# Patient Record
Sex: FEMALE | URBAN - METROPOLITAN AREA
[De-identification: names, ages, dates, MRNs, and addresses within clinical notes are randomized per-mention and may not be internally consistent; named-entity substitution may affect disease eponyms.]

---

## 2019-01-02 ENCOUNTER — OFFICE (OUTPATIENT)
Dept: URBAN - METROPOLITAN AREA CLINIC 66 | Facility: CLINIC | Age: 64
End: 2019-01-02

## 2019-01-02 VITALS
HEIGHT: 68 IN | WEIGHT: 172 LBS | DIASTOLIC BLOOD PRESSURE: 74 MMHG | HEART RATE: 72 BPM | SYSTOLIC BLOOD PRESSURE: 110 MMHG

## 2019-01-02 DIAGNOSIS — Z86.010 PERSONAL HISTORY OF COLONIC POLYPS: ICD-10-CM

## 2019-01-02 DIAGNOSIS — I81 PORTAL VEIN THROMBOSIS: ICD-10-CM

## 2019-01-02 DIAGNOSIS — I85.00 ESOPHAGEAL VARICES WITHOUT BLEEDING: ICD-10-CM

## 2019-01-02 DIAGNOSIS — R93.3 ABNORMAL FINDINGS ON DIAGNOSTIC IMAGING OF OTHER PARTS OF DI: ICD-10-CM

## 2019-01-02 PROCEDURE — 99243 OFF/OP CNSLTJ NEW/EST LOW 30: CPT | Performed by: INTERNAL MEDICINE

## 2019-06-25 ENCOUNTER — OFFICE (OUTPATIENT)
Dept: URBAN - METROPOLITAN AREA CLINIC 66 | Facility: CLINIC | Age: 64
End: 2019-06-25

## 2019-06-25 VITALS
HEART RATE: 74 BPM | HEIGHT: 68 IN | SYSTOLIC BLOOD PRESSURE: 118 MMHG | DIASTOLIC BLOOD PRESSURE: 72 MMHG | WEIGHT: 172 LBS

## 2019-06-25 DIAGNOSIS — I81 PORTAL VEIN THROMBOSIS: ICD-10-CM

## 2019-06-25 DIAGNOSIS — I85.00 ESOPHAGEAL VARICES WITHOUT BLEEDING: ICD-10-CM

## 2019-06-25 DIAGNOSIS — R93.3 ABNORMAL FINDINGS ON DIAGNOSTIC IMAGING OF OTHER PARTS OF DI: ICD-10-CM

## 2019-06-25 PROCEDURE — 99213 OFFICE O/P EST LOW 20 MIN: CPT | Performed by: INTERNAL MEDICINE

## 2019-11-25 ENCOUNTER — AMBULATORY SURGICAL CENTER (OUTPATIENT)
Dept: URBAN - METROPOLITAN AREA SURGERY 20 | Facility: SURGERY | Age: 64
End: 2019-11-25

## 2019-11-25 ENCOUNTER — OFFICE (OUTPATIENT)
Dept: URBAN - METROPOLITAN AREA PATHOLOGY 4 | Facility: PATHOLOGY | Age: 64
End: 2019-11-25

## 2019-11-25 VITALS — HEIGHT: 68 IN

## 2019-11-25 DIAGNOSIS — K29.70 GASTRITIS, UNSPECIFIED, WITHOUT BLEEDING: ICD-10-CM

## 2019-11-25 DIAGNOSIS — Z86.010 PERSONAL HISTORY OF COLONIC POLYPS: ICD-10-CM

## 2019-11-25 DIAGNOSIS — K31.89 OTHER DISEASES OF STOMACH AND DUODENUM: ICD-10-CM

## 2019-11-25 DIAGNOSIS — I85.00 ESOPHAGEAL VARICES WITHOUT BLEEDING: ICD-10-CM

## 2019-11-25 LAB
GI HISTOLOGY: A. SELECT: (no result)
GI HISTOLOGY: PDF REPORT: (no result)

## 2019-11-25 PROCEDURE — 43239 EGD BIOPSY SINGLE/MULTIPLE: CPT | Performed by: INTERNAL MEDICINE

## 2019-11-25 PROCEDURE — 88305 TISSUE EXAM BY PATHOLOGIST: CPT | Performed by: INTERNAL MEDICINE

## 2019-11-25 PROCEDURE — 45378 DIAGNOSTIC COLONOSCOPY: CPT | Mod: 33 | Performed by: INTERNAL MEDICINE

## 2020-02-11 ENCOUNTER — OFFICE (OUTPATIENT)
Dept: URBAN - METROPOLITAN AREA CLINIC 66 | Facility: CLINIC | Age: 65
End: 2020-02-11

## 2020-02-11 VITALS
HEIGHT: 68 IN | WEIGHT: 170 LBS | DIASTOLIC BLOOD PRESSURE: 68 MMHG | HEART RATE: 60 BPM | SYSTOLIC BLOOD PRESSURE: 110 MMHG

## 2020-02-11 DIAGNOSIS — I81 PORTAL VEIN THROMBOSIS: ICD-10-CM

## 2020-02-11 DIAGNOSIS — Z86.010 PERSONAL HISTORY OF COLONIC POLYPS: ICD-10-CM

## 2020-02-11 DIAGNOSIS — I85.00 ESOPHAGEAL VARICES WITHOUT BLEEDING: ICD-10-CM

## 2020-02-11 DIAGNOSIS — Z90.49 ACQUIRED ABSENCE OF OTHER SPECIFIED PARTS OF DIGESTIVE TRACT: ICD-10-CM

## 2020-02-11 PROCEDURE — 99214 OFFICE O/P EST MOD 30 MIN: CPT | Performed by: INTERNAL MEDICINE

## 2020-09-30 ENCOUNTER — TRANSCRIBE ORDERS (OUTPATIENT)
Dept: PREADMISSION TESTING | Facility: HOSPITAL | Age: 65
End: 2020-09-30

## 2020-09-30 DIAGNOSIS — Z01.818 OTHER SPECIFIED PRE-OPERATIVE EXAMINATION: Primary | ICD-10-CM

## 2020-10-05 ENCOUNTER — APPOINTMENT (OUTPATIENT)
Dept: PREADMISSION TESTING | Facility: HOSPITAL | Age: 65
End: 2020-10-05

## 2020-10-05 VITALS
OXYGEN SATURATION: 98 % | BODY MASS INDEX: 26.07 KG/M2 | HEART RATE: 62 BPM | HEIGHT: 68 IN | DIASTOLIC BLOOD PRESSURE: 69 MMHG | RESPIRATION RATE: 16 BRPM | SYSTOLIC BLOOD PRESSURE: 118 MMHG | WEIGHT: 172 LBS | TEMPERATURE: 97.2 F

## 2020-10-05 LAB
ANION GAP SERPL CALCULATED.3IONS-SCNC: 9.6 MMOL/L (ref 5–15)
BUN SERPL-MCNC: 18 MG/DL (ref 8–23)
BUN/CREAT SERPL: 20.5 (ref 7–25)
CALCIUM SPEC-SCNC: 9.2 MG/DL (ref 8.6–10.5)
CHLORIDE SERPL-SCNC: 108 MMOL/L (ref 98–107)
CO2 SERPL-SCNC: 23.4 MMOL/L (ref 22–29)
CREAT SERPL-MCNC: 0.88 MG/DL (ref 0.57–1)
DEPRECATED RDW RBC AUTO: 46.7 FL (ref 37–54)
ERYTHROCYTE [DISTWIDTH] IN BLOOD BY AUTOMATED COUNT: 14.8 % (ref 12.3–15.4)
GFR SERPL CREATININE-BSD FRML MDRD: 65 ML/MIN/1.73
GLUCOSE SERPL-MCNC: 99 MG/DL (ref 65–99)
HCT VFR BLD AUTO: 40.5 % (ref 34–46.6)
HGB BLD-MCNC: 13.5 G/DL (ref 12–15.9)
MCH RBC QN AUTO: 29.3 PG (ref 26.6–33)
MCHC RBC AUTO-ENTMCNC: 33.3 G/DL (ref 31.5–35.7)
MCV RBC AUTO: 88 FL (ref 79–97)
PLATELET # BLD AUTO: 378 10*3/MM3 (ref 140–450)
PMV BLD AUTO: 10.5 FL (ref 6–12)
POTASSIUM SERPL-SCNC: 4.7 MMOL/L (ref 3.5–5.2)
RBC # BLD AUTO: 4.6 10*6/MM3 (ref 3.77–5.28)
SODIUM SERPL-SCNC: 141 MMOL/L (ref 136–145)
WBC # BLD AUTO: 7.47 10*3/MM3 (ref 3.4–10.8)

## 2020-10-05 PROCEDURE — 85027 COMPLETE CBC AUTOMATED: CPT | Performed by: UROLOGY

## 2020-10-05 PROCEDURE — 93005 ELECTROCARDIOGRAM TRACING: CPT

## 2020-10-05 PROCEDURE — 93010 ELECTROCARDIOGRAM REPORT: CPT | Performed by: INTERNAL MEDICINE

## 2020-10-05 PROCEDURE — 80048 BASIC METABOLIC PNL TOTAL CA: CPT | Performed by: UROLOGY

## 2020-10-05 PROCEDURE — 36415 COLL VENOUS BLD VENIPUNCTURE: CPT

## 2020-10-05 RX ORDER — WARFARIN SODIUM 4 MG/1
4 TABLET ORAL
COMMUNITY
End: 2023-01-23

## 2020-10-05 RX ORDER — CETIRIZINE HYDROCHLORIDE 10 MG/1
10 TABLET ORAL NIGHTLY PRN
COMMUNITY
End: 2023-01-23

## 2020-10-05 RX ORDER — ACETAMINOPHEN 500 MG
500 TABLET ORAL EVERY 6 HOURS PRN
COMMUNITY
End: 2023-01-23

## 2020-10-05 RX ORDER — NADOLOL 20 MG/1
30 TABLET ORAL EVERY MORNING
COMMUNITY

## 2020-10-05 NOTE — DISCHARGE INSTRUCTIONS
Take the following medications the morning of surgery with a small sip of water:    NADOLOL    ARRIVE AT 0900.      If you are on prescription narcotic pain medication to control your pain you may also take that medication the morning of surgery.    General Instructions:  • Do not eat or drink anything after midnight the night before surgery.  • Infants may have breast milk up to four hours before surgery.  • Infants drinking formula may drink formula up to six hours before surgery.   • Patients who avoid smoking, chewing tobacco and alcohol for 4 weeks prior to surgery have a reduced risk of post-operative complications.  Quit smoking as many days before surgery as you can.  • Do not smoke, use chewing tobacco or drink alcohol the day of surgery.   • If applicable bring your C-PAP/ BI-PAP machine.  • Bring any papers given to you in the doctor’s office.  • Wear clean comfortable clothes.  • Do not wear contact lenses, false eyelashes or make-up.  Bring a case for your glasses.   • Bring crutches or walker if applicable.  • Remove all piercings.  Leave jewelry and any other valuables at home.  • Hair extensions with metal clips must be removed prior to surgery.  • The Pre-Admission Testing nurse will instruct you to bring medications if unable to obtain an accurate list in Pre-Admission Testing.        If you were given a blood bank ID arm band remember to bring it with you the day of surgery.    Preventing a Surgical Site Infection:  • For 2 to 3 days before surgery, avoid shaving with a razor because the razor can irritate skin and make it easier to develop an infection.    • Any areas of open skin can increase the risk of a post-operative wound infection by allowing bacteria to enter and travel throughout the body.  Notify your surgeon if you have any skin wounds / rashes even if it is not near the expected surgical site.  The area will need assessed to determine if surgery should be delayed until it is  healed.  • The night prior to surgery shower using a fresh bar of anti-bacterial soap (such as Dial) and clean washcloth.  Sleep in a clean bed with clean clothing.  Do not allow pets to sleep with you.  • Shower on the morning of surgery using a fresh bar of anti-bacterial soap (such as Dial) and clean washcloth.  Dry with a clean towel and dress in clean clothing.  • Ask your surgeon if you will be receiving antibiotics prior to surgery.  • Make sure you, your family, and all healthcare providers clean their hands with soap and water or an alcohol based hand  before caring for you or your wound.    Day of surgery:  Your arrival time is approximately two hours before your scheduled surgery time.  Upon arrival, a Pre-op nurse and Anesthesiologist will review your health history, obtain vital signs, and answer questions you may have.  The only belongings needed at this time will be your home medications and if applicable your C-PAP/BI-PAP machine.  If you are staying overnight your family can leave the rest of your belongings in the car and bring them to your room later.  A Pre-op nurse will start an IV and you may receive medication in preparation for surgery, including something to help you relax.  Your family will be able to see you in the Pre-op area.  Two visitors at a time will be allowed in the Pre-op room.  While you are in surgery your family should notify the waiting room  if they leave the waiting room area and provide a contact phone number.    Please be aware that surgery does come with discomfort.  We want to make every effort to control your discomfort so please discuss any uncontrolled symptoms with your nurse.   Your doctor will most likely have prescribed pain medications.      If you are going home after surgery you will receive individualized written care instructions before being discharged.  A responsible adult must drive you to and from the hospital on the day of your  surgery and stay with you for 24 hours.    If you are staying overnight following surgery, you will be transported to your hospital room following the recovery period.  [facility] has all private rooms.    If you have any questions please call Pre-Admission Testing at [phone].  Deductibles and co-payments are collected on the day of service. Please be prepared to pay the required co-pay, deductible or deposit on the day of service as defined by your plan.    Patient Education for Self-Quarantine Process    Following your COVID testing, we strongly recommend that you do not leave your home after you have been tested for COVID except to get medical care. This includes not going to work, school or to public areas.  If this is not possible for you to do please limit your activities to only required outings.  Be sure to wear a mask when you are with other people, practice social distancing and wash your hands frequently.      The following items provide additional details to keep you safe.  • Wash your hands with soap and water frequently for at least 20 seconds.   • Avoid touching your eyes, nose and mouth with unwashed hands.  • Do not share anything - utensils, towels, food from the same bowl.   • Have your own utensils, drinking glass, dishes, towels and bedding.   • Do not have visitors.   • Do use FaceTime to stay in touch with family and friends.  • You should stay in a specific room away from others if possible.   • Stay at least 6 feet away from others in the home if you cannot have a dedicated room to yourself.   • Do not snuggle with your pet. While the CDC says there is no evidence that pets can spread COVID-19 or be infected from humans, it is probably best to avoid “petting, snuggling, being kissed or licked and sharing food (during self-quarantine)”, according to the CDC.   • Sanitize household surfaces daily. Include all high touch areas (door handles, light switches, phones,  countertops, etc.)  • Do not share a bathroom with others, if possible.   • Wear a mask around others in your home if you are unable to stay in a separate room or 6 feet apart. If  you are unable to wear a mask, have your family member wear a mask if they must be within 6 feet of you.   Call your surgeon immediately if you experience any of the following symptoms:  • Sore Throat  • Shortness of Breath or difficulty breathing  • Cough  • Chills  • Body soreness or muscle pain  • Headache  • Fever  • New loss of taste or smell  • Do not arrive for your surgery ill.  Your procedure will need to be rescheduled to another time.  You will need to call your physician before the day of surgery to avoid any unnecessary exposure to hospital staff as well as other patients.

## 2020-10-12 ENCOUNTER — LAB (OUTPATIENT)
Dept: LAB | Facility: HOSPITAL | Age: 65
End: 2020-10-12

## 2020-10-12 DIAGNOSIS — Z01.818 OTHER SPECIFIED PRE-OPERATIVE EXAMINATION: ICD-10-CM

## 2020-10-12 PROCEDURE — C9803 HOPD COVID-19 SPEC COLLECT: HCPCS

## 2020-10-12 PROCEDURE — U0004 COV-19 TEST NON-CDC HGH THRU: HCPCS

## 2020-10-13 LAB — SARS-COV-2 RNA RESP QL NAA+PROBE: NOT DETECTED

## 2020-10-14 ENCOUNTER — ANESTHESIA (OUTPATIENT)
Dept: PERIOP | Facility: HOSPITAL | Age: 65
End: 2020-10-14

## 2020-10-14 ENCOUNTER — HOSPITAL ENCOUNTER (OUTPATIENT)
Facility: HOSPITAL | Age: 65
Setting detail: HOSPITAL OUTPATIENT SURGERY
Discharge: HOME | End: 2020-10-14
Attending: UROLOGY | Admitting: UROLOGY

## 2020-10-14 ENCOUNTER — ANESTHESIA EVENT (OUTPATIENT)
Dept: PERIOP | Facility: HOSPITAL | Age: 65
End: 2020-10-14

## 2020-10-14 ENCOUNTER — APPOINTMENT (OUTPATIENT)
Dept: GENERAL RADIOLOGY | Facility: HOSPITAL | Age: 65
End: 2020-10-14

## 2020-10-14 VITALS
HEIGHT: 68 IN | TEMPERATURE: 97.6 F | BODY MASS INDEX: 25.66 KG/M2 | DIASTOLIC BLOOD PRESSURE: 67 MMHG | RESPIRATION RATE: 16 BRPM | OXYGEN SATURATION: 98 % | HEART RATE: 59 BPM | WEIGHT: 169.3 LBS | SYSTOLIC BLOOD PRESSURE: 112 MMHG

## 2020-10-14 DIAGNOSIS — Q62.2: Primary | ICD-10-CM

## 2020-10-14 LAB
INR PPP: 1.44 (ref 0.9–1.1)
PROTHROMBIN TIME: 17.3 SECONDS (ref 11.7–14.2)

## 2020-10-14 PROCEDURE — 74420 UROGRAPHY RTRGR +-KUB: CPT

## 2020-10-14 PROCEDURE — C1758 CATHETER, URETERAL: HCPCS | Performed by: UROLOGY

## 2020-10-14 PROCEDURE — 85610 PROTHROMBIN TIME: CPT | Performed by: UROLOGY

## 2020-10-14 PROCEDURE — 25010000003 CEFAZOLIN IN DEXTROSE 2-4 GM/100ML-% SOLUTION: Performed by: UROLOGY

## 2020-10-14 PROCEDURE — 0 IOTHALAMATE 60 % SOLUTION: Performed by: UROLOGY

## 2020-10-14 PROCEDURE — 25010000002 PROPOFOL 10 MG/ML EMULSION: Performed by: NURSE ANESTHETIST, CERTIFIED REGISTERED

## 2020-10-14 RX ORDER — PROMETHAZINE HYDROCHLORIDE 25 MG/1
25 TABLET ORAL ONCE AS NEEDED
Status: DISCONTINUED | OUTPATIENT
Start: 2020-10-14 | End: 2020-10-14 | Stop reason: HOSPADM

## 2020-10-14 RX ORDER — FOLIC ACID 0.8 MG
1 TABLET ORAL DAILY
COMMUNITY
End: 2021-01-13

## 2020-10-14 RX ORDER — HYDRALAZINE HYDROCHLORIDE 20 MG/ML
5 INJECTION INTRAMUSCULAR; INTRAVENOUS
Status: DISCONTINUED | OUTPATIENT
Start: 2020-10-14 | End: 2020-10-14 | Stop reason: HOSPADM

## 2020-10-14 RX ORDER — ATROPA BELLADONNA AND OPIUM 16.2; 3 MG/1; MG/1
30 SUPPOSITORY RECTAL ONCE
Status: DISCONTINUED | OUTPATIENT
Start: 2020-10-14 | End: 2020-10-14 | Stop reason: HOSPADM

## 2020-10-14 RX ORDER — NALOXONE HCL 0.4 MG/ML
0.2 VIAL (ML) INJECTION AS NEEDED
Status: DISCONTINUED | OUTPATIENT
Start: 2020-10-14 | End: 2020-10-14 | Stop reason: HOSPADM

## 2020-10-14 RX ORDER — OXYCODONE AND ACETAMINOPHEN 7.5; 325 MG/1; MG/1
1 TABLET ORAL ONCE AS NEEDED
Status: DISCONTINUED | OUTPATIENT
Start: 2020-10-14 | End: 2020-10-14 | Stop reason: HOSPADM

## 2020-10-14 RX ORDER — DIPHENHYDRAMINE HYDROCHLORIDE 50 MG/ML
12.5 INJECTION INTRAMUSCULAR; INTRAVENOUS
Status: DISCONTINUED | OUTPATIENT
Start: 2020-10-14 | End: 2020-10-14 | Stop reason: HOSPADM

## 2020-10-14 RX ORDER — SODIUM CHLORIDE 0.9 % (FLUSH) 0.9 %
3-10 SYRINGE (ML) INJECTION AS NEEDED
Status: DISCONTINUED | OUTPATIENT
Start: 2020-10-14 | End: 2020-10-14 | Stop reason: HOSPADM

## 2020-10-14 RX ORDER — HYDROMORPHONE HYDROCHLORIDE 1 MG/ML
0.5 INJECTION, SOLUTION INTRAMUSCULAR; INTRAVENOUS; SUBCUTANEOUS
Status: DISCONTINUED | OUTPATIENT
Start: 2020-10-14 | End: 2020-10-14 | Stop reason: HOSPADM

## 2020-10-14 RX ORDER — FLUMAZENIL 0.1 MG/ML
0.2 INJECTION INTRAVENOUS AS NEEDED
Status: DISCONTINUED | OUTPATIENT
Start: 2020-10-14 | End: 2020-10-14 | Stop reason: HOSPADM

## 2020-10-14 RX ORDER — GLYCOPYRROLATE 0.2 MG/ML
INJECTION INTRAMUSCULAR; INTRAVENOUS AS NEEDED
Status: DISCONTINUED | OUTPATIENT
Start: 2020-10-14 | End: 2020-10-14 | Stop reason: SURG

## 2020-10-14 RX ORDER — MAGNESIUM HYDROXIDE 1200 MG/15ML
LIQUID ORAL AS NEEDED
Status: DISCONTINUED | OUTPATIENT
Start: 2020-10-14 | End: 2020-10-14 | Stop reason: HOSPADM

## 2020-10-14 RX ORDER — ONDANSETRON 2 MG/ML
4 INJECTION INTRAMUSCULAR; INTRAVENOUS ONCE AS NEEDED
Status: DISCONTINUED | OUTPATIENT
Start: 2020-10-14 | End: 2020-10-14 | Stop reason: HOSPADM

## 2020-10-14 RX ORDER — SODIUM CHLORIDE 0.9 % (FLUSH) 0.9 %
3 SYRINGE (ML) INJECTION EVERY 12 HOURS SCHEDULED
Status: DISCONTINUED | OUTPATIENT
Start: 2020-10-14 | End: 2020-10-14 | Stop reason: HOSPADM

## 2020-10-14 RX ORDER — HYDROCODONE BITARTRATE AND ACETAMINOPHEN 7.5; 325 MG/1; MG/1
1 TABLET ORAL ONCE AS NEEDED
Status: COMPLETED | OUTPATIENT
Start: 2020-10-14 | End: 2020-10-14

## 2020-10-14 RX ORDER — SODIUM CHLORIDE, SODIUM LACTATE, POTASSIUM CHLORIDE, CALCIUM CHLORIDE 600; 310; 30; 20 MG/100ML; MG/100ML; MG/100ML; MG/100ML
9 INJECTION, SOLUTION INTRAVENOUS CONTINUOUS
Status: DISCONTINUED | OUTPATIENT
Start: 2020-10-14 | End: 2020-10-14 | Stop reason: HOSPADM

## 2020-10-14 RX ORDER — CEPHALEXIN 500 MG/1
500 CAPSULE ORAL 3 TIMES DAILY
Qty: 21 CAPSULE | Refills: 0 | Status: SHIPPED | OUTPATIENT
Start: 2020-10-14 | End: 2020-10-21

## 2020-10-14 RX ORDER — MIDAZOLAM HYDROCHLORIDE 1 MG/ML
1 INJECTION INTRAMUSCULAR; INTRAVENOUS
Status: DISCONTINUED | OUTPATIENT
Start: 2020-10-14 | End: 2020-10-14 | Stop reason: HOSPADM

## 2020-10-14 RX ORDER — EPHEDRINE SULFATE 50 MG/ML
INJECTION, SOLUTION INTRAVENOUS AS NEEDED
Status: DISCONTINUED | OUTPATIENT
Start: 2020-10-14 | End: 2020-10-14 | Stop reason: SURG

## 2020-10-14 RX ORDER — LABETALOL HYDROCHLORIDE 5 MG/ML
5 INJECTION, SOLUTION INTRAVENOUS
Status: DISCONTINUED | OUTPATIENT
Start: 2020-10-14 | End: 2020-10-14 | Stop reason: HOSPADM

## 2020-10-14 RX ORDER — CEFAZOLIN SODIUM 2 G/100ML
2 INJECTION, SOLUTION INTRAVENOUS ONCE
Status: COMPLETED | OUTPATIENT
Start: 2020-10-14 | End: 2020-10-14

## 2020-10-14 RX ORDER — FENTANYL CITRATE 50 UG/ML
50 INJECTION, SOLUTION INTRAMUSCULAR; INTRAVENOUS
Status: DISCONTINUED | OUTPATIENT
Start: 2020-10-14 | End: 2020-10-14 | Stop reason: HOSPADM

## 2020-10-14 RX ORDER — PROMETHAZINE HYDROCHLORIDE 25 MG/1
25 SUPPOSITORY RECTAL ONCE AS NEEDED
Status: DISCONTINUED | OUTPATIENT
Start: 2020-10-14 | End: 2020-10-14 | Stop reason: HOSPADM

## 2020-10-14 RX ORDER — PROPOFOL 10 MG/ML
VIAL (ML) INTRAVENOUS AS NEEDED
Status: DISCONTINUED | OUTPATIENT
Start: 2020-10-14 | End: 2020-10-14 | Stop reason: SURG

## 2020-10-14 RX ORDER — DIPHENHYDRAMINE HCL 25 MG
25 CAPSULE ORAL
Status: DISCONTINUED | OUTPATIENT
Start: 2020-10-14 | End: 2020-10-14 | Stop reason: HOSPADM

## 2020-10-14 RX ORDER — LIDOCAINE HYDROCHLORIDE 20 MG/ML
INJECTION, SOLUTION INFILTRATION; PERINEURAL AS NEEDED
Status: DISCONTINUED | OUTPATIENT
Start: 2020-10-14 | End: 2020-10-14 | Stop reason: SURG

## 2020-10-14 RX ORDER — LIDOCAINE HYDROCHLORIDE 10 MG/ML
0.5 INJECTION, SOLUTION EPIDURAL; INFILTRATION; INTRACAUDAL; PERINEURAL ONCE AS NEEDED
Status: DISCONTINUED | OUTPATIENT
Start: 2020-10-14 | End: 2020-10-14 | Stop reason: HOSPADM

## 2020-10-14 RX ORDER — EPHEDRINE SULFATE 50 MG/ML
5 INJECTION, SOLUTION INTRAVENOUS ONCE AS NEEDED
Status: DISCONTINUED | OUTPATIENT
Start: 2020-10-14 | End: 2020-10-14 | Stop reason: HOSPADM

## 2020-10-14 RX ADMIN — SODIUM CHLORIDE, POTASSIUM CHLORIDE, SODIUM LACTATE AND CALCIUM CHLORIDE 9 ML/HR: 600; 310; 30; 20 INJECTION, SOLUTION INTRAVENOUS at 09:41

## 2020-10-14 RX ADMIN — EPHEDRINE SULFATE 10 MG: 50 INJECTION INTRAVENOUS at 11:41

## 2020-10-14 RX ADMIN — GLYCOPYRROLATE 0.2 MG: 0.2 INJECTION INTRAMUSCULAR; INTRAVENOUS at 11:36

## 2020-10-14 RX ADMIN — PROPOFOL 150 MG: 10 INJECTION, EMULSION INTRAVENOUS at 11:22

## 2020-10-14 RX ADMIN — HYDROCODONE BITARTRATE AND ACETAMINOPHEN 1 TABLET: 7.5; 325 TABLET ORAL at 12:14

## 2020-10-14 RX ADMIN — CEFAZOLIN SODIUM 2 G: 2 INJECTION, SOLUTION INTRAVENOUS at 11:25

## 2020-10-14 RX ADMIN — LIDOCAINE HYDROCHLORIDE 60 MG: 20 INJECTION, SOLUTION INFILTRATION; PERINEURAL at 11:22

## 2020-10-14 NOTE — OP NOTE
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  Left retrograde pyelogram shows a tortuous left ureter dilated to the UVJ with blunting of the calyces but excellent drainage and no evidence of any obstruction consistent with an nonobstructive megaureter.    Complications: None apparent    Indications: This is a 65-year-old female with left hydronephrosis who now presents for cystoscopy and retrogrades.  She had a retrograde pyelogram ureteroscopy and the left stent placed in 2015 for similar issues but she still shows persistent hydronephrosis.  Her renal function remains excellent.    Procedure: Patient was taken to the op suite given general anesthesia.  Placed in lithotomy.  Prepped and draped in a sterile fashion.  Timeout was performed.  Cystoscope was inserted.  The bladder was unremarkable.  Minimal trabeculation was noted.  The bladder looked healthy.  A right retrograde pyelogram was formed and open-ended ureteral catheter.  This showed a normal ureter normal pelvis normal calyces sharp and well delineated with no filling defect.  The left retrograde pyelogram showed narrowing of the orifice but it was not stenotic.  She had a very dilated tortuous ureter all the way up to the renal pelvis which was dilated  with blunting of the calyces.  There is no clear filling defects.  There is no evidence of any stone disease.  I watched this over several minutes and observe that it was draining very well no evidence to suggest obstruction.  Findings were consistent with a nonobstructing megaureter.  No stent was placed as she is asymptomatic.  He was awoken and taken to recovery in stable condition.      Jaiden Desir MD     Date: 10/14/2020  Time: 11:58 EDT

## 2020-10-14 NOTE — ANESTHESIA POSTPROCEDURE EVALUATION
Patient: Anne Marie Mueller    Procedure Summary     Date: 10/14/20 Room / Location: [facility] OR  / [facility] MAIN OR    Anesthesia Start: 1117 Anesthesia Stop: 1153    Procedure: CYSTOSCOPY RETROGRADE PYELOGRAM (Bilateral Urethra) Diagnosis:       Non-obstructive megaureter      (Left hydronephrosis)    Surgeon: Jaiden Desir MD Provider: Cory York MD    Anesthesia Type: general ASA Status: 2          Anesthesia Type: general    Vitals  Vitals Value Taken Time   /66 10/14/20 1230   Temp 36.4 °C (97.6 °F) 10/14/20 1149   Pulse 62 10/14/20 1232   Resp 16 10/14/20 1215   SpO2 97 % 10/14/20 1232   Vitals shown include unvalidated device data.        Post Anesthesia Care and Evaluation    Patient location during evaluation: PACU  Patient participation: complete - patient participated  Level of consciousness: awake and alert  Pain management: adequate  Airway patency: patent  Anesthetic complications: No anesthetic complications    Cardiovascular status: acceptable  Respiratory status: acceptable  Hydration status: acceptable    Comments: --------------------            10/14/20               1236     --------------------   BP:       111/77     Pulse:      63       Resp:       16       Temp:                SpO2:      98%      --------------------

## 2020-10-14 NOTE — PERIOPERATIVE NURSING NOTE
Spoke to Dr Desir per pt request of when to restart coumadin, ok for pt to start tomorrow, info relayed to pt, verbalized understanding

## 2020-10-14 NOTE — ANESTHESIA PROCEDURE NOTES
Airway  Urgency: elective    Date/Time: 10/14/2020 11:24 AM  Airway not difficult    General Information and Staff    Patient location during procedure: OR  Anesthesiologist: Cory York MD  CRNA: Audra Allen CRNA    Indications and Patient Condition  Indications for airway management: airway protection    Preoxygenated: yes  Mask difficulty assessment: 0 - not attempted    Final Airway Details  Final airway type: supraglottic airway      Successful airway: LMA  Size 4    Number of attempts at approach: 1  Assessment: lips, teeth, and gum same as pre-op and atraumatic intubation    Additional Comments  Atraumatic LMA placement, LMA to MOP, good seal and air exchange.

## 2020-10-14 NOTE — ANESTHESIA PREPROCEDURE EVALUATION
Anesthesia Evaluation     Patient summary reviewed and Nursing notes reviewed   no history of anesthetic complications:  NPO Solid Status: > 8 hours  NPO Liquid Status: > 2 hours           Airway   Mallampati: II  TM distance: >3 FB  Neck ROM: full  no difficulty expected  Dental - normal exam     Pulmonary - negative pulmonary ROS and normal exam   (-) COPD, asthma, not a smoker, lung cancer  Cardiovascular - normal exam  Exercise tolerance: good (4-7 METS)    ECG reviewed  Rhythm: regular  Rate: normal    (+) hypertension well controlled, DVT current,   (-) valvular problems/murmurs, past MI, CAD, dysrhythmias, angina, cardiac stents, CABG    ROS comment: 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  Remains on coumadin daily, last dose Fri night/Sat morning.    Neuro/Psych- negative ROS  (-) seizures, TIA, CVA  GI/Hepatic/Renal/Endo    (+)   liver disease,   (-) GERD, PUD, hepatitis, no renal disease, diabetes, GI bleed, no thyroid disorder    Musculoskeletal     Abdominal  - normal exam   Substance History - negative use     OB/GYN negative ob/gyn ROS         Other   arthritis,                      Anesthesia Plan    ASA 2     general     intravenous induction     Anesthetic plan, all risks, benefits, and alternatives have been provided, discussed and informed consent has been obtained with: patient.    Plan discussed with CRNA and attending.

## 2020-10-14 NOTE — H&P
[facility] Surgical History and Physical    Patient Care Team:  Anne Marie Mueller MD as PCP - General (Family Medicine)  Justo Erwin MD as Consulting Physician (Gastroenterology)  Jaiden Desir MD as Consulting Physician (Urology)    Chief complaint hydronephrosis    Subjective     Patient is a 65 y.o. female presents with persistent left hydronephrosis.  I suspect she has a nonobstructing megaureter.  Her hydronephrosis continues to be persistent despite her bladder management.  She is had episodic UTIs.  She will undergo cystoscopy and retrogrades just to assure that her left unit is draining adequately and she has no obstructive lesion..     Review of Systems   Pertinent items are noted in HPI    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   • Portal hypertension (CMS/HCC)    • Splenomegaly      Past Surgical History:   Procedure Laterality Date   • APPENDECTOMY     • COLONOSCOPY     • ENDOSCOPY     • EXPLORATORY LAPAROTOMY     • LAPAROSCOPIC CHOLECYSTECTOMY     • TONSILLECTOMY     • URETERAL STENT INSERTION       Family History   Problem Relation Age of Onset   • Malig Hyperthermia Neg Hx      Social History     Tobacco Use   • Smoking status: Never Smoker   • Smokeless tobacco: Never Used   Substance Use Topics   • Alcohol use: Not on file     Comment: RARELY   • Drug use: Never       Meds:  Medications Prior to Admission   Medication Sig Dispense Refill Last Dose   • Magnesium 500 MG capsule Take 1 capsule by mouth Daily.   10/7/2020   • nadolol (CORGARD) 20 MG tablet Take 30 mg by mouth Daily.   10/14/2020 at 0700   • acetaminophen (TYLENOL) 500 MG tablet Take 500 mg by mouth Every 6 (Six) Hours As Needed for Mild Pain .   10/11/2020   • calcium citrate-vitamin d (CITRACAL) 200-250 MG-UNIT  "tablet tablet Take 1 tablet by mouth Daily.   10/13/2020 at 0900   • cetirizine (zyrTEC) 10 MG tablet Take 10 mg by mouth At Night As Needed for Allergies.   10/10/2020   • fluticasone (VERAMYST) 27.5 MCG/SPRAY nasal spray 2 sprays into the nostril(s) as directed by provider Daily As Needed.   More than a month at Unknown time   • Magnesium Hydroxide (MAGNESIA PO) Take 500 mg by mouth Daily.      • magnesium hydroxide (MILK OF MAGNESIA) 2400 MG/10ML suspension suspension Take 10 mL by mouth Daily As Needed.      • warfarin (COUMADIN) 3 MG tablet Take 9 mg by mouth Daily. HOLD PER MD LANGLEY   10/9/2020       Allergies:  Aspartame, Dexamethasone, Dye fdc red [red dye], Morphine, and Prednisone    Debilities:  None    Objective     Vital Signs  Temp:  [97.7 °F (36.5 °C)] 97.7 °F (36.5 °C)  Heart Rate:  [63] 63  Resp:  [18] 18  BP: (116)/(75) 116/75  No intake or output data in the 24 hours ending 10/14/20 1108  Flowsheet Rows      First Filed Value   Admission Height  172.7 cm (68\") Documented at 10/14/2020 0919   Admission Weight  76.8 kg (169 lb 4.8 oz) Documented at 10/14/2020 0919           Physical Exam:     General Appearance:    Alert, cooperative, NAD   HEENT:    No trauma, pupils reactive, hearing intact   Back:     No CVA tenderness   Lungs:     Respirations unlabored, no wheezing    Heart:    RRR, intact peripheral pulses   Abdomen:     Soft, NDNT, no masses, no guarding   :   Deferred   Extremities:   No edema, no deformity   Lymphatic:   No neck or groin LAD   Skin:   No bleeding, bruising or rashes   Neuro/Psych:   Orientation intact, mood/affect pleasant, no focal findings     Results Review:    I reviewed the patient's new clinical results.  Results for orders placed or performed during the hospital encounter of 10/14/20   Protime-INR    Specimen: Blood   Result Value Ref Range    Protime 17.3 (H) 11.7 - 14.2 Seconds    INR 1.44 (H) 0.90 - 1.10        Assessment:  Left hydronephrosis    Plan:    No " left retrograde pyelogram possible stent placement    I discussed the patients findings and my recommendations with patient.   Risks, complications, outcomes and alternatives discussed with the patient at the bedside and office.    Jaiden Desir MD  10/14/20  11:08 EDT

## 2020-11-18 NOTE — PROGRESS NOTES
"SURGERY  Anne Marie Mueller   1955  11/23/2020    Chief Complaint:  left inguinal hernia    HPI    Ms. Mueller is a nice 65 y.o. female who presents with a left inguinal hernia.  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  She had exploratory surgery for that, when they thought in fact that she had ovarian cancer, but discovered it was not.  She is also been hospitalized with a small bowel obstruction without surgery, spontaneously resolving.  She is cared for by Dr. Mode Erwin as far as her GI issues.    He was placed on Coumadin at the time of her diagnosis and has stayed on it since then, but says she is very frequently off of that for interventions without any problems and without the need for Lovenox bridge.  She has requested that that not be used.    She also has a very complicated ureteral history with dilatation and is cared for by Dr. Jerman Desir.    Now she presents with pain in the left groin, significant enough that she \"saw stars\" when her cat jumped on her.  She has pain however of a constant nature even when she is leaning against the counter.  She has occasional nausea associated with this.  There is even a sense of pressure even when she is just laying in bed.  She does feel that there is tissue that can go in and out.    Despite all of this, based on her complex history, she is very concerned about proceeding with surgery.  To that end, I have called her primary care doctor, who curiously is named Anne Marie Mueller (no relation) to discuss.    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   • Portal hypertension " (CMS/HCC)    • Splenomegaly      Past Surgical History:   Procedure Laterality Date   • APPENDECTOMY N/A 1985   • COLONOSCOPY N/A 2019   • CYSTOSCOPY Bilateral 10/14/2020    Procedure: CYSTOSCOPY RETROGRADE PYELOGRAM;  Surgeon: Jaiden Desir MD;  Location: [facility] OR;  Service: Urology;  Laterality: Bilateral;   • ENDOSCOPY N/A 2019   • EXPLORATORY LAPAROTOMY N/A 2001   • LAPAROSCOPIC CHOLECYSTECTOMY N/A 2001   • TONSILLECTOMY     • URETERAL STENT INSERTION       Family History   Problem Relation Age of Onset   • Breast cancer Sister    • Cancer Sister         Palate   • Malig Hyperthermia Neg Hx      Social History     Socioeconomic History   • Marital status:      Spouse name: Not on file   • Number of children: 3   • Years of education: Not on file   • Highest education level: Not on file   Tobacco Use   • Smoking status: Never Smoker   • Smokeless tobacco: Never Used   Substance and Sexual Activity   • Drug use: Never   • Sexual activity: Defer         Current Outpatient Medications:   •  acetaminophen (TYLENOL) 500 MG tablet, Take 500 mg by mouth Every 6 (Six) Hours As Needed for Mild Pain ., Disp: , Rfl:   •  Apple Tavo Vn-Grn Tea-Bit Or-Cr (Apple Cider Vinegar Plus) tablet, Take 1,200 mg by mouth 2 (two) times a day., Disp: , Rfl:   •  calcium citrate-vitamin d (CITRACAL) 200-250 MG-UNIT tablet tablet, Take 1 tablet by mouth Daily., Disp: , Rfl:   •  cetirizine (zyrTEC) 10 MG tablet, Take 10 mg by mouth At Night As Needed for Allergies., Disp: , Rfl:   •  Magnesium 500 MG capsule, Take 1 capsule by mouth Daily., Disp: , Rfl:   •  nadolol (CORGARD) 20 MG tablet, Take 30 mg by mouth Daily., Disp: , Rfl:   •  warfarin (COUMADIN) 4 MG tablet, Take 4 mg by mouth Daily., Disp: , Rfl:   •  warfarin (COUMADIN) 5 MG tablet, Take 5 mg by mouth Daily., Disp: , Rfl:   •  fluticasone (VERAMYST) 27.5 MCG/SPRAY nasal spray, 2 sprays into the nostril(s) as directed by provider Daily As Needed., Disp: , Rfl:  "    Allergies   Allergen Reactions   • Diclofenac Other (See Comments)     Bad heartburn causing pressure in the chest   • Aspartame Itching   • Dye Fdc Red [Red Dye] Swelling   • Morphine Headache   • Dexamethasone GI Intolerance   • Prednisone Rash and GI Intolerance     Review of Systems  Positive for abdominal distention and pain, diarrhea and nausea, difficulty urinating with urine decreased, joint pain back pain gait problem and joint swelling neck stiffness environmental and food allergies.  All other systems reviewed and negative.    Vitals:    11/23/20 1315   Weight: 77.7 kg (171 lb 3.2 oz)   Height: 172.7 cm (68\")       PHYSICAL EXAM:    Ht 172.7 cm (68\")   Wt 77.7 kg (171 lb 3.2 oz)   BMI 26.03 kg/m²   Body mass index is 26.03 kg/m².    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 Erwin is " reviewed and does not feel its significantly changed), severe left hydronephrosis without focal narrowing, spleen significantly enlarged at 16.2 cm, perihepatic and perisplenic fluid seen small amount, small left inguinal hernia containing ascitic fluid at the mesentery is diffusely attenuated nonspecific.    Pamphlet reviewed: Inguinal hernia    IMPRESSION:  · Left inguinal hernia, symptomatic, and I think of the appropriate size for fairly significant risk for incarceration.  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  I think this puts her in a situation where a laparoscopic approach would not be suitable  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    PLAN:  · I have recommended an open left inguinal hernia repair, possibly with mesh.  I like to stay out of the peritoneum as I really want to avoid an ascitic leak.  I also think trying to do so laparoscopically puts her at undue risk for rebleed, either from a recanalized falciform ligament/umbilical vein or from an extraperitoneal bleed if done extraperitoneally.  · Could be done as outpatient  · Hold Coumadin for 4 days preop  · Call put in to Dr. Deng for to discuss, the patient indicating that she has a sister who is a dermatologist that we will want to discuss the case with Dr. Mueller.    Yoanna Jacobo MD  11/23/2020    ADDEND  Patient has requested a CT prior to surgery.  She had one last summer at an outside facility so i don't think it is critical, and that her hernia may not show up on the CT since she will be recumbent, but she does have a truly complex history, so i think it is reasonable.  Order placed for [facility].  This will be dramatically more helpful than getting it at an outside facility..  Yoanna Jacobo MD  01/13/21     ADDEND  CT shows a ventral hernia just to the left of the midline at the pubis.  Will proceed as planned.  Yoanna Jacobo MD  01/19/21

## 2020-11-23 ENCOUNTER — PREP FOR SURGERY (OUTPATIENT)
Dept: OTHER | Facility: HOSPITAL | Age: 65
End: 2020-11-23

## 2020-11-23 ENCOUNTER — OFFICE VISIT (OUTPATIENT)
Dept: SURGERY | Facility: CLINIC | Age: 65
End: 2020-11-23

## 2020-11-23 VITALS — HEIGHT: 68 IN | BODY MASS INDEX: 25.94 KG/M2 | WEIGHT: 171.2 LBS

## 2020-11-23 DIAGNOSIS — K76.6 PORTAL HYPERTENSION WITH ESOPHAGEAL VARICES (HCC): ICD-10-CM

## 2020-11-23 DIAGNOSIS — K40.90 LEFT INGUINAL HERNIA: Primary | ICD-10-CM

## 2020-11-23 DIAGNOSIS — K40.90 NON-RECURRENT UNILATERAL INGUINAL HERNIA WITHOUT OBSTRUCTION OR GANGRENE: Primary | ICD-10-CM

## 2020-11-23 DIAGNOSIS — K55.069 MESENTERIC VEIN THROMBOSIS (HCC): ICD-10-CM

## 2020-11-23 DIAGNOSIS — I85.00 PORTAL HYPERTENSION WITH ESOPHAGEAL VARICES (HCC): ICD-10-CM

## 2020-11-23 PROCEDURE — 99244 OFF/OP CNSLTJ NEW/EST MOD 40: CPT | Performed by: SURGERY

## 2020-11-23 RX ORDER — WARFARIN SODIUM 5 MG/1
5 TABLET ORAL NIGHTLY
COMMUNITY
End: 2023-01-23

## 2020-11-23 RX ORDER — SODIUM CHLORIDE 0.9 % (FLUSH) 0.9 %
3 SYRINGE (ML) INJECTION EVERY 12 HOURS SCHEDULED
Status: CANCELLED | OUTPATIENT
Start: 2021-01-20

## 2020-11-23 RX ORDER — SPIRONOLACT/HYDROCHLOROTHIAZID 25 MG-25MG
1200 TABLET ORAL 2 TIMES DAILY
COMMUNITY
End: 2022-10-17 | Stop reason: ALTCHOICE

## 2020-11-23 RX ORDER — OXYCODONE HCL 10 MG/1
10 TABLET, FILM COATED, EXTENDED RELEASE ORAL ONCE
Status: CANCELLED | OUTPATIENT
Start: 2021-01-20 | End: 2020-11-23

## 2020-11-23 RX ORDER — SODIUM CHLORIDE 0.9 % (FLUSH) 0.9 %
10 SYRINGE (ML) INJECTION AS NEEDED
Status: CANCELLED | OUTPATIENT
Start: 2021-01-20

## 2020-11-23 RX ORDER — ONDANSETRON 2 MG/ML
4 INJECTION INTRAMUSCULAR; INTRAVENOUS EVERY 6 HOURS PRN
Status: CANCELLED | OUTPATIENT
Start: 2020-11-23

## 2020-11-23 RX ORDER — CEFAZOLIN SODIUM 2 G/100ML
2 INJECTION, SOLUTION INTRAVENOUS ONCE
Status: CANCELLED | OUTPATIENT
Start: 2021-01-20 | End: 2020-11-23

## 2020-12-03 ENCOUNTER — OFFICE VISIT (OUTPATIENT)
Dept: ORTHOPEDIC SURGERY | Facility: CLINIC | Age: 65
End: 2020-12-03

## 2020-12-03 VITALS — BODY MASS INDEX: 25.91 KG/M2 | TEMPERATURE: 98.2 F | HEIGHT: 68 IN | WEIGHT: 171 LBS

## 2020-12-03 DIAGNOSIS — R52 PAIN: Primary | ICD-10-CM

## 2020-12-03 DIAGNOSIS — M17.11 PRIMARY OSTEOARTHRITIS OF RIGHT KNEE: ICD-10-CM

## 2020-12-03 PROCEDURE — 20610 DRAIN/INJ JOINT/BURSA W/O US: CPT | Performed by: ORTHOPAEDIC SURGERY

## 2020-12-03 PROCEDURE — 73562 X-RAY EXAM OF KNEE 3: CPT | Performed by: ORTHOPAEDIC SURGERY

## 2020-12-03 PROCEDURE — 99203 OFFICE O/P NEW LOW 30 MIN: CPT | Performed by: ORTHOPAEDIC SURGERY

## 2020-12-03 RX ORDER — METHYLPREDNISOLONE ACETATE 80 MG/ML
80 INJECTION, SUSPENSION INTRA-ARTICULAR; INTRALESIONAL; INTRAMUSCULAR; SOFT TISSUE
Status: COMPLETED | OUTPATIENT
Start: 2020-12-03 | End: 2020-12-03

## 2020-12-03 RX ADMIN — METHYLPREDNISOLONE ACETATE 80 MG: 80 INJECTION, SUSPENSION INTRA-ARTICULAR; INTRALESIONAL; INTRAMUSCULAR; SOFT TISSUE at 11:06

## 2020-12-03 NOTE — PROGRESS NOTES
willowPatient: Anne Marie Mueller  YOB: 1955 65 y.o. female  Medical Record Number: [id number]    Chief Complaints:   Chief Complaint   Patient presents with   • Right Knee - Initial Evaluation, Pain       History of Present Illness:Anne Marie Mueller is a 65 y.o. female who presents with moderate right medial knee pain ongoing for a year.  She states it was severe when it started a year ago.  It has been intermittent between moderate and mild since then.  It limits her activity she states she cannot walk a mile.  She has trouble with stairs.  She has an aching about the posterior medial aspect with any weightbearing activity.  Has not had any treatment for it.    Allergies:   Allergies   Allergen Reactions   • Diclofenac Other (See Comments)     Bad heartburn causing pressure in the chest   • Aspartame Itching   • Dye Fdc Red [Red Dye] Swelling   • Morphine Headache   • Dexamethasone GI Intolerance   • Prednisone Rash and GI Intolerance       Medications:   Current Outpatient Medications   Medication Sig Dispense Refill   • acetaminophen (TYLENOL) 500 MG tablet Take 500 mg by mouth Every 6 (Six) Hours As Needed for Mild Pain .     • Apple Tavo Vn-Grn Tea-Bit Or-Cr (Apple Cider Vinegar Plus) tablet Take 1,200 mg by mouth 2 (two) times a day.     • calcium citrate-vitamin d (CITRACAL) 200-250 MG-UNIT tablet tablet Take 1 tablet by mouth Daily.     • cetirizine (zyrTEC) 10 MG tablet Take 10 mg by mouth At Night As Needed for Allergies.     • fluticasone (VERAMYST) 27.5 MCG/SPRAY nasal spray 2 sprays into the nostril(s) as directed by provider Daily As Needed.     • Magnesium 500 MG capsule Take 1 capsule by mouth Daily.     • nadolol (CORGARD) 20 MG tablet Take 30 mg by mouth Daily.     • warfarin (COUMADIN) 4 MG tablet Take 4 mg by mouth Daily.     • warfarin (COUMADIN) 5 MG tablet Take 5 mg by mouth Daily.       No current facility-administered medications for this visit.          The following  "portions of the patient's history were reviewed and updated as appropriate: allergies, current medications, past family history, past medical history, past social history, past surgical history and problem list.    Review of Systems:   A 14 point review of systems was performed. All systems negative except pertinent positives/negative listed in HPI above    Physical Exam:   Vitals:    12/03/20 1040   Temp: 98.2 °F (36.8 °C)   Weight: 77.6 kg (171 lb)   Height: 172.7 cm (68\")   PainSc:   3       General: A and O x 3, ASA, NAD    SCLERA:    Normal    DENTITION:   Normal  Knee:  right    ALIGNMENT:     Varus  ,   Patella  tracks  midline    GAIT:    Antalgic    SKIN:    No abnormality    RANGE OF MOTION:   0  -  120   DEG    STRENGTH:   4  / 5    LIGAMENTS:    No varus / valgus instability.   Negative  Lachman.    MENISCUS:     Negative   Jessie       DISTAL PULSES:    Paplable    DISTAL SENSATION :   Intact    LYMPHATICS:     No   lymphadenopathy    OTHER:          - Positive   effusion      - Crepitance with ROM      Large Joint Arthrocentesis: R knee  Date/Time: 12/3/2020 11:06 AM  Consent given by: patient  Site marked: site marked  Timeout: Immediately prior to procedure a time out was called to verify the correct patient, procedure, equipment, support staff and site/side marked as required   Supporting Documentation  Indications: pain and joint swelling   Procedure Details  Location: knee - R knee  Preparation: Patient was prepped and draped in the usual sterile fashion  Needle size: 22 G  Approach: anterolateral  Medications administered: 80 mg methylPREDNISolone acetate 80 MG/ML; 2 mL lidocaine (cardiac)  Patient tolerance: patient tolerated the procedure well with no immediate complications              Radiology:  Xrays 3views right knee (ap,lateral, sunrise) were ordered and reviewed for evaluation of knee pain demonstrating moderate joint space narrowing medial joint. There are no previous films for " comparision.    Assessment/Plan: Right knee OA- moderately severe. Injected as below. Will send to PT. RTO PRN  .procc      Jero Casarez MD  12/3/2020

## 2020-12-14 PROBLEM — K40.90 LEFT INGUINAL HERNIA: Status: ACTIVE | Noted: 2020-12-14

## 2021-01-07 ENCOUNTER — TRANSCRIBE ORDERS (OUTPATIENT)
Dept: PREADMISSION TESTING | Facility: HOSPITAL | Age: 66
End: 2021-01-07

## 2021-01-07 DIAGNOSIS — Z01.818 OTHER SPECIFIED PRE-OPERATIVE EXAMINATION: Primary | ICD-10-CM

## 2021-01-13 ENCOUNTER — TELEPHONE (OUTPATIENT)
Dept: SURGERY | Facility: CLINIC | Age: 66
End: 2021-01-13

## 2021-01-13 ENCOUNTER — APPOINTMENT (OUTPATIENT)
Dept: PREADMISSION TESTING | Facility: HOSPITAL | Age: 66
End: 2021-01-13

## 2021-01-13 VITALS
OXYGEN SATURATION: 97 % | HEIGHT: 68 IN | RESPIRATION RATE: 20 BRPM | BODY MASS INDEX: 25.6 KG/M2 | WEIGHT: 168.9 LBS | HEART RATE: 64 BPM | DIASTOLIC BLOOD PRESSURE: 64 MMHG | TEMPERATURE: 98.4 F | SYSTOLIC BLOOD PRESSURE: 100 MMHG

## 2021-01-13 DIAGNOSIS — K40.90 LEFT INGUINAL HERNIA: ICD-10-CM

## 2021-01-13 LAB
ABO GROUP BLD: NORMAL
ANION GAP SERPL CALCULATED.3IONS-SCNC: 9.4 MMOL/L (ref 5–15)
BLD GP AB SCN SERPL QL: NEGATIVE
BUN SERPL-MCNC: 19 MG/DL (ref 8–23)
BUN/CREAT SERPL: 25.7 (ref 7–25)
CALCIUM SPEC-SCNC: 9.2 MG/DL (ref 8.6–10.5)
CHLORIDE SERPL-SCNC: 107 MMOL/L (ref 98–107)
CO2 SERPL-SCNC: 25.6 MMOL/L (ref 22–29)
CREAT SERPL-MCNC: 0.74 MG/DL (ref 0.57–1)
DEPRECATED RDW RBC AUTO: 51.7 FL (ref 37–54)
ERYTHROCYTE [DISTWIDTH] IN BLOOD BY AUTOMATED COUNT: 15.7 % (ref 12.3–15.4)
GFR SERPL CREATININE-BSD FRML MDRD: 79 ML/MIN/1.73
GLUCOSE SERPL-MCNC: 76 MG/DL (ref 65–99)
HCT VFR BLD AUTO: 37.1 % (ref 34–46.6)
HGB BLD-MCNC: 12 G/DL (ref 12–15.9)
MCH RBC QN AUTO: 29.3 PG (ref 26.6–33)
MCHC RBC AUTO-ENTMCNC: 32.3 G/DL (ref 31.5–35.7)
MCV RBC AUTO: 90.7 FL (ref 79–97)
PLATELET # BLD AUTO: 396 10*3/MM3 (ref 140–450)
PMV BLD AUTO: 10.3 FL (ref 6–12)
POTASSIUM SERPL-SCNC: 5 MMOL/L (ref 3.5–5.2)
RBC # BLD AUTO: 4.09 10*6/MM3 (ref 3.77–5.28)
RH BLD: POSITIVE
SODIUM SERPL-SCNC: 142 MMOL/L (ref 136–145)
T&S EXPIRATION DATE: NORMAL
WBC # BLD AUTO: 7.34 10*3/MM3 (ref 3.4–10.8)

## 2021-01-13 PROCEDURE — 86901 BLOOD TYPING SEROLOGIC RH(D): CPT

## 2021-01-13 PROCEDURE — 80048 BASIC METABOLIC PNL TOTAL CA: CPT

## 2021-01-13 PROCEDURE — 85027 COMPLETE CBC AUTOMATED: CPT

## 2021-01-13 PROCEDURE — 86900 BLOOD TYPING SEROLOGIC ABO: CPT

## 2021-01-13 PROCEDURE — 86850 RBC ANTIBODY SCREEN: CPT

## 2021-01-13 PROCEDURE — 36415 COLL VENOUS BLD VENIPUNCTURE: CPT

## 2021-01-13 RX ORDER — ZINC GLUCONATE 50 MG
50 TABLET ORAL DAILY
COMMUNITY
Start: 2021-01-01 | End: 2022-10-17 | Stop reason: ALTCHOICE

## 2021-01-13 NOTE — DISCHARGE INSTRUCTIONS
Take the following medications the morning of surgery:  NADOLOL    ARRIVE TO MAIN OR DESK 1- 6:00AM      If you are on prescription narcotic pain medication to control your pain you may also take that medication the morning of surgery.    General Instructions:  • Do not eat solid food after midnight the night before surgery.  • You may drink clear liquids day of surgery but must stop at least one hour before your hospital arrival time.(5:00AM)  • It is beneficial for you to have a clear drink that contains carbohydrates the day of surgery.  We suggest a 12 to 20 ounce bottle of Gatorade or Powerade for non-diabetic patients or a 12 to 20 ounce bottle of G2 or Powerade Zero for diabetic patients. (Pediatric patients, are not advised to drink a 12 to 20 ounce carbohydrate drink)    Clear liquids are liquids you can see through.  Nothing red in color.     Plain water                               Sports drinks  Sodas                                   Gelatin (Jell-O)  Fruit juices without pulp such as white grape juice and apple juice  Popsicles that contain no fruit or yogurt  Tea or coffee (no cream or milk added)  Gatorade / Powerade  G2 / Powerade Zero    • Infants may have breast milk up to four hours before surgery.  • Infants drinking formula may drink formula up to six hours before surgery.   • Patients who avoid smoking, chewing tobacco and alcohol for 4 weeks prior to surgery have a reduced risk of post-operative complications.  Quit smoking as many days before surgery as you can.  • Do not smoke, use chewing tobacco or drink alcohol the day of surgery.   • If applicable bring your C-PAP/ BI-PAP machine.  • Bring any papers given to you in the doctor’s office.  • Wear clean comfortable clothes.  • Do not wear contact lenses, false eyelashes or make-up.  Bring a case for your glasses.   • Bring crutches or walker if applicable.  • Remove all piercings.  Leave jewelry and any other valuables at  home.  • Hair extensions with metal clips must be removed prior to surgery.  • The Pre-Admission Testing nurse will instruct you to bring medications if unable to obtain an accurate list in Pre-Admission Testing.        If you were given a blood bank ID arm band remember to bring it with you the day of surgery.    Preventing a Surgical Site Infection:  • For 2 to 3 days before surgery, avoid shaving with a razor because the razor can irritate skin and make it easier to develop an infection.    • Any areas of open skin can increase the risk of a post-operative wound infection by allowing bacteria to enter and travel throughout the body.  Notify your surgeon if you have any skin wounds / rashes even if it is not near the expected surgical site.  The area will need assessed to determine if surgery should be delayed until it is healed.  • The night prior to surgery shower using a fresh bar of anti-bacterial soap (such as Dial) and clean washcloth.  Sleep in a clean bed with clean clothing.  Do not allow pets to sleep with you.  • Shower on the morning of surgery using a fresh bar of anti-bacterial soap (such as Dial) and clean washcloth.  Dry with a clean towel and dress in clean clothing.  • Ask your surgeon if you will be receiving antibiotics prior to surgery.  • Make sure you, your family, and all healthcare providers clean their hands with soap and water or an alcohol based hand  before caring for you or your wound.    Day of surgery:  Your arrival time is approximately two hours before your scheduled surgery time.  Upon arrival, a Pre-op nurse and Anesthesiologist will review your health history, obtain vital signs, and answer questions you may have.  The only belongings needed at this time will be a list of your home medications and if applicable your C-PAP/BI-PAP machine.  A Pre-op nurse will start an IV and you may receive medication in preparation for surgery, including something to help you relax.      Please be aware that surgery does come with discomfort.  We want to make every effort to control your discomfort so please discuss any uncontrolled symptoms with your nurse.   Your doctor will most likely have prescribed pain medications.      If you are going home after surgery you will receive individualized written care instructions before being discharged.  A responsible adult must drive you to and from the hospital on the day of your surgery and stay with you for 24 hours.  Discharge prescriptions can be filled by the hospital pharmacy during regular pharmacy hours.  If you are having surgery late in the day/evening your prescription may be e-prescribed to your pharmacy.  Please verify your pharmacy hours or chose a 24 hour pharmacy to avoid not having access to your prescription because your pharmacy has closed for the day.    If you are staying overnight following surgery, you will be transported to your hospital room following the recovery period.  [facility] has all private rooms.    If you have any questions please call Pre-Admission Testing at [phone].  Deductibles and co-payments are collected on the day of service. Please be prepared to pay the required co-pay, deductible or deposit on the day of service as defined by your plan.    Patient Education for Self-Quarantine Process    Following your COVID testing, we strongly recommend that you do not leave your home after you have been tested for COVID except to get medical care. This includes not going to work, school or to public areas.  If this is not possible for you to do please limit your activities to only required outings.  Be sure to wear a mask when you are with other people, practice social distancing and wash your hands frequently.      The following items provide additional details to keep you safe.  • Wash your hands with soap and water frequently for at least 20 seconds.   • Avoid touching your eyes, nose and mouth  with unwashed hands.  • Do not share anything - utensils, towels, food from the same bowl.   • Have your own utensils, drinking glass, dishes, towels and bedding.   • Do not have visitors.   • Do use FaceTime to stay in touch with family and friends.  • You should stay in a specific room away from others if possible.   • Stay at least 6 feet away from others in the home if you cannot have a dedicated room to yourself.   • Do not snuggle with your pet. While the CDC says there is no evidence that pets can spread COVID-19 or be infected from humans, it is probably best to avoid “petting, snuggling, being kissed or licked and sharing food (during self-quarantine)”, according to the CDC.   • Sanitize household surfaces daily. Include all high touch areas (door handles, light switches, phones, countertops, etc.)  • Do not share a bathroom with others, if possible.   • Wear a mask around others in your home if you are unable to stay in a separate room or 6 feet apart. If  you are unable to wear a mask, have your family member wear a mask if they must be within 6 feet of you.   Call your surgeon immediately if you experience any of the following symptoms:  • Sore Throat  • Shortness of Breath or difficulty breathing  • Cough  • Chills  • Body soreness or muscle pain  • Headache  • Fever  • New loss of taste or smell  • Do not arrive for your surgery ill.  Your procedure will need to be rescheduled to another time.  You will need to call your physician before the day of surgery to avoid any unnecessary exposure to hospital staff as well as other patients.    CHLORHEXIDINE CLOTH INSTRUCTIONS  The morning of surgery follow these instructions using the Chlorhexidine cloths you've been given.  These steps reduce bacteria on the body.  Do not use the cloths near your eyes, ears mouth, genitalia or on open wounds.  Throw the cloths away after use but do not try to flush them down a toilet.      • Open and remove one cloth at a  time from the package.    • Leave the cloth unfolded and begin the bathing.  • Massage the skin with the cloths using gentle pressure to remove bacteria.  Do not scrub harshly.   • Follow the steps below with one 2% CHG cloth per area (6 total cloths).  • One cloth for neck, shoulders and chest.  • One cloth for both arms, hands, fingers and underarms (do underarms last).  • One cloth for the abdomen followed by groin.  • One cloth for right leg and foot including between the toes.  • One cloth for left leg and foot including between the toes.  • The last cloth is to be used for the back of the neck, back and buttocks.    Allow the CHG to air dry 3 minutes on the skin which will give it time to work and decrease the chance of irritation.  The skin may feel sticky until it is dry.  Do not rinse with water or any other liquid or you will lose the beneficial effects of the CHG.  If mild skin irritation occurs, do rinse the skin to remove the CHG.  Report this to the nurse at time of admission.  Do not apply lotions, creams, ointments, deodorants or perfumes after using the clothes. Dress in clean clothes before coming to the hospital.

## 2021-01-18 ENCOUNTER — LAB (OUTPATIENT)
Dept: LAB | Facility: HOSPITAL | Age: 66
End: 2021-01-18

## 2021-01-18 ENCOUNTER — HOSPITAL ENCOUNTER (OUTPATIENT)
Dept: CT IMAGING | Facility: HOSPITAL | Age: 66
Discharge: HOME | End: 2021-01-18

## 2021-01-18 DIAGNOSIS — K40.90 NON-RECURRENT UNILATERAL INGUINAL HERNIA WITHOUT OBSTRUCTION OR GANGRENE: ICD-10-CM

## 2021-01-18 DIAGNOSIS — Z01.818 OTHER SPECIFIED PRE-OPERATIVE EXAMINATION: ICD-10-CM

## 2021-01-18 PROCEDURE — 74177 CT ABD & PELVIS W/CONTRAST: CPT

## 2021-01-18 PROCEDURE — U0004 COV-19 TEST NON-CDC HGH THRU: HCPCS

## 2021-01-18 PROCEDURE — 0 DIATRIZOATE MEGLUMINE & SODIUM PER 1 ML: Performed by: SURGERY

## 2021-01-18 PROCEDURE — C9803 HOPD COVID-19 SPEC COLLECT: HCPCS

## 2021-01-18 PROCEDURE — 25010000002 IOPAMIDOL 61 % SOLUTION: Performed by: SURGERY

## 2021-01-18 RX ADMIN — IOPAMIDOL 85 ML: 612 INJECTION, SOLUTION INTRAVENOUS at 15:23

## 2021-01-18 RX ADMIN — DIATRIZOATE MEGLUMINE AND DIATRIZOATE SODIUM 30 ML: 660; 100 LIQUID ORAL; RECTAL at 15:23

## 2021-01-19 ENCOUNTER — TELEPHONE (OUTPATIENT)
Dept: SURGERY | Facility: CLINIC | Age: 66
End: 2021-01-19

## 2021-01-19 ENCOUNTER — APPOINTMENT (OUTPATIENT)
Dept: OTHER | Facility: HOSPITAL | Age: 66
End: 2021-01-19

## 2021-01-19 DIAGNOSIS — Z09 FOLLOW UP: ICD-10-CM

## 2021-01-19 LAB — SARS-COV-2 RNA RESP QL NAA+PROBE: NOT DETECTED

## 2021-01-19 NOTE — TELEPHONE ENCOUNTER
Patient called this morning requesting the results for her CT done yesterday 1/18/21 at [facility]. States that radiology was waiting to attain her 7/23/2020 CT images from [place] and she is concerned the results will not be available by her surgery tomorrow. Advised that the results are not finalized yet, but the radiologist is aware she has surgery tomorrow (it is mentioned in imaging notes) and will likely have this completed today. Patient insisted that I send a message to serve as a reminder to watch for her results. Understandably she is concerned that the results will not be available prior to surgery.

## 2021-01-20 ENCOUNTER — HOSPITAL ENCOUNTER (OUTPATIENT)
Facility: HOSPITAL | Age: 66
Setting detail: HOSPITAL OUTPATIENT SURGERY
Discharge: HOME | End: 2021-01-20
Attending: SURGERY | Admitting: SURGERY

## 2021-01-20 ENCOUNTER — ANESTHESIA (OUTPATIENT)
Dept: PERIOP | Facility: HOSPITAL | Age: 66
End: 2021-01-20

## 2021-01-20 ENCOUNTER — ANESTHESIA EVENT (OUTPATIENT)
Dept: PERIOP | Facility: HOSPITAL | Age: 66
End: 2021-01-20

## 2021-01-20 VITALS
BODY MASS INDEX: 25.85 KG/M2 | HEIGHT: 68 IN | WEIGHT: 170.6 LBS | HEART RATE: 47 BPM | SYSTOLIC BLOOD PRESSURE: 105 MMHG | DIASTOLIC BLOOD PRESSURE: 63 MMHG | RESPIRATION RATE: 16 BRPM | OXYGEN SATURATION: 97 % | TEMPERATURE: 97.4 F

## 2021-01-20 DIAGNOSIS — K43.9 VENTRAL HERNIA WITHOUT OBSTRUCTION OR GANGRENE: Primary | ICD-10-CM

## 2021-01-20 DIAGNOSIS — K40.90 LEFT INGUINAL HERNIA: ICD-10-CM

## 2021-01-20 LAB
INR PPP: 1.48 (ref 0.9–1.1)
PROTHROMBIN TIME: 17.7 SECONDS (ref 11.7–14.2)

## 2021-01-20 PROCEDURE — 25010000002 ONDANSETRON PER 1 MG: Performed by: SURGERY

## 2021-01-20 PROCEDURE — 49568 PR IMPLANT MESH HERNIA REPAIR/DEBRIDEMENT CLOSURE: CPT | Performed by: PHYSICIAN ASSISTANT

## 2021-01-20 PROCEDURE — C9290 INJ, BUPIVACAINE LIPOSOME: HCPCS | Performed by: SURGERY

## 2021-01-20 PROCEDURE — 63710000001 PROMETHAZINE PER 25 MG: Performed by: NURSE ANESTHETIST, CERTIFIED REGISTERED

## 2021-01-20 PROCEDURE — 25010000002 ONDANSETRON PER 1 MG: Performed by: NURSE ANESTHETIST, CERTIFIED REGISTERED

## 2021-01-20 PROCEDURE — 85610 PROTHROMBIN TIME: CPT | Performed by: SURGERY

## 2021-01-20 PROCEDURE — 49560 PR REPAIR INCISIONAL HERNIA,REDUCIBLE: CPT | Performed by: SURGERY

## 2021-01-20 PROCEDURE — 49560 PR REPAIR INCISIONAL HERNIA,REDUCIBLE: CPT | Performed by: PHYSICIAN ASSISTANT

## 2021-01-20 PROCEDURE — 25010000002 PROPOFOL 10 MG/ML EMULSION: Performed by: NURSE ANESTHETIST, CERTIFIED REGISTERED

## 2021-01-20 PROCEDURE — 25010000003 CEFAZOLIN IN DEXTROSE 2-4 GM/100ML-% SOLUTION: Performed by: SURGERY

## 2021-01-20 PROCEDURE — 25010000002 FENTANYL CITRATE (PF) 100 MCG/2ML SOLUTION: Performed by: NURSE ANESTHETIST, CERTIFIED REGISTERED

## 2021-01-20 PROCEDURE — 25010000002 HYDROMORPHONE PER 4 MG: Performed by: NURSE ANESTHETIST, CERTIFIED REGISTERED

## 2021-01-20 PROCEDURE — C1781 MESH (IMPLANTABLE): HCPCS | Performed by: SURGERY

## 2021-01-20 PROCEDURE — 49568 PR IMPLANT MESH HERNIA REPAIR/DEBRIDEMENT CLOSURE: CPT | Performed by: SURGERY

## 2021-01-20 PROCEDURE — 25010000003 BUPIVACAINE LIPOSOME 1.3 % SUSPENSION 20 ML VIAL: Performed by: SURGERY

## 2021-01-20 DEVICE — BARD MESH
Type: IMPLANTABLE DEVICE | Site: ABDOMEN | Status: FUNCTIONAL
Brand: BARD MESH

## 2021-01-20 RX ORDER — PROMETHAZINE HYDROCHLORIDE 25 MG/1
25 SUPPOSITORY RECTAL ONCE AS NEEDED
Status: COMPLETED | OUTPATIENT
Start: 2021-01-20 | End: 2021-01-20

## 2021-01-20 RX ORDER — DIPHENHYDRAMINE HCL 25 MG
25 CAPSULE ORAL
Status: DISCONTINUED | OUTPATIENT
Start: 2021-01-20 | End: 2021-01-20 | Stop reason: HOSPADM

## 2021-01-20 RX ORDER — ONDANSETRON 4 MG/1
4 TABLET, FILM COATED ORAL EVERY 8 HOURS PRN
Qty: 20 TABLET | Refills: 0 | Status: SHIPPED | OUTPATIENT
Start: 2021-01-20 | End: 2021-02-01

## 2021-01-20 RX ORDER — SODIUM CHLORIDE, SODIUM LACTATE, POTASSIUM CHLORIDE, CALCIUM CHLORIDE 600; 310; 30; 20 MG/100ML; MG/100ML; MG/100ML; MG/100ML
9 INJECTION, SOLUTION INTRAVENOUS CONTINUOUS
Status: DISCONTINUED | OUTPATIENT
Start: 2021-01-20 | End: 2021-01-20 | Stop reason: HOSPADM

## 2021-01-20 RX ORDER — OXYCODONE AND ACETAMINOPHEN 7.5; 325 MG/1; MG/1
1 TABLET ORAL ONCE AS NEEDED
Status: DISCONTINUED | OUTPATIENT
Start: 2021-01-20 | End: 2021-01-20 | Stop reason: HOSPADM

## 2021-01-20 RX ORDER — PROMETHAZINE HYDROCHLORIDE 25 MG/1
25 TABLET ORAL ONCE AS NEEDED
Status: COMPLETED | OUTPATIENT
Start: 2021-01-20 | End: 2021-01-20

## 2021-01-20 RX ORDER — ONDANSETRON 2 MG/ML
4 INJECTION INTRAMUSCULAR; INTRAVENOUS EVERY 6 HOURS PRN
Status: DISCONTINUED | OUTPATIENT
Start: 2021-01-20 | End: 2021-01-20 | Stop reason: HOSPADM

## 2021-01-20 RX ORDER — HYDRALAZINE HYDROCHLORIDE 20 MG/ML
5 INJECTION INTRAMUSCULAR; INTRAVENOUS
Status: DISCONTINUED | OUTPATIENT
Start: 2021-01-20 | End: 2021-01-20 | Stop reason: HOSPADM

## 2021-01-20 RX ORDER — CEFAZOLIN SODIUM 2 G/100ML
2 INJECTION, SOLUTION INTRAVENOUS ONCE
Status: COMPLETED | OUTPATIENT
Start: 2021-01-20 | End: 2021-01-20

## 2021-01-20 RX ORDER — LIDOCAINE HYDROCHLORIDE 20 MG/ML
INJECTION, SOLUTION INFILTRATION; PERINEURAL AS NEEDED
Status: DISCONTINUED | OUTPATIENT
Start: 2021-01-20 | End: 2021-01-20 | Stop reason: SURG

## 2021-01-20 RX ORDER — FLUMAZENIL 0.1 MG/ML
0.2 INJECTION INTRAVENOUS AS NEEDED
Status: DISCONTINUED | OUTPATIENT
Start: 2021-01-20 | End: 2021-01-20 | Stop reason: HOSPADM

## 2021-01-20 RX ORDER — BUPIVACAINE HYDROCHLORIDE AND EPINEPHRINE 5; 5 MG/ML; UG/ML
INJECTION, SOLUTION PERINEURAL AS NEEDED
Status: DISCONTINUED | OUTPATIENT
Start: 2021-01-20 | End: 2021-01-20 | Stop reason: HOSPADM

## 2021-01-20 RX ORDER — FENTANYL CITRATE 50 UG/ML
50 INJECTION, SOLUTION INTRAMUSCULAR; INTRAVENOUS
Status: DISCONTINUED | OUTPATIENT
Start: 2021-01-20 | End: 2021-01-20 | Stop reason: HOSPADM

## 2021-01-20 RX ORDER — HYDROCODONE BITARTRATE AND ACETAMINOPHEN 5; 325 MG/1; MG/1
1 TABLET ORAL EVERY 4 HOURS PRN
Qty: 10 TABLET | Refills: 0 | Status: SHIPPED | OUTPATIENT
Start: 2021-01-20 | End: 2021-02-01

## 2021-01-20 RX ORDER — ONDANSETRON 2 MG/ML
4 INJECTION INTRAMUSCULAR; INTRAVENOUS ONCE AS NEEDED
Status: COMPLETED | OUTPATIENT
Start: 2021-01-20 | End: 2021-01-20

## 2021-01-20 RX ORDER — SODIUM CHLORIDE 0.9 % (FLUSH) 0.9 %
3-10 SYRINGE (ML) INJECTION AS NEEDED
Status: DISCONTINUED | OUTPATIENT
Start: 2021-01-20 | End: 2021-01-20 | Stop reason: HOSPADM

## 2021-01-20 RX ORDER — SODIUM CHLORIDE 0.9 % (FLUSH) 0.9 %
10 SYRINGE (ML) INJECTION AS NEEDED
Status: DISCONTINUED | OUTPATIENT
Start: 2021-01-20 | End: 2021-01-20 | Stop reason: HOSPADM

## 2021-01-20 RX ORDER — FENTANYL CITRATE 50 UG/ML
INJECTION, SOLUTION INTRAMUSCULAR; INTRAVENOUS AS NEEDED
Status: DISCONTINUED | OUTPATIENT
Start: 2021-01-20 | End: 2021-01-20 | Stop reason: SURG

## 2021-01-20 RX ORDER — HYDROMORPHONE HYDROCHLORIDE 1 MG/ML
0.5 INJECTION, SOLUTION INTRAMUSCULAR; INTRAVENOUS; SUBCUTANEOUS
Status: DISCONTINUED | OUTPATIENT
Start: 2021-01-20 | End: 2021-01-20 | Stop reason: HOSPADM

## 2021-01-20 RX ORDER — EPHEDRINE SULFATE 50 MG/ML
5 INJECTION, SOLUTION INTRAVENOUS ONCE AS NEEDED
Status: DISCONTINUED | OUTPATIENT
Start: 2021-01-20 | End: 2021-01-20 | Stop reason: HOSPADM

## 2021-01-20 RX ORDER — PROPOFOL 10 MG/ML
VIAL (ML) INTRAVENOUS AS NEEDED
Status: DISCONTINUED | OUTPATIENT
Start: 2021-01-20 | End: 2021-01-20 | Stop reason: SURG

## 2021-01-20 RX ORDER — OXYCODONE HCL 10 MG/1
10 TABLET, FILM COATED, EXTENDED RELEASE ORAL ONCE
Status: COMPLETED | OUTPATIENT
Start: 2021-01-20 | End: 2021-01-20

## 2021-01-20 RX ORDER — SODIUM CHLORIDE 0.9 % (FLUSH) 0.9 %
3 SYRINGE (ML) INJECTION EVERY 12 HOURS SCHEDULED
Status: DISCONTINUED | OUTPATIENT
Start: 2021-01-20 | End: 2021-01-20 | Stop reason: HOSPADM

## 2021-01-20 RX ORDER — ROCURONIUM BROMIDE 10 MG/ML
INJECTION, SOLUTION INTRAVENOUS AS NEEDED
Status: DISCONTINUED | OUTPATIENT
Start: 2021-01-20 | End: 2021-01-20 | Stop reason: SURG

## 2021-01-20 RX ORDER — LIDOCAINE HYDROCHLORIDE 10 MG/ML
0.5 INJECTION, SOLUTION EPIDURAL; INFILTRATION; INTRACAUDAL; PERINEURAL ONCE AS NEEDED
Status: DISCONTINUED | OUTPATIENT
Start: 2021-01-20 | End: 2021-01-20 | Stop reason: HOSPADM

## 2021-01-20 RX ORDER — LABETALOL HYDROCHLORIDE 5 MG/ML
5 INJECTION, SOLUTION INTRAVENOUS
Status: DISCONTINUED | OUTPATIENT
Start: 2021-01-20 | End: 2021-01-20 | Stop reason: HOSPADM

## 2021-01-20 RX ORDER — ONDANSETRON 2 MG/ML
INJECTION INTRAMUSCULAR; INTRAVENOUS AS NEEDED
Status: DISCONTINUED | OUTPATIENT
Start: 2021-01-20 | End: 2021-01-20 | Stop reason: SURG

## 2021-01-20 RX ORDER — MAGNESIUM HYDROXIDE 1200 MG/15ML
LIQUID ORAL AS NEEDED
Status: DISCONTINUED | OUTPATIENT
Start: 2021-01-20 | End: 2021-01-20 | Stop reason: HOSPADM

## 2021-01-20 RX ORDER — DIPHENHYDRAMINE HYDROCHLORIDE 50 MG/ML
12.5 INJECTION INTRAMUSCULAR; INTRAVENOUS
Status: DISCONTINUED | OUTPATIENT
Start: 2021-01-20 | End: 2021-01-20 | Stop reason: HOSPADM

## 2021-01-20 RX ORDER — FAMOTIDINE 10 MG/ML
20 INJECTION, SOLUTION INTRAVENOUS ONCE
Status: COMPLETED | OUTPATIENT
Start: 2021-01-20 | End: 2021-01-20

## 2021-01-20 RX ORDER — HYDROCODONE BITARTRATE AND ACETAMINOPHEN 7.5; 325 MG/1; MG/1
1 TABLET ORAL ONCE AS NEEDED
Status: DISCONTINUED | OUTPATIENT
Start: 2021-01-20 | End: 2021-01-20 | Stop reason: HOSPADM

## 2021-01-20 RX ORDER — NALOXONE HCL 0.4 MG/ML
0.2 VIAL (ML) INJECTION AS NEEDED
Status: DISCONTINUED | OUTPATIENT
Start: 2021-01-20 | End: 2021-01-20 | Stop reason: HOSPADM

## 2021-01-20 RX ADMIN — FAMOTIDINE 20 MG: 10 INJECTION INTRAVENOUS at 07:36

## 2021-01-20 RX ADMIN — SODIUM CHLORIDE, POTASSIUM CHLORIDE, SODIUM LACTATE AND CALCIUM CHLORIDE 9 ML/HR: 600; 310; 30; 20 INJECTION, SOLUTION INTRAVENOUS at 06:51

## 2021-01-20 RX ADMIN — PROMETHAZINE HYDROCHLORIDE 25 MG: 25 TABLET ORAL at 10:29

## 2021-01-20 RX ADMIN — LIDOCAINE HYDROCHLORIDE 100 MG: 20 INJECTION, SOLUTION INFILTRATION; PERINEURAL at 08:16

## 2021-01-20 RX ADMIN — OXYCODONE HYDROCHLORIDE 10 MG: 10 TABLET, FILM COATED, EXTENDED RELEASE ORAL at 07:06

## 2021-01-20 RX ADMIN — ROCURONIUM BROMIDE 40 MG: 10 INJECTION INTRAVENOUS at 08:16

## 2021-01-20 RX ADMIN — ONDANSETRON HYDROCHLORIDE 4 MG: 2 SOLUTION INTRAMUSCULAR; INTRAVENOUS at 09:19

## 2021-01-20 RX ADMIN — ROCURONIUM BROMIDE 10 MG: 10 INJECTION INTRAVENOUS at 08:51

## 2021-01-20 RX ADMIN — PROPOFOL 200 MG: 10 INJECTION, EMULSION INTRAVENOUS at 08:16

## 2021-01-20 RX ADMIN — HYDROMORPHONE HYDROCHLORIDE 0.5 MG: 1 INJECTION, SOLUTION INTRAMUSCULAR; INTRAVENOUS; SUBCUTANEOUS at 10:07

## 2021-01-20 RX ADMIN — CEFAZOLIN SODIUM 2 G: 2 INJECTION, SOLUTION INTRAVENOUS at 08:03

## 2021-01-20 RX ADMIN — ONDANSETRON 4 MG: 2 INJECTION INTRAMUSCULAR; INTRAVENOUS at 09:50

## 2021-01-20 RX ADMIN — ONDANSETRON 4 MG: 2 INJECTION INTRAMUSCULAR; INTRAVENOUS at 11:53

## 2021-01-20 RX ADMIN — FENTANYL CITRATE 100 MCG: 50 INJECTION INTRAMUSCULAR; INTRAVENOUS at 08:05

## 2021-01-20 NOTE — PERIOPERATIVE NURSING NOTE
Lab called and needs another INR.  Dr. Jacobo at bedside.  She instructed to send patient to OR once lab is redrawn and sent to lab, do not have to wait for results.  DANNIELLE Reza RN

## 2021-01-20 NOTE — OP NOTE
SURGERY  Operative Note :  ODALIS    Anne Marie Mueller  1955    Procedure Date: 01/20/21    Pre-op Diagnosis:  · Left inguinal vs incisional hernia, symptomatic, at risk for incarceration.  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    Post-op Diagnosis:  · Left suprapubic ventral hernia    Procedure:   · Open left suprapubic ventral hernia repair with mesh, extraperitoneal    Surgeon: Odalis    Assistant: Chiki    Indications:  · Nice 65-year-old with a very complex history with a spontaneous splenic/portal vein thrombosis, unknown etiology with portal hypertension and dilated veins even including the gonadal, whose had midline incisions and now presents with a hernia in the left suprapubic region, believed to be inguinal on exam, then by CT found to be ventral with contained fat    Associated Issues:  Significant comorbidities with portal hypertension, and 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  I think this puts her in a situation where a laparoscopic approach would not be suitable    Findings:   · Defect found just to the left of the midline, with incarcerated fat, without attached round ligament, consistent with a ventral hernia rather than inguinal.  · Muscle layers able to be reapproximated with 1 inch x 3 inch polypropylene mesh placed outside the transversus and thus completely outside the abdominal cavity.    Recommendations:   · Routine recuperation, with discharge today, with Exparel and Marcaine used for postoperative pain control    Specimens:   · None    EBL: Less than 25 cc    Technique:     General anesthetic was induced, left groin clipped prepped with ChloraPrep and draped sterilely.  As I began to palpate for marking, I felt the fat reduced and it was lower than I had anticipated.  I did made a edin directly over that and then extended it up laterally in the event that it was inguinal along the  course of a routine inguinal incision.    I then used Marcaine, made an incision with a 15 blade, carried it down through the subcutaneous tissue and noted no hernia at Galina's.  Galina's was open and as I went more deeply I could tell that there was tissue that was coming in and out of a defect that I could palpate with my finger.  Once I divided all the areolar tissue around this, it was clear that it was just off the midline, not necessarily involved with the incision, but was not consistent with a an inguinal necessarily.  Because it was considerable amount of fat, and it was coming out at about the area of the external ring, I went ahead and opened the external oblique longitudinally to see if the fat extended up to the internal ring and if there was an attached round ligament consistent with an inguinal, or a sac.  I dissected all of this free and it was easily reduced and not consistent with an inguinal.  I did however use a spoon to keep that reduced while I dissected out all the layers.  The transversus was actually intact with just a small area of the defect which was seen on the CT scan, the internal and the external oblique.    I thus reapproximated the transversus with a running 3-0 Vicryl.  The ilioinguinal nerve was kept intact, the ileal inguinal having already been divided.  I injected with the Marcaine and Exparel in the area of the nerves.    I then took the mesh and secured it at the pubic tubercle and ran it obliquely along the opening that I had made of the muscle fibers to ensure that it was not an inguinal hernia, covering that closure completely.  I then closed the internal oblique over that with a running 3-0 Vicryl, then the external oblique with a running 3-0 Vicryl.  Thus she has 1 layer of muscle below the mesh, and 2 above.  At no time did we have to divide any intraperitoneal or extraperitoneal vessels.  I did have to tie off 1 small vein in the subcutaneous tissue which I did  because of her issues with anticoagulation.    I then closed Galina's with interrupted 0 Vicryl, dermis with interrupted 3-0 Vicryl, skin with running 4-0 Vicryl.  Exofin.    Yoanna Jacobo MD  01/20/21  09:37 EST

## 2021-01-20 NOTE — ANESTHESIA PREPROCEDURE EVALUATION
Anesthesia Evaluation     Patient summary reviewed and Nursing notes reviewed   no history of anesthetic complications:  NPO Solid Status: > 6 hours             Airway   Mallampati: II  TM distance: >3 FB  Neck ROM: full  No difficulty expected  Dental - normal exam     Pulmonary - normal exam   (-) not a smoker  Cardiovascular - normal exam    ECG reviewed  PT is on anticoagulation therapy    (+) hypertension,   (-) angina      Neuro/Psych  GI/Hepatic/Renal/Endo    (+)  hiatal hernia,      ROS Comment: Pt w/ hx/o mesenteric venous thrombosis w/ mild portal HTN and esophageal varices on coumadin last dose Wednesday    Musculoskeletal     Abdominal    Substance History      OB/GYN          Other                        Anesthesia Plan    ASA 3     general       Anesthetic plan, all risks, benefits, and alternatives have been provided, discussed and informed consent has been obtained with: patient.

## 2021-01-20 NOTE — ANESTHESIA POSTPROCEDURE EVALUATION
Patient: Anne Marie Mueller    Procedure Summary     Date: 01/20/21 Room / Location: [facility] OR [address] [place] [address] MAIN OR    Anesthesia Start: 0802 Anesthesia Stop: 0938    Procedure: open ventral hernia repair with mesh (Left Abdomen) Diagnosis:       Left inguinal hernia      (Left inguinal hernia [K40.90])    Surgeon: Yoanna Jacobo MD Provider: Dwight Arellano MD    Anesthesia Type: general ASA Status: 3          Anesthesia Type: general    Vitals  Vitals Value Taken Time   BP 90/68 01/20/21 1045   Temp 36.3 °C (97.4 °F) 01/20/21 0932   Pulse 58 01/20/21 1030   Resp 16 01/20/21 1030   SpO2 96 % 01/20/21 1046   Vitals shown include unvalidated device data.        Post Anesthesia Care and Evaluation    Patient location during evaluation: bedside  Patient participation: complete - patient participated  Level of consciousness: awake  Pain management: adequate  Airway patency: patent  Anesthetic complications: No anesthetic complications    Cardiovascular status: acceptable  Respiratory status: acceptable  Hydration status: acceptable

## 2021-01-20 NOTE — H&P
"SURGERY  Anne Marie Mueller   1955  01/20/21       Chief Complaint:  left inguinal hernia     HPI    Here for hernia repair in left groin area.      Ms. Mueller is a nice 65 y.o. female who presents with a left inguinal hernia.  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  She had exploratory surgery for that, when they thought in fact that she had ovarian cancer, but discovered it was not.  She is also been hospitalized with a small bowel obstruction without surgery, spontaneously resolving.  She is cared for by Dr. Mode Erwin as far as her GI issues.     He was placed on Coumadin at the time of her diagnosis and has stayed on it since then, but says she is very frequently off of that for interventions without any problems and without the need for Lovenox bridge.  She has requested that that not be used.     She also has a very complicated ureteral history with dilatation and is cared for by Dr. Jerman Desir.     Now she presents with pain in the left groin, significant enough that she \"saw stars\" when her cat jumped on her.  She has pain however of a constant nature even when she is leaning against the counter.  She has occasional nausea associated with this.  There is even a sense of pressure even when she is just laying in bed.  She does feel that there is tissue that can go in and out.     Despite all of this, based on her complex history, she is very concerned about proceeding with surgery.  To that end, I have called her primary care doctor, who curiously is named Anne Marie Mueller (no relation) to discuss.     Medical History        Past Medical History:   Diagnosis Date   • Acute torn meniscus       Right   • Arthritis       DEGENERATIVE HANDS AND FEET   • Esophageal varices (CMS/HCC)     • Hypertension     • " Mesenteric venous thrombosis (CMS/HCC)       PORTAL AND SPLENIC   • Portal hypertension (CMS/HCC)     • Splenomegaly          Surgical History         Past Surgical History:   Procedure Laterality Date   • APPENDECTOMY N/A 1985   • COLONOSCOPY N/A 2019   • CYSTOSCOPY Bilateral 10/14/2020     Procedure: CYSTOSCOPY RETROGRADE PYELOGRAM;  Surgeon: Jaiden Desir MD;  Location: [facility] MAIN OR;  Service: Urology;  Laterality: Bilateral;   • ENDOSCOPY N/A 2019   • EXPLORATORY LAPAROTOMY N/A 2001   • LAPAROSCOPIC CHOLECYSTECTOMY N/A 2001   • TONSILLECTOMY       • URETERAL STENT INSERTION                  Family History   Problem Relation Age of Onset   • Breast cancer Sister     • Cancer Sister           Palate   • Malig Hyperthermia Neg Hx        Social History   Social History            Socioeconomic History   • Marital status:        Spouse name: Not on file   • Number of children: 3   • Years of education: Not on file   • Highest education level: Not on file   Tobacco Use   • Smoking status: Never Smoker   • Smokeless tobacco: Never Used   Substance and Sexual Activity   • Drug use: Never   • Sexual activity: Defer              Current Outpatient Medications:   •  acetaminophen (TYLENOL) 500 MG tablet, Take 500 mg by mouth Every 6 (Six) Hours As Needed for Mild Pain ., Disp: , Rfl:   •  Apple Tavo Vn-Grn Tea-Bit Or-Cr (Apple Cider Vinegar Plus) tablet, Take 1,200 mg by mouth 2 (two) times a day., Disp: , Rfl:   •  calcium citrate-vitamin d (CITRACAL) 200-250 MG-UNIT tablet tablet, Take 1 tablet by mouth Daily., Disp: , Rfl:   •  cetirizine (zyrTEC) 10 MG tablet, Take 10 mg by mouth At Night As Needed for Allergies., Disp: , Rfl:   •  Magnesium 500 MG capsule, Take 1 capsule by mouth Daily., Disp: , Rfl:   •  nadolol (CORGARD) 20 MG tablet, Take 30 mg by mouth Daily., Disp: , Rfl:   •  warfarin (COUMADIN) 4 MG tablet, Take 4 mg by mouth Daily., Disp: , Rfl:   •  warfarin (COUMADIN) 5 MG tablet, Take 5 mg  "by mouth Daily., Disp: , Rfl:   •  fluticasone (VERAMYST) 27.5 MCG/SPRAY nasal spray, 2 sprays into the nostril(s) as directed by provider Daily As Needed., Disp: , Rfl:            Allergies   Allergen Reactions   • Diclofenac Other (See Comments)       Bad heartburn causing pressure in the chest   • Aspartame Itching   • Dye Fdc Red [Red Dye] Swelling   • Morphine Headache   • Dexamethasone GI Intolerance   • Prednisone Rash and GI Intolerance      Review of Systems  Positive for abdominal distention and pain, diarrhea and nausea, difficulty urinating with urine decreased, joint pain back pain gait problem and joint swelling neck stiffness environmental and food allergies.  All other systems reviewed and negative.     Vitals       Vitals:     11/23/20 1315   Weight: 77.7 kg (171 lb 3.2 oz)   Height: 172.7 cm (68\")           PHYSICAL EXAM:     Ht 172.7 cm (68\")   Wt 77.7 kg (171 lb 3.2 oz)   BMI 26.03 kg/m²   Body mass index is 26.03 kg/m².     Constitutional: well developed, well nourished, appears  healthy, stated age  Eyes: sclera nonicteric, conjunctiva not injected   ENMT: Hearing intact, trachea midline, dentitia not seen with mask in place  CVS: RRR, no murmur, peripheral edema not present  Respiratory: CTA, normal respiratory effort   Gastrointestinal: no hepatosplenomegaly could be palpated, though she is very distended both visually and by exam with a somewhat tight abdomen, abdomen tender, abdominal hernia not detected with midline incisional scar, some mild suggestion of a caput medusa developing  Genitourinary: inguinal hernia palpable in the left groin, reducible, about 1-1/2 to 2 cm diameter, just the right size to allow bowel to get incarcerated  Musculoskeletal: gait normal, muscle mass normal  Skin: warm and dry, no rashes visible  Neurological: awake and alert, seems to have reasonable capacity for understanding for medical decision making  Psychiatric: appears to have reasonable judgement, " somewhat anxious, but understandably  Lymphatics: no cervical adenopathy     Radiographic/Lab Findings: CT scan with findings of marked thickening of the gastric cardia circumferentially (Dr. Erwin is reviewed and does not feel its significantly changed), severe left hydronephrosis without focal narrowing, spleen significantly enlarged at 16.2 cm, perihepatic and perisplenic fluid seen small amount, small left inguinal hernia containing ascitic fluid at the mesentery is diffusely attenuated nonspecific.     Pamphlet reviewed: Inguinal hernia     IMPRESSION:  · Left inguinal hernia, symptomatic, and I think of the appropriate size for fairly significant risk for incarceration.  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  I like to stay out of the peritoneum as I really want to avoid an ascitic leak.  I also think trying to do so laparoscopically puts her at undue risk for rebleed, either from a recanalized falciform ligament/umbilical vein or from an extraperitoneal bleed if done extraperitoneally.  · Could be done as outpatient  · Hold Coumadin for 4 days preop  · Call put in to Dr. Deng for to discuss, the patient indicating that she has a sister who is a dermatologist that we will want to discuss the case with Dr. Mueller.     Yoanna Jacobo MD  11/23/2020     ADDEND  Patient has requested a CT prior to surgery.  She had one last summer at an outside facility so i don't think it is critical, and that her hernia may not show up on the CT since she will be recumbent, but she does have a truly complex history, so i think it is reasonable.  Order placed for [facility].  This will be dramatically more helpful than getting it at an outside  facility..  Yoanna Jacobo MD  01/13/21      ADDEND  CT shows a ventral hernia just to the left of the midline at the pubis.  Will proceed as planned.  Yoanna Jacobo MD

## 2021-01-20 NOTE — ANESTHESIA PROCEDURE NOTES
Airway  Urgency: elective    Date/Time: 1/20/2021 8:19 AM  Airway not difficult    General Information and Staff    Patient location during procedure: OR  Anesthesiologist: Dwight Arellano MD  CRNA: Kristin Handley CRNA    Indications and Patient Condition  Indications for airway management: airway protection    Preoxygenated: yes  MILS not maintained throughout  Mask difficulty assessment: 1 - vent by mask    Final Airway Details  Final airway type: endotracheal airway      Successful airway: ETT  Cuffed: yes   Successful intubation technique: direct laryngoscopy  Endotracheal tube insertion site: oral  Blade: Linwood  Blade size: 3  ETT size (mm): 7.0  Cormack-Lehane Classification: grade I - full view of glottis  Placement verified by: chest auscultation and capnometry   Cuff volume (mL): 7  Measured from: lips  ETT/EBT  to lips (cm): 21  Number of attempts at approach: 1  Assessment: lips, teeth, and gum same as pre-op and atraumatic intubation    Additional Comments  Pt preoxygenated prior to induction, easy mask airway, atraumatic intubation,+ ETCO2, + bs bilat,  ETT secured and connected to ventilator.

## 2021-01-22 ENCOUNTER — TELEPHONE (OUTPATIENT)
Dept: SURGERY | Facility: CLINIC | Age: 66
End: 2021-01-22

## 2021-01-22 ENCOUNTER — HOSPITAL ENCOUNTER (OUTPATIENT)
Dept: CARDIOLOGY | Facility: HOSPITAL | Age: 66
Discharge: HOME | End: 2021-01-22
Admitting: SURGERY

## 2021-01-22 DIAGNOSIS — M79.605 LEFT LEG PAIN: ICD-10-CM

## 2021-01-22 DIAGNOSIS — M79.605 LEFT LEG PAIN: Primary | ICD-10-CM

## 2021-01-22 LAB
BH CV LOWER VASCULAR LEFT COMMON FEMORAL AUGMENT: NORMAL
BH CV LOWER VASCULAR LEFT COMMON FEMORAL COMPETENT: NORMAL
BH CV LOWER VASCULAR LEFT COMMON FEMORAL COMPRESS: NORMAL
BH CV LOWER VASCULAR LEFT COMMON FEMORAL PHASIC: NORMAL
BH CV LOWER VASCULAR LEFT COMMON FEMORAL SPONT: NORMAL
BH CV LOWER VASCULAR LEFT DISTAL FEMORAL COMPRESS: NORMAL
BH CV LOWER VASCULAR LEFT GASTRONEMIUS COMPRESS: NORMAL
BH CV LOWER VASCULAR LEFT GREATER SAPH AK COMPRESS: NORMAL
BH CV LOWER VASCULAR LEFT GREATER SAPH BK COMPRESS: NORMAL
BH CV LOWER VASCULAR LEFT LESSER SAPH COMPRESS: NORMAL
BH CV LOWER VASCULAR LEFT MID FEMORAL AUGMENT: NORMAL
BH CV LOWER VASCULAR LEFT MID FEMORAL COMPETENT: NORMAL
BH CV LOWER VASCULAR LEFT MID FEMORAL COMPRESS: NORMAL
BH CV LOWER VASCULAR LEFT MID FEMORAL PHASIC: NORMAL
BH CV LOWER VASCULAR LEFT MID FEMORAL SPONT: NORMAL
BH CV LOWER VASCULAR LEFT PERONEAL COMPRESS: NORMAL
BH CV LOWER VASCULAR LEFT POPLITEAL AUGMENT: NORMAL
BH CV LOWER VASCULAR LEFT POPLITEAL COMPETENT: NORMAL
BH CV LOWER VASCULAR LEFT POPLITEAL COMPRESS: NORMAL
BH CV LOWER VASCULAR LEFT POPLITEAL PHASIC: NORMAL
BH CV LOWER VASCULAR LEFT POPLITEAL SPONT: NORMAL
BH CV LOWER VASCULAR LEFT POSTERIOR TIBIAL COMPRESS: NORMAL
BH CV LOWER VASCULAR LEFT PROFUNDA FEMORAL COMPRESS: NORMAL
BH CV LOWER VASCULAR LEFT PROXIMAL FEMORAL COMPRESS: NORMAL
BH CV LOWER VASCULAR LEFT SAPHENOFEMORAL JUNCTION COMPRESS: NORMAL
BH CV LOWER VASCULAR RIGHT COMMON FEMORAL AUGMENT: NORMAL
BH CV LOWER VASCULAR RIGHT COMMON FEMORAL COMPETENT: NORMAL
BH CV LOWER VASCULAR RIGHT COMMON FEMORAL COMPRESS: NORMAL
BH CV LOWER VASCULAR RIGHT COMMON FEMORAL PHASIC: NORMAL
BH CV LOWER VASCULAR RIGHT COMMON FEMORAL SPONT: NORMAL

## 2021-01-22 PROCEDURE — 93971 EXTREMITY STUDY: CPT

## 2021-01-22 NOTE — TELEPHONE ENCOUNTER
Patient informed of Dr. Jacobo's recommendation for the Venous Duplex to rule out DVT. Pt. Informed this nurse at the time of call that she is beginning to have pain behind her left knee and thigh. Will forward to Dr. Jacobo.

## 2021-01-22 NOTE — TELEPHONE ENCOUNTER
Pt. Is concerned b/c she is having some pain to her calf, and side of her Left leg. She denies redness or warmth to leg. She was concerned due to the history she has had with blood clots. She has already started her Warfarin. Please advise.

## 2021-02-01 ENCOUNTER — OFFICE VISIT (OUTPATIENT)
Dept: SURGERY | Facility: CLINIC | Age: 66
End: 2021-02-01

## 2021-02-01 DIAGNOSIS — K43.9 VENTRAL HERNIA WITHOUT OBSTRUCTION OR GANGRENE: Primary | ICD-10-CM

## 2021-02-01 PROCEDURE — 99024 POSTOP FOLLOW-UP VISIT: CPT | Performed by: SURGERY

## 2021-02-24 ENCOUNTER — OFFICE (OUTPATIENT)
Dept: URBAN - METROPOLITAN AREA CLINIC 66 | Facility: CLINIC | Age: 66
End: 2021-02-24

## 2021-02-24 VITALS
HEART RATE: 69 BPM | SYSTOLIC BLOOD PRESSURE: 105 MMHG | HEIGHT: 68 IN | WEIGHT: 171 LBS | DIASTOLIC BLOOD PRESSURE: 65 MMHG | TEMPERATURE: 97.9 F

## 2021-02-24 DIAGNOSIS — I85.00 ESOPHAGEAL VARICES WITHOUT BLEEDING: ICD-10-CM

## 2021-02-24 DIAGNOSIS — R93.3 ABNORMAL FINDINGS ON DIAGNOSTIC IMAGING OF OTHER PARTS OF DI: ICD-10-CM

## 2021-02-24 DIAGNOSIS — I81 PORTAL VEIN THROMBOSIS: ICD-10-CM

## 2021-02-24 PROCEDURE — 99212 OFFICE O/P EST SF 10 MIN: CPT | Performed by: INTERNAL MEDICINE

## 2022-02-23 ENCOUNTER — OFFICE (OUTPATIENT)
Dept: URBAN - METROPOLITAN AREA CLINIC 66 | Facility: CLINIC | Age: 67
End: 2022-02-23

## 2022-02-23 VITALS
DIASTOLIC BLOOD PRESSURE: 59 MMHG | SYSTOLIC BLOOD PRESSURE: 97 MMHG | HEART RATE: 75 BPM | HEIGHT: 68 IN | WEIGHT: 168 LBS

## 2022-02-23 DIAGNOSIS — R10.10 UPPER ABDOMINAL PAIN, UNSPECIFIED: ICD-10-CM

## 2022-02-23 PROBLEM — K29.70 GASTRITIS, UNSPECIFIED, WITHOUT BLEEDING: Status: ACTIVE | Noted: 2019-11-25

## 2022-02-23 PROCEDURE — 99214 OFFICE O/P EST MOD 30 MIN: CPT | Performed by: INTERNAL MEDICINE

## 2022-08-23 ENCOUNTER — OFFICE VISIT (OUTPATIENT)
Dept: ORTHOPEDIC SURGERY | Facility: CLINIC | Age: 67
End: 2022-08-23

## 2022-08-23 VITALS — TEMPERATURE: 97.9 F | HEIGHT: 68 IN | WEIGHT: 167.5 LBS | BODY MASS INDEX: 25.39 KG/M2

## 2022-08-23 DIAGNOSIS — M79.671 RIGHT FOOT PAIN: ICD-10-CM

## 2022-08-23 DIAGNOSIS — R52 PAIN: Primary | ICD-10-CM

## 2022-08-23 PROCEDURE — 99214 OFFICE O/P EST MOD 30 MIN: CPT | Performed by: ORTHOPAEDIC SURGERY

## 2022-08-23 PROCEDURE — 73562 X-RAY EXAM OF KNEE 3: CPT | Performed by: ORTHOPAEDIC SURGERY

## 2022-08-23 RX ORDER — DIPHENOXYLATE HYDROCHLORIDE AND ATROPINE SULFATE 2.5; .025 MG/1; MG/1
1 TABLET ORAL DAILY
COMMUNITY

## 2022-08-23 RX ORDER — ASCORBIC ACID 500 MG
1000 TABLET ORAL DAILY
COMMUNITY

## 2022-08-23 NOTE — PROGRESS NOTES
Patient: Anne Marie Mueller  YOB: 1955 67 y.o. female  Medical Record Number: [id number]    Chief Complaints:   Chief Complaint   Patient presents with   • Right Knee - Follow-up       History of Present Illness:Anne Marie Mueller is a 67 y.o. female who presents for follow-up of right knee pain.  She has medial stabbing aching as well as posterior pain with any flexion.  She has had previous injections of the knee which relatively short-lived.  She states that she cannot go for anything more than a very short walk she cannot perform basic activities of daily living due to the knee pain.  She has also done physical therapy without relief of symptoms.  She has a history of splenic vein thrombosis    Allergies:   Allergies   Allergen Reactions   • Contrast Dye Swelling     FACIAL   • Diclofenac Other (See Comments)     Bad heartburn causing pressure in the chest   • Aspartame Itching   • Morphine Headache   • Lamisil [Terbinafine] Swelling     JOINT PAIN, REDNESS   • Dexamethasone GI Intolerance   • Prednisone Rash and GI Intolerance       Medications:   Current Outpatient Medications   Medication Sig Dispense Refill   • acetaminophen (TYLENOL) 500 MG tablet Take 500 mg by mouth Every 6 (Six) Hours As Needed for Mild Pain .     • Calcium Carb-Cholecalciferol (CALCIUM/VITAMIN D PO) Take 1 tablet by mouth Daily. HOLD FOR SURGERY  1200MG/800IU     • cetirizine (zyrTEC) 10 MG tablet Take 10 mg by mouth At Night As Needed for Allergies.     • fluticasone (VERAMYST) 27.5 MCG/SPRAY nasal spray 2 sprays into the nostril(s) as directed by provider Daily As Needed.     • Magnesium 400 MG capsule Take 1 tablet by mouth Daily.     • nadolol (CORGARD) 20 MG tablet Take 30 mg by mouth Daily.     • warfarin (COUMADIN) 4 MG tablet Take 4 mg by mouth Daily.     • warfarin (COUMADIN) 5 MG tablet Take 5 mg by mouth Daily.     • Zinc 50 MG tablet Take 50 mg by mouth Daily.     • Apple Tavo Vn-Grn Tea-Bit Or-Cr (Apple  "Cider Vinegar Plus) tablet Take 1,200 mg by mouth 2 (two) times a day. HOLD FOR SURGERY     • ascorbic acid (VITAMIN C) 500 MG tablet Take 500 mg by mouth Daily.     • multivitamin (THERAGRAN) tablet tablet Take  by mouth.       No current facility-administered medications for this visit.         The following portions of the patient's history were reviewed and updated as appropriate: allergies, current medications, past family history, past medical history, past social history, past surgical history and problem list.    Review of Systems:   A 14 point review of systems was performed. All systems negative except pertinent positives/negative listed in HPI above    Physical Exam:   Vitals:    08/23/22 1030   Temp: 97.9 °F (36.6 °C)   TempSrc: Temporal   Weight: 76 kg (167 lb 8 oz)   Height: 172.7 cm (68\")   PainSc:   3   PainLoc: Knee       General: A and O x 3, ASA, NAD    SCLERA:    Normal    DENTITION:   Normal  Knee:  right    ALIGNMENT:     Varus  ,   Patella  tracks  midline    GAIT:    Antalgic    SKIN:    No abnormality    RANGE OF MOTION:   3  -  123   DEG    STRENGTH:   4  / 5    LIGAMENTS:    No varus / valgus instability.   Negative  Lachman.    MENISCUS:     Negative   Jessie       DISTAL PULSES:    Paplable    DISTAL SENSATION :   Intact    LYMPHATICS:     No   lymphadenopathy    OTHER:          - Positive   effusion      - Crepitance with ROM       Radiology:  Xrays 3views right knee (ap,lateral, sunrise) were ordered and reviewed for evaluation of knee pain demonstratingadvanced varus osteoarthritis with bone on bone articulation, subchondral cysts, and periarticular osteophytes  not available    Assessment/Plan:  Advanced right knee OA. Injections, PT activity modifications, cant take NSAID due to coumadin (splenic vein thrombosis).  She is concerned that she has persistent foot pain over what appears to be hallux valgus deformity.  She wants to get that looked into before considering the surgery.  " We will set her up to see Dr. Fairchild for consultation and if she would like to proceed with right knee replacement she can call back and we can get that arranged.  She does have a history of splenic vein thrombosis but I think no evidence

## 2022-09-01 ENCOUNTER — TRANSCRIBE ORDERS (OUTPATIENT)
Dept: ADMINISTRATIVE | Facility: HOSPITAL | Age: 67
End: 2022-09-01

## 2022-09-01 DIAGNOSIS — N13.30 HYDRONEPHROSIS, UNSPECIFIED HYDRONEPHROSIS TYPE: Primary | ICD-10-CM

## 2022-09-13 ENCOUNTER — HOSPITAL ENCOUNTER (OUTPATIENT)
Dept: NUCLEAR MEDICINE | Facility: HOSPITAL | Age: 67
Discharge: HOME | End: 2022-09-13

## 2022-09-13 DIAGNOSIS — N13.30 HYDRONEPHROSIS, UNSPECIFIED HYDRONEPHROSIS TYPE: ICD-10-CM

## 2022-09-13 PROCEDURE — 0 TECHNETIUM MERTIATIDE: Performed by: UROLOGY

## 2022-09-13 PROCEDURE — A9562 TC99M MERTIATIDE: HCPCS | Performed by: UROLOGY

## 2022-09-13 PROCEDURE — 25010000002 FUROSEMIDE PER 20 MG: Performed by: UROLOGY

## 2022-09-13 PROCEDURE — 78708 K FLOW/FUNCT IMAGE W/DRUG: CPT

## 2022-09-13 RX ORDER — FUROSEMIDE 10 MG/ML
38 INJECTION INTRAMUSCULAR; INTRAVENOUS
Status: DISCONTINUED | OUTPATIENT
Start: 2022-09-13 | End: 2022-09-13 | Stop reason: SDUPTHER

## 2022-09-13 RX ORDER — FUROSEMIDE 10 MG/ML
40 INJECTION INTRAMUSCULAR; INTRAVENOUS
Status: COMPLETED | OUTPATIENT
Start: 2022-09-13 | End: 2022-09-13

## 2022-09-13 RX ADMIN — FUROSEMIDE 40 MG: 10 INJECTION, SOLUTION INTRAMUSCULAR; INTRAVENOUS at 09:21

## 2022-09-13 RX ADMIN — TECHNESCAN TC 99M MERTIATIDE 1 DOSE: 1 INJECTION, POWDER, LYOPHILIZED, FOR SOLUTION INTRAVENOUS at 08:24

## 2022-09-29 ENCOUNTER — OFFICE VISIT (OUTPATIENT)
Dept: ORTHOPEDIC SURGERY | Facility: CLINIC | Age: 67
End: 2022-09-29

## 2022-09-29 VITALS — HEIGHT: 68 IN | WEIGHT: 163 LBS | BODY MASS INDEX: 24.71 KG/M2 | TEMPERATURE: 96.8 F

## 2022-09-29 DIAGNOSIS — R52 PAIN: ICD-10-CM

## 2022-09-29 DIAGNOSIS — M21.40 PES PLANUS, UNSPECIFIED LATERALITY: ICD-10-CM

## 2022-09-29 DIAGNOSIS — M19.072 ARTHRITIS OF FIRST METATARSOPHALANGEAL (MTP) JOINT OF LEFT FOOT: Primary | ICD-10-CM

## 2022-09-29 DIAGNOSIS — M20.12 HALLUX VALGUS OF LEFT FOOT: ICD-10-CM

## 2022-09-29 PROCEDURE — 73630 X-RAY EXAM OF FOOT: CPT | Performed by: ORTHOPAEDIC SURGERY

## 2022-09-29 PROCEDURE — 99214 OFFICE O/P EST MOD 30 MIN: CPT | Performed by: ORTHOPAEDIC SURGERY

## 2022-09-29 RX ORDER — ACETAMINOPHEN 160 MG
2000 TABLET,DISINTEGRATING ORAL DAILY
COMMUNITY

## 2022-09-29 RX ORDER — CEFAZOLIN SODIUM 2 G/100ML
2 INJECTION, SOLUTION INTRAVENOUS ONCE
Status: CANCELLED | OUTPATIENT
Start: 2022-10-25 | End: 2022-09-29

## 2022-09-29 NOTE — PROGRESS NOTES
New Patient Complaint      Patient: JEN SEVILLA  YOB: 1955 67 y.o. female  Medical Record Number: [id number]    Chief Complaints: Big toe hurts    History of Present Illness: Patient reports a history of chronic pain and swelling in her left great toe is worse with pushoff activity.    She also had a history of chronic pes planus with some intermittent discomfort in the inferolateral hindfoot but more so at the first MTP joint.      She is on Coumadin for history of a clotting disorder and has had splenic vein thrombosis.  She tells me she has come off of Coumadin previously for surgery and was not bridged.       HPI    Allergies:   Allergies   Allergen Reactions   • Contrast Dye Swelling     FACIAL   • Diclofenac Other (See Comments)     Bad heartburn causing pressure in the chest   • Aspartame Itching   • Morphine Headache   • Lamisil [Terbinafine] Swelling     JOINT PAIN, REDNESS   • Dexamethasone GI Intolerance   • Prednisone Rash and GI Intolerance       Medications:   Current Outpatient Medications on File Prior to Visit   Medication Sig   • acetaminophen (TYLENOL) 500 MG tablet Take 500 mg by mouth Every 6 (Six) Hours As Needed for Mild Pain .   • ascorbic acid (VITAMIN C) 500 MG tablet Take 500 mg by mouth Daily.   • Calcium Carb-Cholecalciferol (CALCIUM/VITAMIN D PO) Take 1 tablet by mouth Daily. HOLD FOR SURGERY  1200MG/800IU   • cetirizine (zyrTEC) 10 MG tablet Take 10 mg by mouth At Night As Needed for Allergies.   • Cholecalciferol (Vitamin D3) 50 MCG (2000 UT) capsule    • Magnesium 400 MG capsule Take 1 tablet by mouth Daily.   • multivitamin (THERAGRAN) tablet tablet Take  by mouth.   • nadolol (CORGARD) 20 MG tablet Take 30 mg by mouth Daily.   • warfarin (COUMADIN) 4 MG tablet Take 4 mg by mouth Daily.   • warfarin (COUMADIN) 5 MG tablet Take 5 mg by mouth Daily.   • Apple Tavo Vn-Grn Tea-Bit Or-Cr (Apple Cider Vinegar Plus) tablet Take 1,200 mg by mouth 2 (two) times a  day. HOLD FOR SURGERY   • fluticasone (VERAMYST) 27.5 MCG/SPRAY nasal spray 2 sprays into the nostril(s) as directed by provider Daily As Needed.   • Zinc 50 MG tablet Take 50 mg by mouth Daily.     No current facility-administered medications on file prior to visit.       Past Medical History:   Diagnosis Date   • Acute torn meniscus     Right   • Ankle sprain 1967    many times as a kid   • Arthritis     DEGENERATIVE HANDS AND FEET   • Arthritis of neck 2012    femoral neck in osteopenic range   • CTS (carpal tunnel syndrome) 2011    or degenerative arthritis   • Dry eyes    • Esophageal varices (HCC)    • Fracture, foot 2021    not a fracture but you provide no space for my feet problem of arthritus/bunions/constant pain/flat footed   • Hiatal hernia    • Hypertension    • Knee swelling 2020   • Legal blindness     LEFT EYE   • Low back strain 1980    not as bad now   • Mesenteric venous thrombosis (HCC)     PORTAL AND SPLENIC   • Pain     ABD WITH CERTAIN FOOD   • Pelvic pressure in female     WITH CERTAIN MOVEMENTS AND WHEN SITTING   • Portal hypertension (HCC)    • Splenomegaly    • Tear of meniscus of knee 2020    R knee osteoarthritis/torn Mesiscus 2 places   • Ureter, stricture     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 AND BEING FOLLOWED BY MICHELLE DESIR MD     Past Surgical History:   Procedure Laterality Date   • APPENDECTOMY N/A 1985   • COLONOSCOPY N/A 2019   • CYSTOSCOPY Bilateral 10/14/2020    Procedure: CYSTOSCOPY RETROGRADE PYELOGRAM;  Surgeon: Jaiden Desir MD;  Location: [facility];  Service: Urology;  Laterality: Bilateral;   • CYSTOSCOPY W/ URETERAL STENT REMOVAL     • ENDOSCOPY N/A 2019   • EXPLORATORY LAPAROTOMY N/A 2001   • EXPLORATORY LAPAROTOMY      ORIGINALLY LOOKING FOR OVARIAN CANCER, WAS NOT FOUND.  FOUND MESENTERIC/PORTAL/SPLENIC VENOUS THROMBOSIS   • FOOT SURGERY  2022    That's what I need to know from you as a podiatrist said I needed it   • HERNIA REPAIR N/A    • INGUINAL HERNIA REPAIR  "Left 01/20/2021    Procedure: open ventral hernia repair with mesh;  Surgeon: Yoanna Jacobo MD;  Location: [facility] MAIN OR;  Service: General;  Laterality: Left;   • LAPAROSCOPIC CHOLECYSTECTOMY N/A 2001   • TONSILLECTOMY     • URETERAL STENT INSERTION       Social History     Occupational History   • Not on file   Tobacco Use   • Smoking status: Never Smoker   • Smokeless tobacco: Never Used   Vaping Use   • Vaping Use: Never used   Substance and Sexual Activity   • Alcohol use: Never     Comment: RARELY   • Drug use: Never   • Sexual activity: Never      Social History     Social History Narrative   • Not on file     Family History   Problem Relation Age of Onset   • Breast cancer Sister    • Cancer Sister         lymphnodes   • Osteoporosis Mother    • Cancer Sister         pallete   • Malig Hyperthermia Neg Hx        Review of Systems: 14 point review of systems performed, positive pertinent findings identified in HPI. All remaining systems negative     Review of Systems      Physical Exam:   Vitals:    09/29/22 1322   Temp: 96.8 °F (36 °C)   Weight: 73.9 kg (163 lb)   Height: 172.7 cm (68\")   PainSc:   8     Physical Exam   Constitutional: pleasant, well developed   Eyes: sclera non icteric  Hearing : adequate for exam  Cardiovascular: palpable pulses in left foot, left calf/ thigh NT without sign of DVT  Respiratoy: breathing unlabored   Neurological: grossly sensate to LT throughout left LE  Psychiatric: oriented with normal mood and affect.   Lymphatic: No palpable popliteal lymphadenopathy left LE  Skin: intact throughout left leg/foot  Musculoskeletal: On exam she does have some planovalgus alignment of the left hindfoot with able to actively invert.  She has palpable arthritic change around the first MTP joint when pain with 25 degrees dorsiflexion 10 degrees plantarflexion.  Physical Exam  Ortho Exam    Radiology: 3 views of the left foot ordered evaluate pain reviewed and no prior x-rays available for " comparison there is significant arthritic change of the first metatarsal phalangeal joint with neutral alignment there is valgus through the proximal phalanx creating a valgus alignment to the toe.    Assessment/Plan: 1.  Left first MTP arthritis  2.  Left chronic pes planus    We discussed treatment going forward and reviewed with her that nonoperative measures could include heel cord stretching carbon fiber inserts and possible topical anti-inflammatories and radiographically guided steroid injections.  She request to proceed with operative treatment.  Reviewed her the most definitive treatment would be first metatarsophalangeal joint fusion with plate and screws and possible bone graft from her heel.    Reviewed her this would not relieve her ankle pain and be a much more involved work-up and potential surgical correction and decision made to proceed with treatment of the first toe only at this time.    She voiced clear understanding the operative procedure with associated risk benefits potential outcomes and complications which can include but are not limited to heart attack stroke death pneumonia infection bleeding damage to blood vessels nerves or tendons blood clots pulmonary embolism persistent or worsening pain stiffness malunion nonunion need for subsequent surgery and failure to return to presurgery and precondition levels of activity.  Small risk of fracture or infection at the bone graft harvest site if deemed necessary.    We discussed with her possible use of more custom orthotics down the road rather than the accommodative shoes that she had been using which do seem to be somewhat helpful.    She understands she will be nonweightbearing for at least 4 to 6 weeks and may not be full weightbearing for 8 to 10 weeks.    Patient was instructed to check with her PCP who manages her Coumadin to make sure she is okay to come off of this without bridging and will let us know if anything different needs to be  done..

## 2022-09-30 ENCOUNTER — TELEPHONE (OUTPATIENT)
Dept: ORTHOPEDIC SURGERY | Facility: CLINIC | Age: 67
End: 2022-09-30

## 2022-09-30 NOTE — TELEPHONE ENCOUNTER
I left a message with the PCPs office regarding her Coumadin.  Have advised him the patient is scheduled for surgery on October 25 with Dr. Fairchild and will need to be off of her Coumadin.  Of asked him to check with the PCP to see how many days before surgery she can stop her Coumadin and if the patient does need to be bridged with Lovenox.  Will check with the physician and then get back with me at the office

## 2022-09-30 NOTE — TELEPHONE ENCOUNTER
----- Message from Ulises Fairchild MD sent at 9/30/2022  1:59 PM EDT -----   can you please check with patient's PCP to make sure she is okay to come off of Coumadin before surgery and if she needs to be bridged or not.

## 2022-10-07 ENCOUNTER — TELEPHONE (OUTPATIENT)
Dept: ORTHOPEDIC SURGERY | Facility: CLINIC | Age: 67
End: 2022-10-07

## 2022-10-07 NOTE — TELEPHONE ENCOUNTER
Have spoken with the patient.  Have advised her that I have left a couple of messages with her PCPs office to see if the patient is okay to come off Coumadin and if she needs a Lovenox bridge.  Patient states that her primary care physician is fairly new to her.  In the past when she has had procedures done she is come off the Coumadin 3 to 5 days and did before surgery and did not require Lovenox bridge.  Have advised her that Dr. Fairchild would prefer her being off the Coumadin at least 5 days prior to surgery and will have her plan to take her last dose on October 19.  And will plan to resume the Coumadin postop day 1.  I will let her know once I have heard from Dr. Mcknight office if there are any other changes to the instructions regarding her Coumadin

## 2022-10-07 NOTE — TELEPHONE ENCOUNTER
Caller: JEN     Relationship to patient: SELF     Best call back number: [phone]    Patient is needing: PT CALLED IN CHECKING TO SEE IF DR ALEGRIA GOT IN TOUCH WITH DR HARPER OFFICE REGARDING SX 10.25.22 , PLEASE REVIEW AND ADVISE

## 2022-10-10 ENCOUNTER — TELEPHONE (OUTPATIENT)
Dept: ORTHOPEDIC SURGERY | Facility: CLINIC | Age: 67
End: 2022-10-10

## 2022-10-10 NOTE — TELEPHONE ENCOUNTER
Received a call from PCP.  He stated that patient should stoop Coumadin 5 days prior to surgery.  Patient does not require Lovenox bridge.  She is to restart Coumadin POD #1.  Patient has been notified

## 2022-10-17 ENCOUNTER — PRE-ADMISSION TESTING (OUTPATIENT)
Dept: PREADMISSION TESTING | Facility: HOSPITAL | Age: 67
End: 2022-10-17

## 2022-10-17 VITALS
OXYGEN SATURATION: 99 % | HEART RATE: 77 BPM | DIASTOLIC BLOOD PRESSURE: 67 MMHG | RESPIRATION RATE: 16 BRPM | TEMPERATURE: 96.8 F | SYSTOLIC BLOOD PRESSURE: 115 MMHG | BODY MASS INDEX: 25.16 KG/M2 | HEIGHT: 68 IN | WEIGHT: 166 LBS

## 2022-10-17 LAB
ANION GAP SERPL CALCULATED.3IONS-SCNC: 8.9 MMOL/L (ref 5–15)
BUN SERPL-MCNC: 21 MG/DL (ref 8–23)
BUN/CREAT SERPL: 22.3 (ref 7–25)
CALCIUM SPEC-SCNC: 9.6 MG/DL (ref 8.6–10.5)
CHLORIDE SERPL-SCNC: 106 MMOL/L (ref 98–107)
CO2 SERPL-SCNC: 26.1 MMOL/L (ref 22–29)
CREAT SERPL-MCNC: 0.94 MG/DL (ref 0.57–1)
DEPRECATED RDW RBC AUTO: 50.2 FL (ref 37–54)
EGFRCR SERPLBLD CKD-EPI 2021: 67.5 ML/MIN/1.73
ERYTHROCYTE [DISTWIDTH] IN BLOOD BY AUTOMATED COUNT: 15.2 % (ref 12.3–15.4)
GLUCOSE SERPL-MCNC: 94 MG/DL (ref 65–99)
HCT VFR BLD AUTO: 37.2 % (ref 34–46.6)
HGB BLD-MCNC: 12.3 G/DL (ref 12–15.9)
MCH RBC QN AUTO: 29.8 PG (ref 26.6–33)
MCHC RBC AUTO-ENTMCNC: 33.1 G/DL (ref 31.5–35.7)
MCV RBC AUTO: 90.1 FL (ref 79–97)
PLATELET # BLD AUTO: 348 10*3/MM3 (ref 140–450)
PMV BLD AUTO: 10.5 FL (ref 6–12)
POTASSIUM SERPL-SCNC: 4.7 MMOL/L (ref 3.5–5.2)
QT INTERVAL: 426 MS
RBC # BLD AUTO: 4.13 10*6/MM3 (ref 3.77–5.28)
SODIUM SERPL-SCNC: 141 MMOL/L (ref 136–145)
WBC NRBC COR # BLD: 6.87 10*3/MM3 (ref 3.4–10.8)

## 2022-10-17 PROCEDURE — 36415 COLL VENOUS BLD VENIPUNCTURE: CPT

## 2022-10-17 PROCEDURE — 80048 BASIC METABOLIC PNL TOTAL CA: CPT

## 2022-10-17 PROCEDURE — 85027 COMPLETE CBC AUTOMATED: CPT

## 2022-10-17 PROCEDURE — 93010 ELECTROCARDIOGRAM REPORT: CPT | Performed by: INTERNAL MEDICINE

## 2022-10-17 PROCEDURE — 93005 ELECTROCARDIOGRAM TRACING: CPT

## 2022-10-17 NOTE — DISCHARGE INSTRUCTIONS
Take the following medications the morning of surgery:  NADOLOL    ARRIVE 8:30 AM  10/25    If you are on prescription narcotic pain medication to control your pain you may also take that medication the morning of surgery.    General Instructions:  Do not eat solid food after midnight the night before surgery.  You may drink clear liquids day of surgery but must stop at least one hour before your hospital arrival time.  It is beneficial for you to have a clear drink that contains carbohydrates the day of surgery.  We suggest a 12 to 20 ounce bottle of Gatorade or Powerade for non-diabetic patients or a 12 to 20 ounce bottle of G2 or Powerade Zero for diabetic patients. (Pediatric patients, are not advised to drink a 12 to 20 ounce carbohydrate drink)    Clear liquids are liquids you can see through.  Nothing red in color.     Plain water                               Sports drinks  Sodas                                   Gelatin (Jell-O)  Fruit juices without pulp such as white grape juice and apple juice  Popsicles that contain no fruit or yogurt  Tea or coffee (no cream or milk added)  Gatorade / Powerade  G2 / Powerade Zero    Infants may have breast milk up to four hours before surgery.  Infants drinking formula may drink formula up to six hours before surgery.   Patients who avoid smoking, chewing tobacco and alcohol for 4 weeks prior to surgery have a reduced risk of post-operative complications.  Quit smoking as many days before surgery as you can.  Do not smoke, use chewing tobacco or drink alcohol the day of surgery.   If applicable bring your C-PAP/ BI-PAP machine.  Bring any papers given to you in the doctor’s office.  Wear clean comfortable clothes.  Do not wear contact lenses, false eyelashes or make-up.  Bring a case for your glasses.   Bring crutches or walker if applicable.  Remove all piercings.  Leave jewelry and any other valuables at home.  Hair extensions with metal clips must be removed prior  to surgery.  The Pre-Admission Testing nurse will instruct you to bring medications if unable to obtain an accurate list in Pre-Admission Testing.          Preventing a Surgical Site Infection:  For 2 to 3 days before surgery, avoid shaving with a razor because the razor can irritate skin and make it easier to develop an infection.    Any areas of open skin can increase the risk of a post-operative wound infection by allowing bacteria to enter and travel throughout the body.  Notify your surgeon if you have any skin wounds / rashes even if it is not near the expected surgical site.  The area will need assessed to determine if surgery should be delayed until it is healed.  The night prior to surgery shower using a fresh bar of anti-bacterial soap (such as Dial) and clean washcloth.  Sleep in a clean bed with clean clothing.  Do not allow pets to sleep with you.  Shower on the morning of surgery using a fresh bar of anti-bacterial soap (such as Dial) and clean washcloth.  Dry with a clean towel and dress in clean clothing.  Ask your surgeon if you will be receiving antibiotics prior to surgery.  Make sure you, your family, and all healthcare providers clean their hands with soap and water or an alcohol based hand  before caring for you or your wound.    Day of surgery:  Your arrival time is approximately two hours before your scheduled surgery time.  Upon arrival, a Pre-op nurse and Anesthesiologist will review your health history, obtain vital signs, and answer questions you may have.  The only belongings needed at this time will be a list of your home medications and if applicable your C-PAP/BI-PAP machine.  A Pre-op nurse will start an IV and you may receive medication in preparation for surgery, including something to help you relax.     Please be aware that surgery does come with discomfort.  We want to make every effort to control your discomfort so please discuss any uncontrolled symptoms with your  nurse.   Your doctor will most likely have prescribed pain medications.      If you are going home after surgery you will receive individualized written care instructions before being discharged.  A responsible adult must drive you to and from the hospital on the day of your surgery and stay with you for 24 hours.  Discharge prescriptions can be filled by the hospital pharmacy during regular pharmacy hours.  If you are having surgery late in the day/evening your prescription may be e-prescribed to your pharmacy.  Please verify your pharmacy hours or chose a 24 hour pharmacy to avoid not having access to your prescription because your pharmacy has closed for the day.    If you are staying overnight following surgery, you will be transported to your hospital room following the recovery period.  [facility] has all private rooms.    If you have any questions please call Pre-Admission Testing at [phone].  Deductibles and co-payments are collected on the day of service. Please be prepared to pay the required co-pay, deductible or deposit on the day of service as defined by your plan.    Call your surgeon immediately if you experience any of the following symptoms:  Sore Throat  Shortness of Breath or difficulty breathing  Cough  Chills  Body soreness or muscle pain  Headache  Fever  New loss of taste or smell  Do not arrive for your surgery ill.  Your procedure will need to be rescheduled to another time.  You will need to call your physician before the day of surgery to avoid any unnecessary exposure to hospital staff as well as other patients.

## 2022-10-21 ENCOUNTER — TELEPHONE (OUTPATIENT)
Dept: ORTHOPEDIC SURGERY | Facility: CLINIC | Age: 67
End: 2022-10-21

## 2022-10-21 NOTE — TELEPHONE ENCOUNTER
Caller: PATIENT     Relationship to patient: SELF     Best call back number: [phone]    Patient is needing: PATIENT WAS CALLING TO TALK TO THE CLINICAL STAFF REGARDING A FEW QUESTIONS SHE HAS REGARDING HER UPCOMING SURGERY ON 10.25.22. PATIENT STATED SHE WASN'T SURE WHAT SHE NEEDED TO WEAR TO HER SURGERY. PATIENT STATED SHE HAD A FEW MORE QUESTIONS BUT WOULD ASK WHEN SHE RECEIVES A CALL BACK. I ATTEMPTED TO WARM TRANSFER CALL TO THE CLINICAL LINE BUT RECEIVED NO ANSWER. THANK YOU!

## 2022-10-22 NOTE — H&P
Patient: JEN SEVILLA  YOB: 1955 67 y.o. female  Medical Record Number: [id number]     Chief Complaints: Big toe hurts     History of Present Illness: Patient reports a history of chronic pain and swelling in her left great toe is worse with pushoff activity.     She also had a history of chronic pes planus with some intermittent discomfort in the inferolateral hindfoot but more so at the first MTP joint.       She is on Coumadin for history of a clotting disorder and has had splenic vein thrombosis.  She tells me she has come off of Coumadin previously for surgery and was not bridged.        HPI     Allergies:         Allergies   Allergen Reactions   • Contrast Dye Swelling       FACIAL   • Diclofenac Other (See Comments)       Bad heartburn causing pressure in the chest   • Aspartame Itching   • Morphine Headache   • Lamisil [Terbinafine] Swelling       JOINT PAIN, REDNESS   • Dexamethasone GI Intolerance   • Prednisone Rash and GI Intolerance         Medications:        Current Outpatient Medications on File Prior to Visit   Medication Sig   • acetaminophen (TYLENOL) 500 MG tablet Take 500 mg by mouth Every 6 (Six) Hours As Needed for Mild Pain .   • ascorbic acid (VITAMIN C) 500 MG tablet Take 500 mg by mouth Daily.   • Calcium Carb-Cholecalciferol (CALCIUM/VITAMIN D PO) Take 1 tablet by mouth Daily. HOLD FOR SURGERY  1200MG/800IU   • cetirizine (zyrTEC) 10 MG tablet Take 10 mg by mouth At Night As Needed for Allergies.   • Cholecalciferol (Vitamin D3) 50 MCG (2000 UT) capsule     • Magnesium 400 MG capsule Take 1 tablet by mouth Daily.   • multivitamin (THERAGRAN) tablet tablet Take  by mouth.   • nadolol (CORGARD) 20 MG tablet Take 30 mg by mouth Daily.   • warfarin (COUMADIN) 4 MG tablet Take 4 mg by mouth Daily.   • warfarin (COUMADIN) 5 MG tablet Take 5 mg by mouth Daily.   • Apple Tavo Vn-Grn Tea-Bit Or-Cr (Apple Cider Vinegar Plus) tablet Take 1,200 mg by mouth 2 (two) times a day.  HOLD FOR SURGERY   • fluticasone (VERAMYST) 27.5 MCG/SPRAY nasal spray 2 sprays into the nostril(s) as directed by provider Daily As Needed.   • Zinc 50 MG tablet Take 50 mg by mouth Daily.      No current facility-administered medications on file prior to visit.         Medical History        Past Medical History:   Diagnosis Date   • Acute torn meniscus       Right   • Ankle sprain 1967     many times as a kid   • Arthritis       DEGENERATIVE HANDS AND FEET   • Arthritis of neck 2012     femoral neck in osteopenic range   • CTS (carpal tunnel syndrome) 2011     or degenerative arthritis   • Dry eyes     • Esophageal varices (HCC)     • Fracture, foot 2021     not a fracture but you provide no space for my feet problem of arthritus/bunions/constant pain/flat footed   • Hiatal hernia     • Hypertension     • Knee swelling 2020   • Legal blindness       LEFT EYE   • Low back strain 1980     not as bad now   • Mesenteric venous thrombosis (HCC)       PORTAL AND SPLENIC   • Pain       ABD WITH CERTAIN FOOD   • Pelvic pressure in female       WITH CERTAIN MOVEMENTS AND WHEN SITTING   • Portal hypertension (HCC)     • Splenomegaly     • Tear of meniscus of knee 2020     R knee osteoarthritis/torn Mesiscus 2 places   • Ureter, stricture       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 AND BEING FOLLOWED BY MICHELLE DESIR MD         Surgical History         Past Surgical History:   Procedure Laterality Date   • APPENDECTOMY N/A 1985   • COLONOSCOPY N/A 2019   • CYSTOSCOPY Bilateral 10/14/2020     Procedure: CYSTOSCOPY RETROGRADE PYELOGRAM;  Surgeon: Jaiden Desir MD;  Location: [facility];  Service: Urology;  Laterality: Bilateral;   • CYSTOSCOPY W/ URETERAL STENT REMOVAL       • ENDOSCOPY N/A 2019   • EXPLORATORY LAPAROTOMY N/A 2001   • EXPLORATORY LAPAROTOMY         ORIGINALLY LOOKING FOR OVARIAN CANCER, WAS NOT FOUND.  FOUND MESENTERIC/PORTAL/SPLENIC VENOUS THROMBOSIS   • FOOT SURGERY   2022     That's what I need to know from you  "as a podiatrist said I needed it   • HERNIA REPAIR N/A     • INGUINAL HERNIA REPAIR Left 01/20/2021     Procedure: open ventral hernia repair with mesh;  Surgeon: Yoanna Jacobo MD;  Location: [facility] OR;  Service: General;  Laterality: Left;   • LAPAROSCOPIC CHOLECYSTECTOMY N/A 2001   • TONSILLECTOMY       • URETERAL STENT INSERTION             Social History            Occupational History   • Not on file   Tobacco Use   • Smoking status: Never Smoker   • Smokeless tobacco: Never Used   Vaping Use   • Vaping Use: Never used   Substance and Sexual Activity   • Alcohol use: Never       Comment: RARELY   • Drug use: Never   • Sexual activity: Never      Social History          Social History Narrative   • Not on file            Family History   Problem Relation Age of Onset   • Breast cancer Sister     • Cancer Sister           lymphnodes   • Osteoporosis Mother     • Cancer Sister           pallete   • Malig Hyperthermia Neg Hx           Review of Systems: 14 point review of systems performed, positive pertinent findings identified in HPI. All remaining systems negative      Review of Systems        Physical Exam:   Vitals       Vitals:     09/29/22 1322   Temp: 96.8 °F (36 °C)   Weight: 73.9 kg (163 lb)   Height: 172.7 cm (68\")   PainSc:   8      Performed 9/29/2022  Physical Exam   Constitutional: pleasant, well developed   Eyes: sclera non icteric  Hearing : adequate for exam  Cardiovascular: palpable pulses in left foot, left calf/ thigh NT without sign of DVT  Respiratoy: breathing unlabored   Neurological: grossly sensate to LT throughout left LE  Psychiatric: oriented with normal mood and affect.   Lymphatic: No palpable popliteal lymphadenopathy left LE  Skin: intact throughout left leg/foot  Musculoskeletal: On exam she does have some planovalgus alignment of the left hindfoot with able to actively invert.  She has palpable arthritic change around the first MTP joint when pain with 25 degrees " dorsiflexion 10 degrees plantarflexion.  Physical Exam  Ortho Exam     Radiology: 3 views of the left foot ordered evaluate pain reviewed and no prior x-rays available for comparison there is significant arthritic change of the first metatarsal phalangeal joint with neutral alignment there is valgus through the proximal phalanx creating a valgus alignment to the toe.     Assessment/Plan: 1.  Left first MTP arthritis  2.  Left chronic pes planus     We discussed treatment going forward and reviewed with her that nonoperative measures could include heel cord stretching carbon fiber inserts and possible topical anti-inflammatories and radiographically guided steroid injections.  She request to proceed with operative treatment.  Reviewed her the most definitive treatment would be first metatarsophalangeal joint fusion with plate and screws and possible bone graft from her heel.     Reviewed her this would not relieve her ankle pain and be a much more involved work-up and potential surgical correction and decision made to proceed with treatment of the first toe only at this time.     She voiced clear understanding the operative procedure with associated risk benefits potential outcomes and complications which can include but are not limited to heart attack stroke death pneumonia infection bleeding damage to blood vessels nerves or tendons blood clots pulmonary embolism persistent or worsening pain stiffness malunion nonunion need for subsequent surgery and failure to return to presurgery and precondition levels of activity.  Small risk of fracture or infection at the bone graft harvest site if deemed necessary.     We discussed with her possible use of more custom orthotics down the road rather than the accommodative shoes that she had been using which do seem to be somewhat helpful.     She understands she will be nonweightbearing for at least 4 to 6 weeks and may not be full weightbearing for 8 to 10 weeks.     She is  on Coumadin and we have been in contact with her PCPs office and was notified that she did not need to bridge when coming off Coumadin and had been instructed on coming off of this 5 days prior to surgery and to resume on postoperative day #1.

## 2022-10-25 ENCOUNTER — APPOINTMENT (OUTPATIENT)
Dept: GENERAL RADIOLOGY | Facility: HOSPITAL | Age: 67
End: 2022-10-25

## 2022-10-25 ENCOUNTER — ANESTHESIA (OUTPATIENT)
Dept: PERIOP | Facility: HOSPITAL | Age: 67
End: 2022-10-25

## 2022-10-25 ENCOUNTER — ANESTHESIA EVENT (OUTPATIENT)
Dept: PERIOP | Facility: HOSPITAL | Age: 67
End: 2022-10-25

## 2022-10-25 ENCOUNTER — HOSPITAL ENCOUNTER (OUTPATIENT)
Facility: HOSPITAL | Age: 67
Setting detail: HOSPITAL OUTPATIENT SURGERY
Discharge: HOME | End: 2022-10-25
Attending: ORTHOPAEDIC SURGERY | Admitting: ORTHOPAEDIC SURGERY

## 2022-10-25 VITALS
SYSTOLIC BLOOD PRESSURE: 109 MMHG | DIASTOLIC BLOOD PRESSURE: 64 MMHG | TEMPERATURE: 97.5 F | RESPIRATION RATE: 16 BRPM | OXYGEN SATURATION: 96 % | HEART RATE: 55 BPM

## 2022-10-25 DIAGNOSIS — M19.072 ARTHRITIS OF FIRST METATARSOPHALANGEAL (MTP) JOINT OF LEFT FOOT: ICD-10-CM

## 2022-10-25 DIAGNOSIS — M20.12 HALLUX VALGUS OF LEFT FOOT: ICD-10-CM

## 2022-10-25 LAB
INR PPP: 1.34 (ref 0.9–1.1)
PROTHROMBIN TIME: 16.8 SECONDS (ref 11.7–14.2)

## 2022-10-25 PROCEDURE — 25010000002 CEFAZOLIN IN DEXTROSE 2-4 GM/100ML-% SOLUTION: Performed by: ORTHOPAEDIC SURGERY

## 2022-10-25 PROCEDURE — 85610 PROTHROMBIN TIME: CPT | Performed by: ORTHOPAEDIC SURGERY

## 2022-10-25 PROCEDURE — 25010000002 ROPIVACAINE PER 1 MG: Performed by: ANESTHESIOLOGY

## 2022-10-25 PROCEDURE — C1713 ANCHOR/SCREW BN/BN,TIS/BN: HCPCS | Performed by: ORTHOPAEDIC SURGERY

## 2022-10-25 PROCEDURE — 25010000002 MIDAZOLAM PER 1 MG: Performed by: ANESTHESIOLOGY

## 2022-10-25 PROCEDURE — 76000 FLUOROSCOPY <1 HR PHYS/QHP: CPT

## 2022-10-25 PROCEDURE — 25010000002 FENTANYL CITRATE (PF) 50 MCG/ML SOLUTION: Performed by: ANESTHESIOLOGY

## 2022-10-25 PROCEDURE — 25010000002 FENTANYL CITRATE (PF) 100 MCG/2ML SOLUTION: Performed by: NURSE ANESTHETIST, CERTIFIED REGISTERED

## 2022-10-25 PROCEDURE — 25010000002 NEOSTIGMINE 5 MG/10ML SOLUTION: Performed by: NURSE ANESTHETIST, CERTIFIED REGISTERED

## 2022-10-25 PROCEDURE — 28750 FUSION OF BIG TOE JOINT: CPT | Performed by: ORTHOPAEDIC SURGERY

## 2022-10-25 PROCEDURE — 25010000002 ONDANSETRON PER 1 MG: Performed by: NURSE ANESTHETIST, CERTIFIED REGISTERED

## 2022-10-25 PROCEDURE — C1769 GUIDE WIRE: HCPCS | Performed by: ORTHOPAEDIC SURGERY

## 2022-10-25 PROCEDURE — 76942 ECHO GUIDE FOR BIOPSY: CPT | Performed by: ORTHOPAEDIC SURGERY

## 2022-10-25 PROCEDURE — 73630 X-RAY EXAM OF FOOT: CPT

## 2022-10-25 PROCEDURE — 25010000002 PROPOFOL 10 MG/ML EMULSION: Performed by: NURSE ANESTHETIST, CERTIFIED REGISTERED

## 2022-10-25 DEVICE — IMPLANTABLE DEVICE: Type: IMPLANTABLE DEVICE | Site: FOOT | Status: FUNCTIONAL

## 2022-10-25 DEVICE — SCRW COMPR NOHD FUL/THRD TI MICRO 2.5X24MM: Type: IMPLANTABLE DEVICE | Site: FOOT | Status: FUNCTIONAL

## 2022-10-25 DEVICE — SCRW CORT FT/ANKL L/P TI 3X16MM: Type: IMPLANTABLE DEVICE | Site: FOOT | Status: FUNCTIONAL

## 2022-10-25 DEVICE — SCRW COMPR KREULOCK VA LK FUL/THRD TI 3X20MM: Type: IMPLANTABLE DEVICE | Site: FOOT | Status: FUNCTIONAL

## 2022-10-25 DEVICE — SCRW COMPR KREULOCK VA LK FUL/THRD TI 3X16MM: Type: IMPLANTABLE DEVICE | Site: FOOT | Status: FUNCTIONAL

## 2022-10-25 DEVICE — SCRW COMPR KREULOCK VA LK FUL/THRD TI 3X10MM: Type: IMPLANTABLE DEVICE | Site: FOOT | Status: FUNCTIONAL

## 2022-10-25 RX ORDER — NEOSTIGMINE METHYLSULFATE 0.5 MG/ML
INJECTION, SOLUTION INTRAVENOUS AS NEEDED
Status: DISCONTINUED | OUTPATIENT
Start: 2022-10-25 | End: 2022-10-25 | Stop reason: SURG

## 2022-10-25 RX ORDER — ROPIVACAINE HYDROCHLORIDE 5 MG/ML
INJECTION, SOLUTION EPIDURAL; INFILTRATION; PERINEURAL
Status: COMPLETED | OUTPATIENT
Start: 2022-10-25 | End: 2022-10-25

## 2022-10-25 RX ORDER — EPHEDRINE SULFATE 50 MG/ML
INJECTION INTRAVENOUS AS NEEDED
Status: DISCONTINUED | OUTPATIENT
Start: 2022-10-25 | End: 2022-10-25 | Stop reason: SURG

## 2022-10-25 RX ORDER — SODIUM CHLORIDE, SODIUM LACTATE, POTASSIUM CHLORIDE, CALCIUM CHLORIDE 600; 310; 30; 20 MG/100ML; MG/100ML; MG/100ML; MG/100ML
9 INJECTION, SOLUTION INTRAVENOUS CONTINUOUS PRN
Status: DISCONTINUED | OUTPATIENT
Start: 2022-10-25 | End: 2022-10-25 | Stop reason: HOSPADM

## 2022-10-25 RX ORDER — SODIUM CHLORIDE 0.9 % (FLUSH) 0.9 %
3 SYRINGE (ML) INJECTION EVERY 12 HOURS SCHEDULED
Status: DISCONTINUED | OUTPATIENT
Start: 2022-10-25 | End: 2022-10-25 | Stop reason: HOSPADM

## 2022-10-25 RX ORDER — ROCURONIUM BROMIDE 10 MG/ML
INJECTION, SOLUTION INTRAVENOUS AS NEEDED
Status: DISCONTINUED | OUTPATIENT
Start: 2022-10-25 | End: 2022-10-25 | Stop reason: SURG

## 2022-10-25 RX ORDER — GLYCOPYRROLATE 0.2 MG/ML
INJECTION INTRAMUSCULAR; INTRAVENOUS AS NEEDED
Status: DISCONTINUED | OUTPATIENT
Start: 2022-10-25 | End: 2022-10-25 | Stop reason: SURG

## 2022-10-25 RX ORDER — PROPOFOL 10 MG/ML
VIAL (ML) INTRAVENOUS AS NEEDED
Status: DISCONTINUED | OUTPATIENT
Start: 2022-10-25 | End: 2022-10-25 | Stop reason: SURG

## 2022-10-25 RX ORDER — OXYCODONE HYDROCHLORIDE AND ACETAMINOPHEN 5; 325 MG/1; MG/1
1-2 TABLET ORAL EVERY 4 HOURS PRN
Qty: 50 TABLET | Refills: 0 | Status: SHIPPED | OUTPATIENT
Start: 2022-10-25 | End: 2022-11-02

## 2022-10-25 RX ORDER — SODIUM CHLORIDE 0.9 % (FLUSH) 0.9 %
3-10 SYRINGE (ML) INJECTION AS NEEDED
Status: DISCONTINUED | OUTPATIENT
Start: 2022-10-25 | End: 2022-10-25 | Stop reason: HOSPADM

## 2022-10-25 RX ORDER — LIDOCAINE HYDROCHLORIDE 20 MG/ML
INJECTION, SOLUTION INFILTRATION; PERINEURAL AS NEEDED
Status: DISCONTINUED | OUTPATIENT
Start: 2022-10-25 | End: 2022-10-25 | Stop reason: SURG

## 2022-10-25 RX ORDER — CEFAZOLIN SODIUM 2 G/100ML
2 INJECTION, SOLUTION INTRAVENOUS ONCE
Status: COMPLETED | OUTPATIENT
Start: 2022-10-25 | End: 2022-10-25

## 2022-10-25 RX ORDER — MIDAZOLAM HYDROCHLORIDE 1 MG/ML
0.5 INJECTION INTRAMUSCULAR; INTRAVENOUS
Status: DISCONTINUED | OUTPATIENT
Start: 2022-10-25 | End: 2022-10-25 | Stop reason: HOSPADM

## 2022-10-25 RX ORDER — ONDANSETRON 2 MG/ML
INJECTION INTRAMUSCULAR; INTRAVENOUS AS NEEDED
Status: DISCONTINUED | OUTPATIENT
Start: 2022-10-25 | End: 2022-10-25 | Stop reason: SURG

## 2022-10-25 RX ORDER — FAMOTIDINE 10 MG/ML
20 INJECTION, SOLUTION INTRAVENOUS ONCE
Status: COMPLETED | OUTPATIENT
Start: 2022-10-25 | End: 2022-10-25

## 2022-10-25 RX ORDER — CEPHALEXIN 500 MG/1
500 CAPSULE ORAL EVERY 6 HOURS
Qty: 8 CAPSULE | Refills: 0 | Status: SHIPPED | OUTPATIENT
Start: 2022-10-25 | End: 2022-10-27

## 2022-10-25 RX ORDER — FENTANYL CITRATE 50 UG/ML
50 INJECTION, SOLUTION INTRAMUSCULAR; INTRAVENOUS
Status: DISCONTINUED | OUTPATIENT
Start: 2022-10-25 | End: 2022-10-25 | Stop reason: HOSPADM

## 2022-10-25 RX ORDER — FENTANYL CITRATE 50 UG/ML
INJECTION, SOLUTION INTRAMUSCULAR; INTRAVENOUS AS NEEDED
Status: DISCONTINUED | OUTPATIENT
Start: 2022-10-25 | End: 2022-10-25 | Stop reason: SURG

## 2022-10-25 RX ORDER — MIDAZOLAM HYDROCHLORIDE 1 MG/ML
1 INJECTION INTRAMUSCULAR; INTRAVENOUS
Status: DISCONTINUED | OUTPATIENT
Start: 2022-10-25 | End: 2022-10-25 | Stop reason: HOSPADM

## 2022-10-25 RX ADMIN — ROPIVACAINE HYDROCHLORIDE 30 ML: 5 INJECTION EPIDURAL; INFILTRATION; PERINEURAL at 10:38

## 2022-10-25 RX ADMIN — EPHEDRINE SULFATE 10 MG: 50 INJECTION INTRAVENOUS at 13:05

## 2022-10-25 RX ADMIN — GLYCOPYRROLATE 0.4 MCG: 1 INJECTION INTRAMUSCULAR; INTRAVENOUS at 15:08

## 2022-10-25 RX ADMIN — GLYCOPYRROLATE 0.2 MCG: 1 INJECTION INTRAMUSCULAR; INTRAVENOUS at 13:28

## 2022-10-25 RX ADMIN — MIDAZOLAM 1 MG: 1 INJECTION, SOLUTION INTRAMUSCULAR; INTRAVENOUS at 10:31

## 2022-10-25 RX ADMIN — PROPOFOL 200 MG: 10 INJECTION, EMULSION INTRAVENOUS at 12:54

## 2022-10-25 RX ADMIN — ROPIVACAINE HYDROCHLORIDE 30 ML: 5 INJECTION, SOLUTION EPIDURAL; INFILTRATION; PERINEURAL at 10:43

## 2022-10-25 RX ADMIN — EPHEDRINE SULFATE 10 MG: 50 INJECTION INTRAVENOUS at 13:26

## 2022-10-25 RX ADMIN — FENTANYL CITRATE 50 MCG: 50 INJECTION, SOLUTION INTRAMUSCULAR; INTRAVENOUS at 12:54

## 2022-10-25 RX ADMIN — SODIUM CHLORIDE, POTASSIUM CHLORIDE, SODIUM LACTATE AND CALCIUM CHLORIDE 9 ML/HR: 600; 310; 30; 20 INJECTION, SOLUTION INTRAVENOUS at 09:47

## 2022-10-25 RX ADMIN — FAMOTIDINE 20 MG: 10 INJECTION INTRAVENOUS at 10:06

## 2022-10-25 RX ADMIN — ROCURONIUM BROMIDE 30 MG: 50 INJECTION INTRAVENOUS at 12:54

## 2022-10-25 RX ADMIN — FENTANYL CITRATE 50 MCG: 50 INJECTION INTRAMUSCULAR; INTRAVENOUS at 10:31

## 2022-10-25 RX ADMIN — NEOSTIGMINE METHYLSULFATE 3 MG: 0.5 INJECTION INTRAVENOUS at 15:08

## 2022-10-25 RX ADMIN — LIDOCAINE HYDROCHLORIDE 100 MG: 20 INJECTION, SOLUTION INFILTRATION; PERINEURAL at 12:54

## 2022-10-25 RX ADMIN — CEFAZOLIN SODIUM 2 G: 2 INJECTION, SOLUTION INTRAVENOUS at 12:42

## 2022-10-25 RX ADMIN — ONDANSETRON 4 MG: 2 INJECTION INTRAMUSCULAR; INTRAVENOUS at 12:58

## 2022-10-25 RX ADMIN — SODIUM CHLORIDE, POTASSIUM CHLORIDE, SODIUM LACTATE AND CALCIUM CHLORIDE: 600; 310; 30; 20 INJECTION, SOLUTION INTRAVENOUS at 15:06

## 2022-10-25 NOTE — ANESTHESIA PREPROCEDURE EVALUATION
Anesthesia Evaluation     no history of anesthetic complications:  NPO Solid Status: > 8 hours  NPO Liquid Status: > 2 hours           Airway   Mallampati: II  Neck ROM: full  no difficulty expected  Dental - normal exam     Pulmonary - negative pulmonary ROS and normal exam   (-) COPD, asthma, sleep apnea, not a smoker    PE comment: nonlabored  Cardiovascular - normal exam  Exercise tolerance: good (4-7 METS)    Rhythm: regular  Rate: normal    (+) hypertension, DVT,   (-) valvular problems/murmurs, past MI, CAD, dysrhythmias, angina      Neuro/Psych- negative ROS  (-) seizures, TIA, CVA  GI/Hepatic/Renal/Endo    (+)  hiatal hernia,    (-) GERD, liver disease, no renal disease, diabetes, no thyroid disorder    ROS Comment: Esophageal varices;  Splenomegaly    Musculoskeletal     (+) arthralgias,   Abdominal    Substance History      OB/GYN          Other   arthritis, blood dyscrasia (on coumadin--held for surgery),       Other Comment: Mesenteric, splenic, & portal vein thrombosis--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        CODE STATUS:

## 2022-10-25 NOTE — ANESTHESIA PROCEDURE NOTES
Peripheral Block      Patient reassessed immediately prior to procedure    Patient location during procedure: holding area  Start time: 10/25/2022 10:39 AM  Stop time: 10/25/2022 10:43 AM  Reason for block: at surgeon's request and post-op pain management  Performed by  Anesthesiologist: Hipolito Mancuso MD  Preanesthetic Checklist  Completed: patient identified, IV checked, site marked, risks and benefits discussed, surgical consent, monitors and equipment checked, pre-op evaluation and timeout performed  Prep:  Pt Position: supine  Sterile barriers:cap, gloves, mask, washed/disinfected hands and alcohol skin prep  Prep: ChloraPrep  Patient monitoring: blood pressure monitoring, continuous pulse oximetry and EKG  Procedure    Sedation: yes    Guidance:ultrasound guided    ULTRASOUND INTERPRETATION.  Using ultrasound guidance a 21 G gauge needle was placed in close proximity to the femoral nerve, at which point, under ultrasound guidance anesthetic was injected in the area of the nerve and spread of the anesthesia was seen on ultrasound in close proximity thereto.  There were no abnormalities seen on ultrasound; a digital image was taken; and the patient tolerated the procedure with no complications. Images:still images obtained, printed/placed on chart    Laterality:left  Block Type:adductor canal block (Femoral Nerve at Adductor Canal)  Injection Technique:single-shot  Needle Type:short-bevel  Needle Gauge:21 G  Loss of resistance: normal.    Medications Used: ropivacaine (NAROPIN) 0.5 % injection - Injection   30 mL - 10/25/2022 10:43:00 AM      Medications  Comment:Ultrasound Interpretation:  Ultrasound guidance utilized for visualization of needle approach to femoral artery/nerve at adductor canal level and verification of local anesthetic disbursement to surrounding tissue. Photo printed and placed on chart for reference.    Post Assessment  Injection Assessment: negative aspiration for heme, no  paresthesia on injection and incremental injection  Patient Tolerance:comfortable throughout block  Complications:no

## 2022-10-25 NOTE — INTERVAL H&P NOTE
H&P updated. The patient was examined and still mainly has complaints of pain around the left first MTP joint she does report that she has had some pain out of the lesser metatarsals and reviewed her exam and reviewed with her that I did not feel that there was anything I can do from a surgical standpoint for those and that hopefully with correcting the first MTP joint and fusing it that she would have less avoidant gait of the first MTP joint and may reduce some pain to the lesser metatarsals but could not guarantee that.  She voiced clear understanding of this and was in agreement with proceeding with operative treatment as previously outlined.  She has come off of her Coumadin and her INR is an appropriate level and she will resume her Coumadin tomorrow as per instructions per PCP.  He has a scooter for postoperative mobilization.

## 2022-10-25 NOTE — ANESTHESIA POSTPROCEDURE EVALUATION
Patient: JEN SEVILLA    Procedure Summary     Date: 10/25/22 Room / Location:  [facility] [address] [place] [address] [facility]    Anesthesia Start: 1246 Anesthesia Stop: 1538    Procedure: Left first metatarsal phalangeal joint fusion (Left: Foot) Diagnosis:       Arthritis of first metatarsophalangeal (MTP) joint of left foot      Hallux valgus of left foot      (Arthritis of first metatarsophalangeal (MTP) joint of left foot [M19.072])      (Hallux valgus of left foot [M20.12])    Surgeons: Ulises Fairchild MD Provider: Diogo Ferrara MD    Anesthesia Type: general ASA Status: 3          Anesthesia Type: general    Vitals  Vitals Value Taken Time   /65 10/25/22 1540   Temp     Pulse 64 10/25/22 1542   Resp     SpO2 98 % 10/25/22 1542   Vitals shown include unvalidated device data.        Post Anesthesia Care and Evaluation    Patient location during evaluation: bedside  Patient participation: complete - patient participated  Level of consciousness: sleepy but conscious  Pain management: adequate    Airway patency: patent  Anesthetic complications: No anesthetic complications    Cardiovascular status: acceptable  Respiratory status: acceptable  Hydration status: acceptable    Comments: */63 (BP Location: Left arm, Patient Position: Lying)   Pulse 56   Temp 36.6 °C (97.9 °F) (Oral)   Resp 14   SpO2 98%

## 2022-10-25 NOTE — BRIEF OP NOTE
FOOT FUSION  Progress Note    JEN SEVILLA  10/25/2022    Pre-op Diagnosis:   Arthritis of first metatarsophalangeal (MTP) joint of left foot [M19.072]  Hallux valgus of left foot [M20.12]       Post-Op Diagnosis Codes:     * Arthritis of first metatarsophalangeal (MTP) joint of left foot [M19.072]     * Hallux valgus of left foot [M20.12]    Procedure/CPT® Codes:        Procedure(s):  Left first metatarsal phalangeal joint fusion        Surgeon(s):  Ulises Fairchild MD    Anesthesia: General with Block    Staff:   Circulator: Garrett Quiles RN; Tara Swenson RN  Radiology Technologist: Livia Nava  Scrub Person: Arely Figueredo  Vendor Representative: Kings Shahid         Estimated Blood Loss: 15 ml    Urine Voided: * No values recorded between 10/25/2022 12:47 PM and 10/25/2022  3:26 PM *    Specimens:                None          Drains: * No LDAs found *    Findings: see dict    TT 92 min        Complications: none          Ulises Fairchild MD     Date: 10/25/2022  Time: 15:35 EDT

## 2022-10-25 NOTE — ANESTHESIA PROCEDURE NOTES
Peripheral Block      Patient reassessed immediately prior to procedure    Patient location during procedure: pre-op  Start time: 10/25/2022 10:33 AM  Stop time: 10/25/2022 10:38 AM  Reason for block: at surgeon's request and post-op pain management  Performed by  Anesthesiologist: Hipolito Mancuso MD  Preanesthetic Checklist  Completed: patient identified, IV checked, site marked, risks and benefits discussed, surgical consent, monitors and equipment checked, pre-op evaluation and timeout performed  Prep:  Pt Position: right lateral decubitus  Sterile barriers:cap, gloves, mask, washed/disinfected hands and alcohol skin prep  Prep: ChloraPrep  Patient monitoring: blood pressure monitoring, continuous pulse oximetry and EKG  Procedure    Sedation: yes    Guidance:ultrasound guided    ULTRASOUND INTERPRETATION.  Using ultrasound guidance a 21 G gauge needle was placed in close proximity to the nerve, at which point, under ultrasound guidance anesthetic was injected in the area of the nerve and spread of the anesthesia was seen on ultrasound in close proximity thereto.  There were no abnormalities seen on ultrasound; a digital image was taken; and the patient tolerated the procedure with no complications. Images:still images obtained, printed/placed on chart    Laterality:left  Block Type:popliteal (Popliteal/Sciatic Nerve)  Injection Technique:single-shot  Needle Type:short-bevel  Needle Gauge:21 G  Loss of resistance: normal.    Medications Used: ropivacaine (NAROPIN) 0.5 % injection - Injection   30 mL - 10/25/2022 10:38:00 AM      Medications  Comment:Ultrasound Interpretation:  Ultrasound guidance utilized for visualization of needle approach to popliteal nerve and verification of local anesthetic disbursement to surrounding tissue. Photo printed and placed on chart for reference.    Post Assessment  Injection Assessment: negative aspiration for heme, no paresthesia on injection and incremental  injection  Patient Tolerance:comfortable throughout block  Complications:no

## 2022-10-26 NOTE — OP NOTE
Operative Note      Facility: [facility]  Patient Name: JEN SEVILLA  YOB: 1955  Date: 10/25/22  Medical Record Number: [id number]      Pre-op Diagnosis: 1.  left first metatarsal phalangeal joint arthritis   2.  left hallux valgus    Post-op Diagnosis:1.  left first metatarsal phalangeal joint arthritis   2.  left hallux valgus       Procedure(s):  Left first metatarsal phalangeal joint fusion using Arthrex fuse force plate    Surgeon(s):  Ulises Fairchild MD    Anesthesia: General with Block  Anesthesiologist: Diogo Ferrara MD; Hipolito Mancuso MD  CRNA: Zoe Martinez CRNA    Staff:   Circulator: Garrett Quiles RN; Tara Swenson RN  Radiology Technologist: Livia Nava  Scrub Person: Arely Figueredo  Vendor Representative: Kings Shahid  Assistants : none      Tourniquet time: 92 minutes        Estimated Blood Loss:  15 ml  Drains: None  Specimens:   Order Name Source Comment Collection Info Order Time   PROTIME-INR   Collected By: Ena Morris RN 10/25/2022  9:15 AM     Findings: See Dictation    Complications: None      Indications for Procedure: Patient has had a history of pain and deformity of her left first MTP joint and we discussed operative and nonoperative treatment options and although no guarantees could be given mutual decision made to proceed with operative treatment.  She voiced clear understanding the operative procedure and associated risk benefits potential outcomes and complications.          Description of Procedure: Preoperative informed consent and anesthesia evaluation were obtained.  IV antibiotics were administered and the surgical site was marked.  Patient was brought to the operating room placed in the supine position.  Anesthesia was induced and LMA was positioned.  Well-padded tourniquet was placed left proximal thigh.    Left foot and leg were prepped and draped in a sterile fashion and surgical timeout performed.   The foot was inspected and the MTP joint identified clinically and radiographically.  There was significant bony prominence dorsally medially and laterally.  Incision was created centered over the first metatarsal phalangeal joint and extended proximally and distally.  Full-thickness flaps were carefully elevated with care taken to mobilize and protect subcutaneous veins and nerves were possible.  EHL tendon was identified and kept within its sheath.  Capsulotomies were more medial to this and capsule was reflected medially and laterally.  There was extensive amount of synovitis that was debrided sharply.    She had large amount of eburnated bone and ossification over the dorsal medial and lateral aspect that was removed with a rongeur.  The joint was inspected and found to have severe eburnated cartilage within.  Due to the alignment I felt that a silver bunionectomy was needed and the medial eminence was resected with a sagittal saw and the head was gently contoured.  Also used a saw to remove residual osteophytes over the dorsal aspect of the metatarsal head and this was contoured laterally with a rongeur.  Osteophytes were removed from the dorsum of the first proximal phalanx and used a sagittal saw to resect a small portion of the medial base of her to better affect alignment.  The toe was flexed and osteophytes were removed plantarly as well.    Following removal of all the impinging osteophytes and a guidewire for the Arthrex set was passed down the central axis of the first metatarsal.  Hemispherical reamer was then used to prepare the metatarsal head down to softer cancellous bone there was some mild cystic change dorsomedially but this did not affect the construct.  Guidewire and reamer was also used at the base of the proximal phalanx to create a nice hemispherical surface.    Once these surfaces were prepared the joint was thoroughly irrigated and the joint was heavily fenestrated with a 2 mm drill  creating multiple holes of soft cancellous bone for the fusion site.  These were left in place and sprayed with PRP and I did not feel it needed to be augmented with bone graft from the heel.    The toe was then carefully positioned and was able to bring her out of the valgus and good opposition was obtained at the fusion surfaces.  The toe was carefully positioned in neutral rotation and in no more than 10 degrees of valgus such that there was appropriate spacing between the first and second toes.  Flexion was aligned with 20 to 25 degrees of dorsiflexion of the first proximal phalanx relative to the first metatarsal.  This was cross pinned and checked with a flat plate and the pulp of the toe was about 3 mm off the plate and had good contact with about 40 degrees of flexion and I felt this was appropriately aligned and the toe was crossed and.    I then used the Arthrex fuse force plate with 5 degrees of valgus and 5 degrees of dorsiflexion built and centered this over the joint and secured provisionally.  Once appropriately positioned was obtained this was then secured with locking screws proximally.  The jig for the compressive device was placed proximally and drilled.  The plate was then gently compressed after removing the provisional fixation proximally this was then secured with a bicortical screw.  Alignment was good in all planes and again checked flexion and alignment seemed appropriate.  Additional locking screws were placed proximally.  Alignment appears good on x-rays and clinically with good apposition of the fusion surfaces.  I then placed an Arthrex 2.5 mm headless variable pitch screw from dorsal medially to plantar laterally for additional fixation and was well contained.    Wound was then thoroughly irrigated and tourniquet was released.  Hemostasis was obtained.    The capsule was closed as much as possible over the plate which had good coverage and EHL tendon was intact.  Skin was closed with  3-0 Vicryl and 3-0 nylon.  Sterile dressing was applied she was placed into a sterile forefoot and ankle bunion dressing.  She was awakened transferred to stretcher and taken recovery in stable condition

## 2022-11-02 ENCOUNTER — OFFICE VISIT (OUTPATIENT)
Dept: ORTHOPEDIC SURGERY | Facility: CLINIC | Age: 67
End: 2022-11-02

## 2022-11-02 VITALS — TEMPERATURE: 96.9 F | WEIGHT: 166 LBS | BODY MASS INDEX: 25.16 KG/M2 | HEIGHT: 68 IN

## 2022-11-02 DIAGNOSIS — M20.12 HALLUX VALGUS OF LEFT FOOT: ICD-10-CM

## 2022-11-02 DIAGNOSIS — M19.072 ARTHRITIS OF FIRST METATARSOPHALANGEAL (MTP) JOINT OF LEFT FOOT: ICD-10-CM

## 2022-11-02 DIAGNOSIS — R52 PAIN: Primary | ICD-10-CM

## 2022-11-02 DIAGNOSIS — M21.40 PES PLANUS, UNSPECIFIED LATERALITY: ICD-10-CM

## 2022-11-02 PROCEDURE — 29540 STRAPPING ANKLE &/FOOT: CPT | Performed by: ORTHOPAEDIC SURGERY

## 2022-11-02 PROCEDURE — 73630 X-RAY EXAM OF FOOT: CPT | Performed by: ORTHOPAEDIC SURGERY

## 2022-11-02 PROCEDURE — 99024 POSTOP FOLLOW-UP VISIT: CPT | Performed by: ORTHOPAEDIC SURGERY
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
## 2022-11-17 ENCOUNTER — OFFICE VISIT (OUTPATIENT)
Dept: ORTHOPEDIC SURGERY | Facility: CLINIC | Age: 67
End: 2022-11-17

## 2022-11-17 VITALS — BODY MASS INDEX: 25.16 KG/M2 | HEIGHT: 68 IN | TEMPERATURE: 97.3 F | WEIGHT: 166 LBS

## 2022-11-17 DIAGNOSIS — M20.12 HALLUX VALGUS OF LEFT FOOT: Primary | ICD-10-CM

## 2022-11-17 DIAGNOSIS — M19.072 ARTHRITIS OF FIRST METATARSOPHALANGEAL (MTP) JOINT OF LEFT FOOT: ICD-10-CM

## 2022-11-17 PROCEDURE — 73630 X-RAY EXAM OF FOOT: CPT | Performed by: ORTHOPAEDIC SURGERY

## 2022-11-17 PROCEDURE — 99024 POSTOP FOLLOW-UP VISIT: CPT | Performed by: ORTHOPAEDIC SURGERY

## 2022-11-17 PROCEDURE — 29540 STRAPPING ANKLE &/FOOT: CPT | Performed by: ORTHOPAEDIC SURGERY
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
## 2022-12-01 ENCOUNTER — TELEPHONE (OUTPATIENT)
Dept: ORTHOPEDIC SURGERY | Facility: CLINIC | Age: 67
End: 2022-12-01

## 2022-12-01 NOTE — TELEPHONE ENCOUNTER
Provider: TOYA  Caller: JEN  Relationship to Patient: DINORA  Pharmacy:   Phone Number: [phone]  Reason for Call: PT WANTS TO SCHEDULE HER GERONIMO SHOT IN RIGHT KNEE. ATTEMPTED TO WT.

## 2022-12-05 ENCOUNTER — OFFICE VISIT (OUTPATIENT)
Dept: ORTHOPEDIC SURGERY | Facility: CLINIC | Age: 67
End: 2022-12-05

## 2022-12-05 VITALS — WEIGHT: 166 LBS | HEIGHT: 68 IN | BODY MASS INDEX: 25.16 KG/M2 | TEMPERATURE: 97.1 F

## 2022-12-05 DIAGNOSIS — M20.12 HALLUX VALGUS OF LEFT FOOT: ICD-10-CM

## 2022-12-05 DIAGNOSIS — M19.072 ARTHRITIS OF FIRST METATARSOPHALANGEAL (MTP) JOINT OF LEFT FOOT: ICD-10-CM

## 2022-12-05 DIAGNOSIS — R52 PAIN: Primary | ICD-10-CM

## 2022-12-05 PROCEDURE — 99024 POSTOP FOLLOW-UP VISIT: CPT | Performed by: ORTHOPAEDIC SURGERY

## 2022-12-05 PROCEDURE — 73630 X-RAY EXAM OF FOOT: CPT | Performed by: ORTHOPAEDIC SURGERY

## 2022-12-05 NOTE — PROGRESS NOTES
"Foot Follow Up      Patient: Blanka Mueller    YOB: 1955 67 y.o. female    Chief Complaints: Foot doing okay    History of Present Illness:patient follows up left first MTP fusion without calcaneal bone graft from 10/25/2022.       She was seen on 11/2/2022 and had been nonweightbearing and elevating and did fall off of her scooter hitting her foot that she thinks likely hit her arch.  She reported numbness and mild aching pain but was off pain medications only using Tylenol.  Pain i was rated at 1 out of 10.     She has a history of some chronic pes planus with intermittent discomfort in the inferolateral hindfoot.  She did come off her Coumadin for her surgery but has resumed it.     Her sutures removed and Steri-Strips were applied and she was placed into a sterile dressing with forefoot and ankle bunion spica instructed to continue with elevation and nonweightbearing.     She was seen on 11/17/2022 stating that she was not having any pain but did fall off her scooter twice.  She had also been using her left heel to open a trash can in the kitchen.  She had no complaints of pain in the left great toe.    She seemed to be doing well at that point and was placed back into a forefoot and ankle bunion spica dressing and instructed continue nonweightbearing for another 10 days and then to start some partial foot flat weightbearing with her forefoot off  shoe and walker as she was anxious to \"start bearing weight\".  Counseled her to take things easily as she was \"ready to get going\".    She is seen back today stating she has been using her walker out of the house for the last 10 days but really does not go out much and has been using her forefoot off  shoe and still using her walker some in the house.  Her wrapping came off and she tried to rewrap it herself.  She does not have her walker with her today.  She reports some intermittent swelling in her left great toe and compression hose " "help.  Only time she has pain is when she pushes on her sheets with her toe.  Current pain is rated 0 out of 10       HPI    ROS: Foot pain  Past Medical History:   Diagnosis Date   • Acute torn meniscus     Right   • Ankle sprain 1967    many times as a kid   • Arthritis     DEGENERATIVE HANDS AND FEET   • Arthritis of back    • Arthritis of neck 2012    femoral neck in osteopenic range   • CTS (carpal tunnel syndrome) 2011    or degenerative arthritis   • Difficulty urinating    • Dry eyes    • Eczema    • Esophageal varices (HCC)    • Fracture, foot 2021    not a fracture but you provide no space for my feet problem of arthritus/bunions/constant pain/flat footed   • Gastritis    • Great toe pain    • Hiatal hernia    • History of dizziness    • Hypertension    • Knee swelling 2020   • Legal blindness     LEFT EYE   • Low back strain 1980    not as bad now   • Mesenteric venous thrombosis (HCC)     PORTAL AND SPLENIC   • Pain     ABD WITH CERTAIN FOOD   • Pelvic pressure in female     WITH CERTAIN MOVEMENTS AND WHEN SITTING   • Portal hypertension (HCC)    • Splenomegaly    • Tear of meniscus of knee 2020    R knee osteoarthritis/torn Mesiscus 2 places   • Ureter, stricture     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 AND BEING FOLLOWED BY MICHELLE ALMEIDA MD     Physical Exam:   Vitals:    12/05/22 1102   Temp: 97.1 °F (36.2 °C)   Weight: 75.3 kg (166 lb)   Height: 172.7 cm (68\")   PainSc: 0-No pain     Well developed with normal mood.  On exam there is mild swelling to her left great toe but incision appears to be healing without sign of infection.  There was really no focal discomfort today along the first MTP joint with grossly intact sensation to light touch.      Radiology: 3 views of the left foot ordered evaluate postoperative alignment reviewed and compared to previous x-rays.  There appears to be good alignment across her first MTP joint fusion and hardware appears intact.      Assessment/Plan:    1.  Status post left first " MTP arthrodesis  2.  Chronic left pes planus    We discussed treatment going forward and recommend she continue with her walker and we will have her start progressing to using a regular postop shoe rather than her forefoot off  shoe.    She will continue with compression hose may let this get wet in the shower but not to immerse it.    We will see her back in 2 weeks with x-rays of her left foot.

## 2022-12-19 ENCOUNTER — HOSPITAL ENCOUNTER (OUTPATIENT)
Dept: CARDIOLOGY | Facility: HOSPITAL | Age: 67
Discharge: HOME | End: 2022-12-19
Admitting: ORTHOPAEDIC SURGERY

## 2022-12-19 ENCOUNTER — OFFICE VISIT (OUTPATIENT)
Dept: ORTHOPEDIC SURGERY | Facility: CLINIC | Age: 67
End: 2022-12-19

## 2022-12-19 ENCOUNTER — TELEPHONE (OUTPATIENT)
Dept: ORTHOPEDIC SURGERY | Facility: CLINIC | Age: 67
End: 2022-12-19

## 2022-12-19 VITALS — BODY MASS INDEX: 25.16 KG/M2 | TEMPERATURE: 97.6 F | WEIGHT: 166 LBS | HEIGHT: 68 IN

## 2022-12-19 DIAGNOSIS — M19.072 ARTHRITIS OF FIRST METATARSOPHALANGEAL (MTP) JOINT OF LEFT FOOT: ICD-10-CM

## 2022-12-19 DIAGNOSIS — M79.662 PAIN OF LEFT CALF: Primary | ICD-10-CM

## 2022-12-19 DIAGNOSIS — M20.12 HALLUX VALGUS OF LEFT FOOT: ICD-10-CM

## 2022-12-19 DIAGNOSIS — R52 PAIN: ICD-10-CM

## 2022-12-19 DIAGNOSIS — M79.662 PAIN OF LEFT CALF: ICD-10-CM

## 2022-12-19 LAB
BH CV LOW VAS LEFT GASTRONEMIUS VESSEL: 1
BH CV LOW VAS LEFT GREAT SAPH AK CM FIELD: 2.24 CM
BH CV LOW VAS LEFT GREATER SAPH AK VESSEL: 1
BH CV LOW VAS LEFT GREATER SAPH BK VESSEL: 1
BH CV LOW VAS LEFT PERONEAL VESSEL: 1
BH CV LOWER VASCULAR LEFT COMMON FEMORAL AUGMENT: NORMAL
BH CV LOWER VASCULAR LEFT COMMON FEMORAL COMPETENT: NORMAL
BH CV LOWER VASCULAR LEFT COMMON FEMORAL COMPRESS: NORMAL
BH CV LOWER VASCULAR LEFT COMMON FEMORAL PHASIC: NORMAL
BH CV LOWER VASCULAR LEFT COMMON FEMORAL SPONT: NORMAL
BH CV LOWER VASCULAR LEFT DISTAL FEMORAL COMPRESS: NORMAL
BH CV LOWER VASCULAR LEFT GASTRONEMIUS COMPRESS: NORMAL
BH CV LOWER VASCULAR LEFT GASTRONEMIUS THROMBUS: NORMAL
BH CV LOWER VASCULAR LEFT GREATER SAPH AK COMPRESS: NORMAL
BH CV LOWER VASCULAR LEFT GREATER SAPH AK THROMBUS: NORMAL
BH CV LOWER VASCULAR LEFT GREATER SAPH BK COMPRESS: NORMAL
BH CV LOWER VASCULAR LEFT GREATER SAPH BK THROMBUS: NORMAL
BH CV LOWER VASCULAR LEFT LESSER SAPH COMPRESS: NORMAL
BH CV LOWER VASCULAR LEFT MID FEMORAL AUGMENT: NORMAL
BH CV LOWER VASCULAR LEFT MID FEMORAL COMPETENT: NORMAL
BH CV LOWER VASCULAR LEFT MID FEMORAL COMPRESS: NORMAL
BH CV LOWER VASCULAR LEFT MID FEMORAL PHASIC: NORMAL
BH CV LOWER VASCULAR LEFT MID FEMORAL SPONT: NORMAL
BH CV LOWER VASCULAR LEFT PERONEAL COMPRESS: NORMAL
BH CV LOWER VASCULAR LEFT PERONEAL THROMBUS: NORMAL
BH CV LOWER VASCULAR LEFT POPLITEAL AUGMENT: NORMAL
BH CV LOWER VASCULAR LEFT POPLITEAL COMPETENT: NORMAL
BH CV LOWER VASCULAR LEFT POPLITEAL COMPRESS: NORMAL
BH CV LOWER VASCULAR LEFT POPLITEAL PHASIC: NORMAL
BH CV LOWER VASCULAR LEFT POPLITEAL SPONT: NORMAL
BH CV LOWER VASCULAR LEFT POSTERIOR TIBIAL COMPRESS: NORMAL
BH CV LOWER VASCULAR LEFT PROFUNDA FEMORAL COMPRESS: NORMAL
BH CV LOWER VASCULAR LEFT PROXIMAL FEMORAL COMPRESS: NORMAL
BH CV LOWER VASCULAR LEFT SAPHENOFEMORAL JUNCTION COMPRESS: NORMAL
BH CV LOWER VASCULAR RIGHT COMMON FEMORAL AUGMENT: NORMAL
BH CV LOWER VASCULAR RIGHT COMMON FEMORAL COMPETENT: NORMAL
BH CV LOWER VASCULAR RIGHT COMMON FEMORAL COMPRESS: NORMAL
BH CV LOWER VASCULAR RIGHT COMMON FEMORAL PHASIC: NORMAL
BH CV LOWER VASCULAR RIGHT COMMON FEMORAL SPONT: NORMAL
MAXIMAL PREDICTED HEART RATE: 155 BPM
STRESS TARGET HR: 132 BPM

## 2022-12-19 PROCEDURE — 99024 POSTOP FOLLOW-UP VISIT: CPT | Performed by: ORTHOPAEDIC SURGERY

## 2022-12-19 PROCEDURE — 73630 X-RAY EXAM OF FOOT: CPT | Performed by: ORTHOPAEDIC SURGERY

## 2022-12-19 PROCEDURE — 93971 EXTREMITY STUDY: CPT

## 2022-12-19 NOTE — PROGRESS NOTES
Patient sent for a left lower extremity venous duplex. Scan completed and preliminary results of acute and chronic calf vein DVT and chronic SVT called to Dr. Ulises Fairchild. Advised patient that Ying from his office will reach out to her later. She is free to leave.

## 2022-12-19 NOTE — PROGRESS NOTES
"Foot Follow Up      Patient: Blanka Mueller    YOB: 1955 67 y.o. female    Chief Complaints: Foot doing okay but swells    History of Present Illness:patient follows up left first MTP fusion without calcaneal bone graft from 10/25/2022.       She was seen on 11/2/2022 and had been nonweightbearing and elevating and did fall off of her scooter hitting her foot that she thinks likely hit her arch.  She reported numbness and mild aching pain but was off pain medications only using Tylenol.  Pain  was rated at 1 out of 10.     She has a history of some chronic pes planus with intermittent discomfort in the inferolateral hindfoot.  She did come off her Coumadin for her surgery but had resumed it.     Her sutures were removed and Steri-Strips were applied and she was placed into a sterile dressing with forefoot and ankle bunion spica instructed to continue with elevation and nonweightbearing.     She was seen on 11/17/2022 stating that she was not having any pain but did fall off her scooter twice.  She had also been using her left heel to open a trash can in the kitchen.  She had no complaints of pain in the left great toe.     She seemed to be doing well at that point and was placed back into a forefoot and ankle bunion spica dressing and instructed continue nonweightbearing for another 10 days and then to start some partial foot flat weightbearing with her forefoot off  shoe and walker as she was anxious to \"start bearing weight\".  Counseled her to take things easily as she was \"ready to get going\".     She was seen on 12/5/2022 stating she had been using her walker out of the house for the previous 10 days but really did not go out much and had been using her forefoot off  shoe and still using her walker some in the house.  Her wrapping had come off and she tried to rewrap it herself.  She did not have her walker with her that day.  She reported some intermittent swelling in her left " great toe and compression hose were helping with that.  She reported the only time she had pain was when she pushed on her sheets with her toe.  Current pain was rated 0 out of 10.    We discussed treatment going forward and recommended that she continue with use of a walker and start progressing with weightbearing using a regular postop shoe rather than the forefoot offloading continue with compression hose.    She is seen back today stating that she has been using her walker in the house but not really using it out of the house but using a scooter some.  She reports swelling in her left leg and foot and is on Coumadin and using compression hose.  She does have a history of clotting disorder with previous splenic vein thrombosis.  She does report that she has had some intermittent pain in her calf.     HPI    ROS: Foot pain  Past Medical History:   Diagnosis Date   • Acute torn meniscus     Right   • Ankle sprain 1967    many times as a kid   • Arthritis     DEGENERATIVE HANDS AND FEET   • Arthritis of back    • Arthritis of neck 2012    femoral neck in osteopenic range   • CTS (carpal tunnel syndrome) 2011    or degenerative arthritis   • Difficulty urinating    • Dry eyes    • Eczema    • Esophageal varices (HCC)    • Fracture, foot 2021    not a fracture but you provide no space for my feet problem of arthritus/bunions/constant pain/flat footed   • Gastritis    • Great toe pain    • Hiatal hernia    • History of dizziness    • Hypertension    • Knee swelling 2020   • Legal blindness     LEFT EYE   • Low back strain 1980    not as bad now   • Mesenteric venous thrombosis (HCC)     PORTAL AND SPLENIC   • Pain     ABD WITH CERTAIN FOOD   • Pelvic pressure in female     WITH CERTAIN MOVEMENTS AND WHEN SITTING   • Portal hypertension (HCC)    • Splenomegaly    • Tear of meniscus of knee 2020    R knee osteoarthritis/torn Mesiscus 2 places   • Ureter, stricture     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 AND BEING FOLLOWED BY MICHELLE  "JUAN PABLO SHANNON     Physical Exam:   Vitals:    12/19/22 1100   Temp: 97.6 °F (36.4 °C)   TempSrc: Temporal   Weight: 75.3 kg (166 lb)   Height: 172.7 cm (67.99\")     Well developed with normal mood.  On exam there is mild to moderate swelling of her left foot and leg with some tenderness along the calf.  Incision was healed without sign of infection and really no focal discomfort at the first MTP joint with grossly intact sensation to light touch in the first toe          Radiology:3 views of the left foot ordered evaluate postoperative alignment reviewed and compared to previous x-rays.  Remains good alignment to her fusion at the first MTP joint hardware is well contained.  Fusion appears to be healing.      Assessment/Plan:   1.  Status post left first MTP arthrodesis  2.  Chronic left pes planus    Her fusion seems be doing well but I am certainly concerned about her swelling.  She will continue with her compression hose and we will send her for a Doppler to see if there is any new calf pain thrombosis (she reports that she had her INR checked recently through her PCP and it was therapeutic at 2.4.)    We will have her continue with postoperative shoe and walker for now and begin some therapy to work on gait training    She may do some heel cord stretching exercises well and I will see her back in 3 to 4 weeks with x-rays of her left foot.  Answers for HPI/ROS submitted by the patient on 12/12/2022  Please describe your symptoms.: Post Op  Have you had these symptoms before?: No  How long have you been having these symptoms?: 1-4 days  What is the primary reason for your visit?: Other      "

## 2022-12-19 NOTE — TELEPHONE ENCOUNTER
Received a call back from the PCPs office.  Office states that Dr. Mcknight is recommending that the patient continue with her present dose of Coumadin.  Patient has been notified.  Patient states that she gets her INR done once a month and the INR of 2.4 was about 2 weeks ago.  Have asked her to make an appointment to see her PCP this week to get her INR checked to make sure her INR remains therapeutic.  Patient denies any shortness of breath however she is complaining of some pain in her left side that she has developed over the last few days although she does have some gastrointestinal issues.  Have asked her to discuss this with her PCP when she goes in for her repeat INR this week.  Patient does understand that if her symptoms do get worse that she needs to go to the emergency room

## 2022-12-19 NOTE — TELEPHONE ENCOUNTER
Patient was seen earlier in the office today by Dr. Fairchild.  She was complaining of some calf tenderness and swelling.  She was sent for repeat Doppler which did show acute and chronic DVT in the left lower extremity.  She has chronic DVT in the gastroc but an acute DVT in the peroneal vein.  Patient does have a history of DVT and has been on Coumadin INR today was 2.4.  Have left a message with the PCPs office for further recommendations and instructions

## 2023-01-09 ENCOUNTER — OFFICE VISIT (OUTPATIENT)
Dept: ORTHOPEDIC SURGERY | Facility: CLINIC | Age: 68
End: 2023-01-09
Payer: MEDICARE

## 2023-01-09 VITALS — BODY MASS INDEX: 25.11 KG/M2 | WEIGHT: 165.7 LBS | TEMPERATURE: 98 F | HEIGHT: 68 IN

## 2023-01-09 DIAGNOSIS — M20.12 HALLUX VALGUS OF LEFT FOOT: Primary | ICD-10-CM

## 2023-01-09 DIAGNOSIS — M19.072 ARTHRITIS OF FIRST METATARSOPHALANGEAL (MTP) JOINT OF LEFT FOOT: ICD-10-CM

## 2023-01-09 DIAGNOSIS — I82.452 ACUTE DEEP VEIN THROMBOSIS (DVT) OF LEFT PERONEAL VEIN: ICD-10-CM

## 2023-01-09 DIAGNOSIS — G57.92 NEURITIS OF LEFT FOOT: ICD-10-CM

## 2023-01-09 PROCEDURE — 73630 X-RAY EXAM OF FOOT: CPT | Performed by: ORTHOPAEDIC SURGERY

## 2023-01-09 PROCEDURE — 99024 POSTOP FOLLOW-UP VISIT: CPT | Performed by: ORTHOPAEDIC SURGERY

## 2023-01-09 RX ORDER — LEVOTHYROXINE SODIUM 0.05 MG/1
50 TABLET ORAL DAILY
COMMUNITY
Start: 2022-12-08

## 2023-01-09 NOTE — PROGRESS NOTES
Foot Follow Up      Patient: Blanka Mueller    YOB: 1955 67 y.o. female    Chief Complaints: Foot getting better    History of Present Illness:patient follows up left first MTP fusion without calcaneal bone graft from 10/25/2022.       She was seen on 11/2/2022 and had been nonweightbearing and elevating and did fall off of her scooter hitting her foot that she thinks likely hit her arch.  She reported numbness and mild aching pain but was off pain medications only using Tylenol.  Pain  was rated at 1 out of 10.     She has a history of some chronic pes planus with intermittent discomfort in the inferolateral hindfoot.  She did come off her Coumadin for her surgery but had resumed it.     Her sutures were removed and Steri-Strips were applied and she was placed into a sterile dressing with forefoot and ankle bunion spica instructed to continue with elevation and nonweightbearing.     She was seen on 11/17/2022 stating that she was not having any pain but did fall off her scooter twice.  She had also been using her left heel to open a trash can in the kitchen.  She had no complaints of pain in the left great toe.     She seemed to be doing well at that point and was placed back into a forefoot and ankle bunion spica dressing and instructed continue nonweightbearing for another 10 days and then to start some partial foot flat weightbearing with her forefoot off  shoe and walker as she was anxious to \"start bearing weight\".  Counseled her to take things easily as she was \"ready to get going\".     She was seen on 12/5/2022 stating she had been using her walker out of the house for the previous 10 days but really did not go out much and had been using her forefoot off  shoe and still using her walker some in the house.  Her wrapping had come off and she tried to rewrap it herself.  She did not have her walker with her that day.  She reported some intermittent swelling in her left great  toe and compression hose were helping with that.  She reported the only time she had pain was when she pushed on her sheets with her toe.  Current pain was rated 0 out of 10.     We discussed treatment going forward and recommended that she continue with use of a walker and start progressing with weightbearing using a regular postop shoe rather than the forefoot offloading continue with compression hose.     She was seen on 12/19/2022 stating that she had been using her walker in the house but not really using it out of the house but using a scooter some.  She reported swelling in her left leg and foot and was on Coumadin and using compression hose.  She did have a history of clotting disorder with previous splenic vein thrombosis.  She did report that she has had some intermittent pain in her calf.    Given the concern for swelling she was instructed continue with compression hose and sent for Doppler.  She was instructed to continue with her postoperative shoe and walker and to begin some therapy working on gait training.    Her Doppler did show an acute thrombosis in one of her peroneal veins and chronic thrombosis in her left gastroc vein.  My assistant Ying spoke with her PCP and patient has subsequently spoke with her PCP in her was referred to hematology and to a vascular specialist.    The hematologist she does not want to see is there downtown and requested referral to Dr. Johnston.  She is seeing Dr. Eloy Steele a vascular specialist with [facility] in about a week or so.    She reports that her swelling has diminished but does feel some tingling in her foot and ankle but is less when she uses her compression hose.  Of her own volition she stopped using her walker and crutches about 2 weeks ago and came out of her postoperative shoe using a zip up sturdy soled type of boot.  Tennis shoes tend to rub some on the top of her foot.    She really does not have appreciable pain on the first MTP joint but initially  had some discomfort in the ball of her foot which is now improving.  Current pain is rated at 1 out of 10    ROS: Foot pain  Past Medical History:   Diagnosis Date   • Acute torn meniscus     Right   • Ankle sprain 1967    many times as a kid   • Arthritis     DEGENERATIVE HANDS AND FEET   • Arthritis of back    • Arthritis of neck 2012    femoral neck in osteopenic range   • CTS (carpal tunnel syndrome) 2011    or degenerative arthritis   • Difficulty urinating    • Dry eyes    • Eczema    • Esophageal varices (HCC)    • Fracture, foot 2021    not a fracture but you provide no space for my feet problem of arthritus/bunions/constant pain/flat footed   • Gastritis    • Great toe pain    • Hiatal hernia    • History of dizziness    • Hypertension    • Knee swelling 2020   • Legal blindness     LEFT EYE   • Low back strain 1980    not as bad now   • Mesenteric venous thrombosis (HCC)     PORTAL AND SPLENIC   • Pain     ABD WITH CERTAIN FOOD   • Pelvic pressure in female     WITH CERTAIN MOVEMENTS AND WHEN SITTING   • Portal hypertension (HCC)    • Splenomegaly    • Tear of meniscus of knee 2020    R knee osteoarthritis/torn Mesiscus 2 places   • Ureter, stricture     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 AND BEING FOLLOWED BY MICHELLE ALMEIDA MD     Physical Exam:   Vitals:    01/09/23 1111   Temp: 98 °F (36.7 °C)   Weight: 75.2 kg (165 lb 11.2 oz)   Height: 172.7 cm (68\")   PainSc:   1     Well developed with normal mood.  She is in zip up fairly sturdy boots today.  Left foot shows diminished swelling.  Her left first MTP incision is well-healed and there was neutral alignment of the first toe without focal tenderness around the first MTP joint.  She has at least neutral dorsiflexion of the heel cord.  She was grossly sensate light touch in the left foot with no mottling or hyperesthesia.      Radiology: 3 views of the left foot ordered evaluate postoperative alignment reviewed and compared to previous x-rays.  There is good alignment  of the first MTP fusion which appears to be healing and is in good alignment hardware is intact.    Doppler report reviewed from 12/19/2022 which indicates acute DVT in one of the 2 paired left peroneal veins and chronic DVT left gastroc vein.  There was evidence of previous ablation of left greater saphenous vein in the thigh and proximal calf.      Assessment/Plan: 1.  Status post left first MTP arthrodesis  2.  Left chronic pes planus  3.  Left acute peroneal DVT with chronic left gastroc DVT  4.  Left foot neuritis    She came off of her walker and out of her postop shoe a little earlier than ideal but seems to be doing well and will continue with sturdy zip up boot or athletic shoe laced less tightly.      We discussed treatment going forward and she would like to see Dr. Johnston from hematology and I placed that referral.  She is seeing Dr. Eloy Steele a vascular specialist on 1/16/2022.    Some of her numbness and tingling may be coming from swelling with some neuritic symptoms but no gross sign of RSD.  We will have her continue with her compression hose.    I did demonstrate heel cord stretching exercises to do at least 3 or 4 times a day.  Instruction sheet was provided and demonstrated for discussed etiology of heel cord tightness to forefoot overload.    She also feel feels that her gait has been a little bit off and we will get her into some physical therapy now to work on gait training as well as some desensitization.    We will see her back in a month with x-rays of the left foot.

## 2023-01-12 ENCOUNTER — CLINICAL SUPPORT (OUTPATIENT)
Dept: ORTHOPEDIC SURGERY | Facility: CLINIC | Age: 68
End: 2023-01-12
Payer: MEDICARE

## 2023-01-12 VITALS — TEMPERATURE: 98.6 F | HEIGHT: 68 IN | WEIGHT: 165 LBS | BODY MASS INDEX: 25.01 KG/M2

## 2023-01-12 DIAGNOSIS — M17.11 PRIMARY OSTEOARTHRITIS OF RIGHT KNEE: Primary | ICD-10-CM

## 2023-01-12 PROCEDURE — 20610 DRAIN/INJ JOINT/BURSA W/O US: CPT | Performed by: NURSE PRACTITIONER

## 2023-01-12 RX ORDER — LIDOCAINE HYDROCHLORIDE 10 MG/ML
2 INJECTION, SOLUTION EPIDURAL; INFILTRATION; INTRACAUDAL; PERINEURAL
Status: COMPLETED | OUTPATIENT
Start: 2023-01-12 | End: 2023-01-12

## 2023-01-12 RX ORDER — METHYLPREDNISOLONE ACETATE 80 MG/ML
80 INJECTION, SUSPENSION INTRA-ARTICULAR; INTRALESIONAL; INTRAMUSCULAR; SOFT TISSUE
Status: COMPLETED | OUTPATIENT
Start: 2023-01-12 | End: 2023-01-12

## 2023-01-12 RX ADMIN — METHYLPREDNISOLONE ACETATE 80 MG: 80 INJECTION, SUSPENSION INTRA-ARTICULAR; INTRALESIONAL; INTRAMUSCULAR; SOFT TISSUE at 13:40

## 2023-01-12 RX ADMIN — LIDOCAINE HYDROCHLORIDE 2 ML: 10 INJECTION, SOLUTION EPIDURAL; INFILTRATION; INTRACAUDAL; PERINEURAL at 13:40

## 2023-01-12 NOTE — PROGRESS NOTES
1/12/2023    Blanka Mueller is here today for worsening knee pain. Pt has undergone injection of the knee in the past with good resolution of symptoms. Pt is requesting a repeat injection.     KNEE Injection Procedure Note:    Large Joint Arthrocentesis: R knee  Date/Time: 1/12/2023 1:40 PM  Consent given by: patient  Site marked: site marked  Timeout: Immediately prior to procedure a time out was called to verify the correct patient, procedure, equipment, support staff and site/side marked as required   Supporting Documentation  Indications: pain   Procedure Details  Location: knee - R knee  Preparation: Patient was prepped and draped in the usual sterile fashion  Needle gauge: 21.  Approach: anterolateral  Medications administered: 80 mg methylPREDNISolone acetate 80 MG/ML; 2 mL lidocaine PF 1% 1 %  Patient tolerance: patient tolerated the procedure well with no immediate complications          At the conclusion of the injection I discussed the importance of continued quad strengthening exercises on a daily basis. I will see the patient back if the symptoms should fail to improve or worsen.    Pooja Mccarty, EVARISTO

## 2023-01-17 ENCOUNTER — CONSULT (OUTPATIENT)
Dept: ONCOLOGY | Facility: CLINIC | Age: 68
End: 2023-01-17
Payer: MEDICARE

## 2023-01-17 ENCOUNTER — LAB (OUTPATIENT)
Dept: OTHER | Facility: HOSPITAL | Age: 68
End: 2023-01-17
Payer: MEDICARE

## 2023-01-17 VITALS
TEMPERATURE: 98.2 F | SYSTOLIC BLOOD PRESSURE: 121 MMHG | HEART RATE: 63 BPM | RESPIRATION RATE: 18 BRPM | BODY MASS INDEX: 25.33 KG/M2 | OXYGEN SATURATION: 97 % | DIASTOLIC BLOOD PRESSURE: 74 MMHG | WEIGHT: 167.1 LBS | HEIGHT: 68 IN

## 2023-01-17 DIAGNOSIS — I82.452 ACUTE DEEP VEIN THROMBOSIS (DVT) OF LEFT PERONEAL VEIN: Primary | ICD-10-CM

## 2023-01-17 DIAGNOSIS — I82.452 ACUTE DEEP VEIN THROMBOSIS (DVT) OF LEFT PERONEAL VEIN: ICD-10-CM

## 2023-01-17 DIAGNOSIS — R79.9 ABNORMAL FINDING OF BLOOD CHEMISTRY, UNSPECIFIED: ICD-10-CM

## 2023-01-17 DIAGNOSIS — I82.890 SPLENIC VEIN THROMBOSIS: Primary | ICD-10-CM

## 2023-01-17 PROBLEM — D50.0 IRON DEFICIENCY ANEMIA DUE TO CHRONIC BLOOD LOSS: Status: ACTIVE | Noted: 2023-01-17

## 2023-01-17 LAB
BASOPHILS # BLD AUTO: 0.07 10*3/MM3 (ref 0–0.2)
BASOPHILS NFR BLD AUTO: 0.8 % (ref 0–1.5)
DEPRECATED RDW RBC AUTO: 49.1 FL (ref 37–54)
EOSINOPHIL # BLD AUTO: 0.74 10*3/MM3 (ref 0–0.4)
EOSINOPHIL NFR BLD AUTO: 8.1 % (ref 0.3–6.2)
ERYTHROCYTE [DISTWIDTH] IN BLOOD BY AUTOMATED COUNT: 15.5 % (ref 12.3–15.4)
FERRITIN SERPL-MCNC: 69.6 NG/ML (ref 13–150)
FOLATE SERPL-MCNC: >20 NG/ML (ref 4.78–24.2)
HCT VFR BLD AUTO: 40.9 % (ref 34–46.6)
HGB BLD-MCNC: 13.6 G/DL (ref 12–15.9)
HGB RETIC QN AUTO: 33.5 PG (ref 29.8–36.1)
IMM GRANULOCYTES # BLD AUTO: 0.05 10*3/MM3 (ref 0–0.05)
IMM GRANULOCYTES NFR BLD AUTO: 0.5 % (ref 0–0.5)
IMM RETICS NFR: 14.1 % (ref 3–15.8)
IRON 24H UR-MRATE: 79 MCG/DL (ref 37–145)
IRON SATN MFR SERPL: 20 % (ref 20–50)
LYMPHOCYTES # BLD AUTO: 1.26 10*3/MM3 (ref 0.7–3.1)
LYMPHOCYTES NFR BLD AUTO: 13.7 % (ref 19.6–45.3)
MCH RBC QN AUTO: 29.2 PG (ref 26.6–33)
MCHC RBC AUTO-ENTMCNC: 33.3 G/DL (ref 31.5–35.7)
MCV RBC AUTO: 87.8 FL (ref 79–97)
MONOCYTES # BLD AUTO: 0.64 10*3/MM3 (ref 0.1–0.9)
MONOCYTES NFR BLD AUTO: 7 % (ref 5–12)
NEUTROPHILS NFR BLD AUTO: 6.41 10*3/MM3 (ref 1.7–7)
NEUTROPHILS NFR BLD AUTO: 69.9 % (ref 42.7–76)
NRBC BLD AUTO-RTO: 0 /100 WBC (ref 0–0.2)
PLATELET # BLD AUTO: 412 10*3/MM3 (ref 140–450)
PMV BLD AUTO: 11 FL (ref 6–12)
RBC # BLD AUTO: 4.66 10*6/MM3 (ref 3.77–5.28)
RETICS # AUTO: 0.07 10*6/MM3 (ref 0.02–0.13)
RETICS/RBC NFR AUTO: 1.46 % (ref 0.7–1.9)
TIBC SERPL-MCNC: 395 MCG/DL (ref 298–536)
TRANSFERRIN SERPL-MCNC: 265 MG/DL (ref 200–360)
VIT B12 BLD-MCNC: 1558 PG/ML (ref 211–946)
WBC NRBC COR # BLD: 9.17 10*3/MM3 (ref 3.4–10.8)

## 2023-01-17 PROCEDURE — 83540 ASSAY OF IRON: CPT | Performed by: INTERNAL MEDICINE

## 2023-01-17 PROCEDURE — 85613 RUSSELL VIPER VENOM DILUTED: CPT | Performed by: INTERNAL MEDICINE

## 2023-01-17 PROCEDURE — 86038 ANTINUCLEAR ANTIBODIES: CPT | Performed by: INTERNAL MEDICINE

## 2023-01-17 PROCEDURE — 82746 ASSAY OF FOLIC ACID SERUM: CPT | Performed by: INTERNAL MEDICINE

## 2023-01-17 PROCEDURE — 85732 THROMBOPLASTIN TIME PARTIAL: CPT | Performed by: INTERNAL MEDICINE

## 2023-01-17 PROCEDURE — 85025 COMPLETE CBC W/AUTO DIFF WBC: CPT | Performed by: INTERNAL MEDICINE

## 2023-01-17 PROCEDURE — 86147 CARDIOLIPIN ANTIBODY EA IG: CPT | Performed by: INTERNAL MEDICINE

## 2023-01-17 PROCEDURE — 82728 ASSAY OF FERRITIN: CPT | Performed by: INTERNAL MEDICINE

## 2023-01-17 PROCEDURE — 86148 ANTI-PHOSPHOLIPID ANTIBODY: CPT | Performed by: INTERNAL MEDICINE

## 2023-01-17 PROCEDURE — 84466 ASSAY OF TRANSFERRIN: CPT | Performed by: INTERNAL MEDICINE

## 2023-01-17 PROCEDURE — 82607 VITAMIN B-12: CPT | Performed by: INTERNAL MEDICINE

## 2023-01-17 PROCEDURE — 86146 BETA-2 GLYCOPROTEIN ANTIBODY: CPT | Performed by: INTERNAL MEDICINE

## 2023-01-17 PROCEDURE — 99204 OFFICE O/P NEW MOD 45 MIN: CPT | Performed by: INTERNAL MEDICINE

## 2023-01-17 PROCEDURE — 36415 COLL VENOUS BLD VENIPUNCTURE: CPT

## 2023-01-17 PROCEDURE — 85046 RETICYTE/HGB CONCENTRATE: CPT | Performed by: INTERNAL MEDICINE

## 2023-01-17 RX ORDER — PREDNISONE 50 MG/1
TABLET ORAL
COMMUNITY
Start: 2023-01-13 | End: 2023-01-23

## 2023-01-17 NOTE — PROGRESS NOTES
Subjective     REASON FOR CONSULTATION: History of extensive portal vein thrombosis 20 years ago.  Patient now on long-term anticoagulation and was found to have a new left lower extremity calf vein thrombus on Doppler study 12/19/2022  Provide an opinion on any further workup or treatment                             REQUESTING PHYSICIAN: Ulises Fairchild MD    RECORDS OBTAINED:  Records of the patients history including those obtained from the referring provider were reviewed and summarized in detail.    HISTORY OF PRESENT ILLNESS:  The patient is a 67 y.o. year old female who is here for an opinion about the above issue.  She is referred to us from her primary care physician due to a history of extensive portal vein and splenic vein thrombus about 20 years ago.  She has been on anticoagulation ever since with Coumadin.  She had been evaluated at the [facility] at 1 point and was negative for factor V Leiden mutation or prothrombin gene mutation.  Her IgG and IgM anticardiolipin antibodies were also negative.    She had recently undergone a Doppler study on lower extremities 12/19/2022 and was found to have a peroneal vein DVT in the left lower extremity.  Upon review of some of her recent coag studies she has had INRs that have been subtherapeutic at times but she has remained on Coumadin.  This clot occurred in the setting of recent surgery on the left foot for osteophyte.    At this point I wonder if she may be a good candidate for one of the newer DOAC drugs such as Eliquis or Xarelto.    History of Present Illness     Past Medical History:   Diagnosis Date   • Acute torn meniscus     Right   • Ankle sprain 1967    many times as a kid   • Arthritis     DEGENERATIVE HANDS AND FEET   • Arthritis of back    • Arthritis of neck 2012    femoral neck in osteopenic range   • CTS (carpal tunnel syndrome) 2011    or degenerative arthritis   • Difficulty urinating    • Dry eyes    • Eczema    • Esophageal varices  (HCC)    • Fracture, foot 2021    not a fracture but you provide no space for my feet problem of arthritus/bunions/constant pain/flat footed   • Gastritis    • Great toe pain    • Hiatal hernia    • History of dizziness    • Hypertension    • Knee swelling 2020   • Legal blindness     LEFT EYE   • Low back strain 1980    not as bad now   • Mesenteric venous thrombosis (HCC)     PORTAL AND SPLENIC   • Pain     ABD WITH CERTAIN FOOD   • Pelvic pressure in female     WITH CERTAIN MOVEMENTS AND WHEN SITTING   • Portal hypertension (HCC)    • Portal vein thrombosis    • Splenic vein thrombosis    • Splenomegaly    • Tear of meniscus of knee 2020    R knee osteoarthritis/torn Mesiscus 2 places   • Ureter, stricture     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 AND BEING FOLLOWED BY MICHELLE DESIR MD        Past Surgical History:   Procedure Laterality Date   • APPENDECTOMY N/A 1985   • COLONOSCOPY N/A 2019   • CYSTOSCOPY Bilateral 10/14/2020    Procedure: CYSTOSCOPY RETROGRADE PYELOGRAM;  Surgeon: Jaiden Desir MD;  Location: [facility];  Service: Urology;  Laterality: Bilateral;   • CYSTOSCOPY W/ URETERAL STENT REMOVAL     • ENDOSCOPY N/A 2019   • EXPLORATORY LAPAROTOMY N/A 2001   • EXPLORATORY LAPAROTOMY      ORIGINALLY LOOKING FOR OVARIAN CANCER, WAS NOT FOUND.  FOUND MESENTERIC/PORTAL/SPLENIC VENOUS THROMBOSIS   • FOOT FUSION Left 10/25/2022    Procedure: Left first metatarsal phalangeal joint fusion;  Surgeon: Ulises Fairchild MD;  Location: [facility];  Service: Orthopedics;  Laterality: Left;   • HERNIA REPAIR N/A    • INGUINAL HERNIA REPAIR Left 01/20/2021    Procedure: open ventral hernia repair with mesh;  Surgeon: Yoanna Jacobo MD;  Location: [facility];  Service: General;  Laterality: Left;   • LAPAROSCOPIC CHOLECYSTECTOMY N/A 2001   • TONSILLECTOMY     • URETERAL STENT INSERTION          Current Outpatient Medications on File Prior to Visit   Medication Sig Dispense Refill   • acetaminophen (TYLENOL)  500 MG tablet Take 500 mg by mouth Every 6 (Six) Hours As Needed for Mild Pain .     • ascorbic acid (VITAMIN C) 500 MG tablet Take 1 tablet by mouth Daily. PT HOLDING FOR SURGERY     • Calcium Carb-Cholecalciferol (CALCIUM/VITAMIN D PO) Take 1 tablet by mouth Daily. HOLD FOR SURGERY  1200MG/800IU     • cetirizine (zyrTEC) 10 MG tablet Take 10 mg by mouth At Night As Needed for Allergies.     • Cholecalciferol (Vitamin D3) 50 MCG (2000 UT) capsule Take 1 capsule by mouth Daily.     • levothyroxine (SYNTHROID, LEVOTHROID) 50 MCG tablet Take 50 mcg by mouth Daily.     • Magnesium 400 MG capsule Take 1 tablet by mouth Daily. PT HOLDING FOR SURGERY     • multivitamin (THERAGRAN) tablet tablet Take 1 tablet by mouth Daily. PT HOLDING FOR SURGERY     • nadolol (CORGARD) 20 MG tablet Take 1.5 tablets by mouth Every Morning.     • predniSONE (DELTASONE) 50 MG tablet      • Probiotic Product (PROBIOTIC DAILY PO) Take 1 capsule by mouth Every Night.     • warfarin (COUMADIN) 4 MG tablet Take 1 tablet by mouth Daily. TAKES WITH 5 MG NIGHTLYTO HOLD STARTING 10/19/22 PER MD INSTRUCTIONS  /     • warfarin (COUMADIN) 5 MG tablet Take 1 tablet by mouth Every Night. TAKES WITH 4 MG/TO HOLD STARTING 10/19/22 PER MD INSTRUCTIONS     • Zinc 50 MG capsule Take 1 tablet by mouth Daily.     • [DISCONTINUED] PREBIOTIC PRODUCT PO Take 1 tablet by mouth Daily. PT HOLDING FOR SURGERY       No current facility-administered medications on file prior to visit.        ALLERGIES:    Allergies   Allergen Reactions   • Contrast Dye (Echo Or Unknown Ct/Mr) Swelling     FACIAL   • Diclofenac Other (See Comments)     Bad heartburn causing pressure in the chest   • Aspartame Itching   • Morphine Headache   • Lamisil [Terbinafine] Swelling     JOINT PAIN, REDNESS   • Dexamethasone GI Intolerance   • Prednisone Rash and GI Intolerance     CAN TAKE IF NEEDED        Social History     Socioeconomic History   • Marital status:    • Number of children:  "3   Tobacco Use   • Smoking status: Never   • Smokeless tobacco: Never   Vaping Use   • Vaping Use: Never used   Substance and Sexual Activity   • Alcohol use: Yes     Comment: RARELY   • Drug use: Never   • Sexual activity: Never        Family History   Problem Relation Age of Onset   • Osteoporosis Mother    • Breast cancer Sister    • Cancer Sister         lymphnodes   • Cancer Sister         pallete   • Colon cancer Paternal Grandfather    • Malig Hyperthermia Neg Hx         Review of Systems   Constitutional: Negative for activity change, appetite change, chills, fatigue, fever and unexpected weight change.   HENT: Negative for hearing loss, mouth sores, nosebleeds, trouble swallowing and voice change.    Eyes: Negative for pain and visual disturbance.   Respiratory: Negative for cough, shortness of breath and wheezing.    Cardiovascular: Negative for chest pain and palpitations.   Gastrointestinal: Negative for abdominal pain, constipation, diarrhea, nausea and vomiting.   Genitourinary: Negative for difficulty urinating, frequency, hematuria and urgency.   Musculoskeletal: Negative for arthralgias, back pain, joint swelling and neck pain.   Skin: Negative for rash.   Neurological: Negative for dizziness, seizures, syncope, weakness and headaches.   Hematological: Negative for adenopathy. Does not bruise/bleed easily.   Psychiatric/Behavioral: Negative for behavioral problems and confusion. The patient is not nervous/anxious.         Objective     Vitals:    01/17/23 1041   BP: 121/74   Pulse: 63   Resp: 18   Temp: 98.2 °F (36.8 °C)   TempSrc: Temporal   SpO2: 97%   Weight: 75.8 kg (167 lb 1.6 oz)   Height: 172.7 cm (67.99\")   PainSc:   3   PainLoc: Abdomen     Current Status 1/17/2023   ECOG score 0       Physical Exam  Constitutional:       General: She is not in acute distress.     Appearance: She is well-developed.   HENT:      Head: Normocephalic.   Eyes:      General: No scleral icterus.     " Conjunctiva/sclera: Conjunctivae normal.      Pupils: Pupils are equal, round, and reactive to light.   Neck:      Thyroid: No thyromegaly.      Vascular: No JVD.   Cardiovascular:      Rate and Rhythm: Normal rate and regular rhythm.      Heart sounds: No murmur heard.    No friction rub. No gallop.   Pulmonary:      Effort: Pulmonary effort is normal.      Breath sounds: Normal breath sounds. No wheezing or rales.   Abdominal:      General: There is no distension.      Palpations: Abdomen is soft. There is no mass.      Tenderness: There is no abdominal tenderness.   Musculoskeletal:         General: No deformity. Normal range of motion.      Cervical back: Normal range of motion and neck supple.   Lymphadenopathy:      Cervical: No cervical adenopathy.   Skin:     General: Skin is warm and dry.      Findings: No erythema or rash.   Neurological:      Mental Status: She is alert and oriented to person, place, and time.      Cranial Nerves: No cranial nerve deficit.      Deep Tendon Reflexes: Reflexes are normal and symmetric.   Psychiatric:         Behavior: Behavior normal.         Judgment: Judgment normal.           RECENT LABS:  Hematology WBC   Date Value Ref Range Status   01/17/2023 9.17 3.40 - 10.80 10*3/mm3 Final     RBC   Date Value Ref Range Status   01/17/2023 4.66 3.77 - 5.28 10*6/mm3 Final     Hemoglobin   Date Value Ref Range Status   01/17/2023 13.6 12.0 - 15.9 g/dL Final     Hematocrit   Date Value Ref Range Status   01/17/2023 40.9 34.0 - 46.6 % Final     Platelets   Date Value Ref Range Status   01/17/2023 412 140 - 450 10*3/mm3 Final        Lab Results   Component Value Date    GLUCOSE 94 10/17/2022    CALCIUM 9.6 10/17/2022     10/17/2022    K 4.7 10/17/2022    CO2 26.1 10/17/2022     10/17/2022    BUN 21 10/17/2022    CREATININE 0.94 10/17/2022    EGFRIFNONA 79 01/13/2021    BCR 22.3 10/17/2022    ANIONGAP 8.9 10/17/2022       Lab Results   Component Value Date    INR 1.34 (H)  10/25/2022    INR 1.48 (H) 01/20/2021    INR 1.44 (H) 10/14/2020    PROTIME 16.8 (H) 10/25/2022    PROTIME 17.7 (H) 01/20/2021    PROTIME 17.3 (H) 10/14/2020         VENOUS DOPPLER 12/19/2022  Interpretation Summary       •  Acute deep vein thrombosis in 1 of 2 paired left peroneal veins.  •  Chronic deep vein thrombosis in the left gastrocnemius vein.  •  Previous ablation of the left great saphenous vein in the thigh and proximal calf.  •  All other left sided veins appeared normal.      Assessment & Plan     1.  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  She was worked up at the [facility] at that time with no obvious thrombophilic defect.  2.  Chronic Coumadin anticoagulation.  She has been on Coumadin for the past 20 years  3.  Left calf vein thrombus in the setting of being postop from foot surgery.  4.  Venous insufficiency    Recommendations  1.  The patient is now therapeutic on Coumadin and I do not think we need to consider this a Coumadin failure since the clot occurred postoperatively.  2.  We have asked the patient to allow us to draw some repeat labs today to perform a more extensive antiphospholipid antibody panel and repeat her lupus anticoagulant assay.  3.  We will plan to see her back in the office in a couple weeks to review the results and I told her at that time we may discuss whether or not it would be to her benefit to consider switching to Eliquis.  4.  The patient also requested referral to a vascular surgeon and we will arrange referral to Dr. Mary Ayala at [facility].    Thanks for allowing us to see this nice and very interesting patient in consultation.

## 2023-01-17 NOTE — LETTER
January 17, 2023     Ulises Fairchild MD  [address]  [facility] [address]    Patient: Blanka Mueller   YOB: 1955   Date of Visit: 1/17/2023       Dear Dr. Devorah MD:    Thank you for referring Blanka Mueller to me for evaluation. Below are the relevant portions of my assessment and plan of care.    If you have questions, please do not hesitate to call me. I look forward to following Blanka along with you.         Sincerely,        Marc Singh MD        CC: MD Mary Rose MD Webb, Marc TRACY MD  01/17/23 1128  Signed    Subjective      REASON FOR CONSULTATION: History of extensive portal vein thrombosis 20 years ago.  Patient now on long-term anticoagulation and was found to have a new left lower extremity calf vein thrombus on Doppler study 12/19/2022  Provide an opinion on any further workup or treatment                             REQUESTING PHYSICIAN: Ulises Fairchild MD    RECORDS OBTAINED:  Records of the patients history including those obtained from the referring provider were reviewed and summarized in detail.    HISTORY OF PRESENT ILLNESS:  The patient is a 67 y.o. year old female who is here for an opinion about the above issue.  She is referred to us from her primary care physician due to a history of extensive portal vein and splenic vein thrombus about 20 years ago.  She has been on anticoagulation ever since with Coumadin.  She had been evaluated at the [facility] at 1 point and was negative for factor V Leiden mutation or prothrombin gene mutation.  Her IgG and IgM anticardiolipin antibodies were also negative.    She had recently undergone a Doppler study on lower extremities 12/19/2022 and was found to have a peroneal vein DVT in the left lower extremity.  Upon review of some of her recent coag studies she has had INRs that have been subtherapeutic at times but she has remained on Coumadin.  This clot occurred in  the setting of recent surgery on the left foot for osteophyte.    At this point I wonder if she may be a good candidate for one of the newer DOAC drugs such as Eliquis or Xarelto.    History of Present Illness     Past Medical History:   Diagnosis Date   • Acute torn meniscus     Right   • Ankle sprain 1967    many times as a kid   • Arthritis     DEGENERATIVE HANDS AND FEET   • Arthritis of back    • Arthritis of neck 2012    femoral neck in osteopenic range   • CTS (carpal tunnel syndrome) 2011    or degenerative arthritis   • Difficulty urinating    • Dry eyes    • Eczema    • Esophageal varices (HCC)    • Fracture, foot 2021    not a fracture but you provide no space for my feet problem of arthritus/bunions/constant pain/flat footed   • Gastritis    • Great toe pain    • Hiatal hernia    • History of dizziness    • Hypertension    • Knee swelling 2020   • Legal blindness     LEFT EYE   • Low back strain 1980    not as bad now   • Mesenteric venous thrombosis (HCC)     PORTAL AND SPLENIC   • Pain     ABD WITH CERTAIN FOOD   • Pelvic pressure in female     WITH CERTAIN MOVEMENTS AND WHEN SITTING   • Portal hypertension (HCC)    • Portal vein thrombosis    • Splenic vein thrombosis    • Splenomegaly    • Tear of meniscus of knee 2020    R knee osteoarthritis/torn Mesiscus 2 places   • Ureter, stricture     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 AND BEING FOLLOWED BY MICHELLE DESIR MD        Past Surgical History:   Procedure Laterality Date   • APPENDECTOMY N/A 1985   • COLONOSCOPY N/A 2019   • CYSTOSCOPY Bilateral 10/14/2020    Procedure: CYSTOSCOPY RETROGRADE PYELOGRAM;  Surgeon: Jaiden Desir MD;  Location: [facility];  Service: Urology;  Laterality: Bilateral;   • CYSTOSCOPY W/ URETERAL STENT REMOVAL     • ENDOSCOPY N/A 2019   • EXPLORATORY LAPAROTOMY N/A 2001   • EXPLORATORY LAPAROTOMY      ORIGINALLY LOOKING FOR OVARIAN CANCER, WAS NOT FOUND.  FOUND MESENTERIC/PORTAL/SPLENIC VENOUS THROMBOSIS   • FOOT FUSION Left  10/25/2022    Procedure: Left first metatarsal phalangeal joint fusion;  Surgeon: Ulises Fairchild MD;  Location: [facility] OR [facility];  Service: Orthopedics;  Laterality: Left;   • HERNIA REPAIR N/A    • INGUINAL HERNIA REPAIR Left 01/20/2021    Procedure: open ventral hernia repair with mesh;  Surgeon: Yoanna Jacobo MD;  Location: [facility] MAIN OR;  Service: General;  Laterality: Left;   • LAPAROSCOPIC CHOLECYSTECTOMY N/A 2001   • TONSILLECTOMY     • URETERAL STENT INSERTION          Current Outpatient Medications on File Prior to Visit   Medication Sig Dispense Refill   • acetaminophen (TYLENOL) 500 MG tablet Take 500 mg by mouth Every 6 (Six) Hours As Needed for Mild Pain .     • ascorbic acid (VITAMIN C) 500 MG tablet Take 1 tablet by mouth Daily. PT HOLDING FOR SURGERY     • Calcium Carb-Cholecalciferol (CALCIUM/VITAMIN D PO) Take 1 tablet by mouth Daily. HOLD FOR SURGERY  1200MG/800IU     • cetirizine (zyrTEC) 10 MG tablet Take 10 mg by mouth At Night As Needed for Allergies.     • Cholecalciferol (Vitamin D3) 50 MCG (2000 UT) capsule Take 1 capsule by mouth Daily.     • levothyroxine (SYNTHROID, LEVOTHROID) 50 MCG tablet Take 50 mcg by mouth Daily.     • Magnesium 400 MG capsule Take 1 tablet by mouth Daily. PT HOLDING FOR SURGERY     • multivitamin (THERAGRAN) tablet tablet Take 1 tablet by mouth Daily. PT HOLDING FOR SURGERY     • nadolol (CORGARD) 20 MG tablet Take 1.5 tablets by mouth Every Morning.     • predniSONE (DELTASONE) 50 MG tablet      • Probiotic Product (PROBIOTIC DAILY PO) Take 1 capsule by mouth Every Night.     • warfarin (COUMADIN) 4 MG tablet Take 1 tablet by mouth Daily. TAKES WITH 5 MG NIGHTLYTO HOLD STARTING 10/19/22 PER MD INSTRUCTIONS  /     • warfarin (COUMADIN) 5 MG tablet Take 1 tablet by mouth Every Night. TAKES WITH 4 MG/TO HOLD STARTING 10/19/22 PER MD INSTRUCTIONS     • Zinc 50 MG capsule Take 1 tablet by mouth Daily.     • [DISCONTINUED] PREBIOTIC PRODUCT PO Take 1 tablet  by mouth Daily. PT HOLDING FOR SURGERY       No current facility-administered medications on file prior to visit.        ALLERGIES:    Allergies   Allergen Reactions   • Contrast Dye (Echo Or Unknown Ct/Mr) Swelling     FACIAL   • Diclofenac Other (See Comments)     Bad heartburn causing pressure in the chest   • Aspartame Itching   • Morphine Headache   • Lamisil [Terbinafine] Swelling     JOINT PAIN, REDNESS   • Dexamethasone GI Intolerance   • Prednisone Rash and GI Intolerance     CAN TAKE IF NEEDED        Social History     Socioeconomic History   • Marital status:    • Number of children: 3   Tobacco Use   • Smoking status: Never   • Smokeless tobacco: Never   Vaping Use   • Vaping Use: Never used   Substance and Sexual Activity   • Alcohol use: Yes     Comment: RARELY   • Drug use: Never   • Sexual activity: Never        Family History   Problem Relation Age of Onset   • Osteoporosis Mother    • Breast cancer Sister    • Cancer Sister         lymphnodes   • Cancer Sister         pallete   • Colon cancer Paternal Grandfather    • Malig Hyperthermia Neg Hx         Review of Systems   Constitutional: Negative for activity change, appetite change, chills, fatigue, fever and unexpected weight change.   HENT: Negative for hearing loss, mouth sores, nosebleeds, trouble swallowing and voice change.    Eyes: Negative for pain and visual disturbance.   Respiratory: Negative for cough, shortness of breath and wheezing.    Cardiovascular: Negative for chest pain and palpitations.   Gastrointestinal: Negative for abdominal pain, constipation, diarrhea, nausea and vomiting.   Genitourinary: Negative for difficulty urinating, frequency, hematuria and urgency.   Musculoskeletal: Negative for arthralgias, back pain, joint swelling and neck pain.   Skin: Negative for rash.   Neurological: Negative for dizziness, seizures, syncope, weakness and headaches.   Hematological: Negative for adenopathy. Does not bruise/bleed  "easily.   Psychiatric/Behavioral: Negative for behavioral problems and confusion. The patient is not nervous/anxious.         Objective      Vitals:    01/17/23 1041   BP: 121/74   Pulse: 63   Resp: 18   Temp: 98.2 °F (36.8 °C)   TempSrc: Temporal   SpO2: 97%   Weight: 75.8 kg (167 lb 1.6 oz)   Height: 172.7 cm (67.99\")   PainSc:   3   PainLoc: Abdomen     Current Status 1/17/2023   ECOG score 0       Physical Exam  Constitutional:       General: She is not in acute distress.     Appearance: She is well-developed.   HENT:      Head: Normocephalic.   Eyes:      General: No scleral icterus.     Conjunctiva/sclera: Conjunctivae normal.      Pupils: Pupils are equal, round, and reactive to light.   Neck:      Thyroid: No thyromegaly.      Vascular: No JVD.   Cardiovascular:      Rate and Rhythm: Normal rate and regular rhythm.      Heart sounds: No murmur heard.    No friction rub. No gallop.   Pulmonary:      Effort: Pulmonary effort is normal.      Breath sounds: Normal breath sounds. No wheezing or rales.   Abdominal:      General: There is no distension.      Palpations: Abdomen is soft. There is no mass.      Tenderness: There is no abdominal tenderness.   Musculoskeletal:         General: No deformity. Normal range of motion.      Cervical back: Normal range of motion and neck supple.   Lymphadenopathy:      Cervical: No cervical adenopathy.   Skin:     General: Skin is warm and dry.      Findings: No erythema or rash.   Neurological:      Mental Status: She is alert and oriented to person, place, and time.      Cranial Nerves: No cranial nerve deficit.      Deep Tendon Reflexes: Reflexes are normal and symmetric.   Psychiatric:         Behavior: Behavior normal.         Judgment: Judgment normal.           RECENT LABS:  Hematology WBC   Date Value Ref Range Status   01/17/2023 9.17 3.40 - 10.80 10*3/mm3 Final     RBC   Date Value Ref Range Status   01/17/2023 4.66 3.77 - 5.28 10*6/mm3 Final     Hemoglobin   Date " Value Ref Range Status   01/17/2023 13.6 12.0 - 15.9 g/dL Final     Hematocrit   Date Value Ref Range Status   01/17/2023 40.9 34.0 - 46.6 % Final     Platelets   Date Value Ref Range Status   01/17/2023 412 140 - 450 10*3/mm3 Final        Lab Results   Component Value Date    GLUCOSE 94 10/17/2022    CALCIUM 9.6 10/17/2022     10/17/2022    K 4.7 10/17/2022    CO2 26.1 10/17/2022     10/17/2022    BUN 21 10/17/2022    CREATININE 0.94 10/17/2022    EGFRIFNONA 79 01/13/2021    BCR 22.3 10/17/2022    ANIONGAP 8.9 10/17/2022       Lab Results   Component Value Date    INR 1.34 (H) 10/25/2022    INR 1.48 (H) 01/20/2021    INR 1.44 (H) 10/14/2020    PROTIME 16.8 (H) 10/25/2022    PROTIME 17.7 (H) 01/20/2021    PROTIME 17.3 (H) 10/14/2020         VENOUS DOPPLER 12/19/2022  Interpretation Summary       •  Acute deep vein thrombosis in 1 of 2 paired left peroneal veins.  •  Chronic deep vein thrombosis in the left gastrocnemius vein.  •  Previous ablation of the left great saphenous vein in the thigh and proximal calf.  •  All other left sided veins appeared normal.      Assessment & Plan     1.  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  She was worked up at the [facility] at that time with no obvious thrombophilic defect.  2.  Chronic Coumadin anticoagulation.  She has been on Coumadin for the past 20 years  3.  Left calf vein thrombus in the setting of being postop from foot surgery.  4.  Venous insufficiency    Recommendations  1.  The patient is now therapeutic on Coumadin and I do not think we need to consider this a Coumadin failure since the clot occurred postoperatively.  2.  We have asked the patient to allow us to draw some repeat labs today to perform a more extensive antiphospholipid antibody panel and repeat her lupus anticoagulant assay.  3.  We will plan to see her back in the office in a couple weeks to review the results  and I told her at that time we may discuss whether or not it would be to her benefit to consider switching to Eliquis.  4.  The patient also requested referral to a vascular surgeon and we will arrange referral to Dr. Mary Ayala at [facility].    Thanks for allowing us to see this nice and very interesting patient in consultation.

## 2023-01-18 ENCOUNTER — TRANSCRIBE ORDERS (OUTPATIENT)
Dept: ADMINISTRATIVE | Facility: HOSPITAL | Age: 68
End: 2023-01-18
Payer: MEDICARE

## 2023-01-18 ENCOUNTER — PATIENT ROUNDING (BHMG ONLY) (OUTPATIENT)
Dept: ONCOLOGY | Facility: CLINIC | Age: 68
End: 2023-01-18
Payer: MEDICARE

## 2023-01-18 DIAGNOSIS — R93.3 ABNORMAL FINDINGS ON DIAGNOSTIC IMAGING OF OTHER PARTS OF DIGESTIVE TRACT: Primary | ICD-10-CM

## 2023-01-18 LAB
ANA SER QL: NEGATIVE
CARDIOLIPIN IGG SER IA-ACNC: <9 GPL U/ML (ref 0–14)
CARDIOLIPIN IGM SER IA-ACNC: <9 MPL U/ML (ref 0–12)

## 2023-01-19 LAB
B2 GLYCOPROT1 IGA SER-ACNC: <9 GPI IGA UNITS (ref 0–25)
B2 GLYCOPROT1 IGG SER-ACNC: <9 GPI IGG UNITS (ref 0–20)
B2 GLYCOPROT1 IGM SER-ACNC: <9 GPI IGM UNITS (ref 0–32)

## 2023-01-20 ENCOUNTER — TELEPHONE (OUTPATIENT)
Dept: ONCOLOGY | Facility: CLINIC | Age: 68
End: 2023-01-20
Payer: MEDICARE

## 2023-01-20 LAB
APTT-LA 1H NP PPP: 45.2 SEC (ref 0–48.9)
LA 2 SCREEN W REFLEX-IMP: ABNORMAL
MIXING APTT: 44.4 SEC (ref 0–48.9)
MIXING DRVVT: 38.5 SEC (ref 0–40.4)
PS IGG SER-ACNC: 10 UNITS (ref 0–30)
PS IGM SER-ACNC: 14 UNITS (ref 0–30)
SCREEN APTT: 60.8 SEC (ref 0–51.9)
SCREEN DRVVT: 54.8 SEC (ref 0–47)

## 2023-01-20 NOTE — TELEPHONE ENCOUNTER
Caller: Blanka Mueller    Relationship to patient: Self    Best call back number: [phone]    Chief complaint: R/S    Type of visit: LAB AND FOLLOW UP    Requested date:1-23, 1-24,  1-26, 1-30, AILIN WAS OK    If rescheduling, when is the original appointment: 1-31    Additional notes:PLEASE ADVISE

## 2023-01-21 ENCOUNTER — HOSPITAL ENCOUNTER (INPATIENT)
Facility: HOSPITAL | Age: 68
LOS: 1 days | Discharge: ANOTHER HOSPITAL | DRG: 813 | End: 2023-01-23
Attending: EMERGENCY MEDICINE | Admitting: INTERNAL MEDICINE
Payer: MEDICARE

## 2023-01-21 ENCOUNTER — APPOINTMENT (OUTPATIENT)
Dept: CT IMAGING | Facility: HOSPITAL | Age: 68
DRG: 813 | End: 2023-01-21
Payer: MEDICARE

## 2023-01-21 DIAGNOSIS — K92.2 LOWER GI BLEED: Primary | ICD-10-CM

## 2023-01-21 DIAGNOSIS — Z79.01 ANTICOAGULATED ON COUMADIN: ICD-10-CM

## 2023-01-21 DIAGNOSIS — D68.59 HYPERCOAGULABLE STATE: ICD-10-CM

## 2023-01-21 DIAGNOSIS — D62 ACUTE BLOOD LOSS ANEMIA: ICD-10-CM

## 2023-01-21 LAB
ABO GROUP BLD: NORMAL
ALBUMIN SERPL-MCNC: 4.1 G/DL (ref 3.5–5.2)
ALBUMIN/GLOB SERPL: 1.6 G/DL
ALP SERPL-CCNC: 81 U/L (ref 39–117)
ALT SERPL W P-5'-P-CCNC: 25 U/L (ref 1–33)
ANION GAP SERPL CALCULATED.3IONS-SCNC: 6.3 MMOL/L (ref 5–15)
AST SERPL-CCNC: 28 U/L (ref 1–32)
BASOPHILS # BLD AUTO: 0.09 10*3/MM3 (ref 0–0.2)
BASOPHILS NFR BLD AUTO: 0.9 % (ref 0–1.5)
BILIRUB SERPL-MCNC: 0.8 MG/DL (ref 0–1.2)
BLD GP AB SCN SERPL QL: NEGATIVE
BUN SERPL-MCNC: 26 MG/DL (ref 8–23)
BUN/CREAT SERPL: 37.7 (ref 7–25)
CALCIUM SPEC-SCNC: 8.8 MG/DL (ref 8.6–10.5)
CHLORIDE SERPL-SCNC: 109 MMOL/L (ref 98–107)
CO2 SERPL-SCNC: 26.7 MMOL/L (ref 22–29)
CREAT SERPL-MCNC: 0.69 MG/DL (ref 0.57–1)
D-LACTATE SERPL-SCNC: 1.1 MMOL/L (ref 0.5–2)
DEPRECATED RDW RBC AUTO: 48.2 FL (ref 37–54)
EGFRCR SERPLBLD CKD-EPI 2021: 96.5 ML/MIN/1.73
EOSINOPHIL # BLD AUTO: 0.81 10*3/MM3 (ref 0–0.4)
EOSINOPHIL NFR BLD AUTO: 8.3 % (ref 0.3–6.2)
ERYTHROCYTE [DISTWIDTH] IN BLOOD BY AUTOMATED COUNT: 15 % (ref 12.3–15.4)
FERRITIN SERPL-MCNC: 70.8 NG/ML (ref 13–150)
GLOBULIN UR ELPH-MCNC: 2.5 GM/DL
GLUCOSE SERPL-MCNC: 100 MG/DL (ref 65–99)
HCT VFR BLD AUTO: 36.9 % (ref 34–46.6)
HGB BLD-MCNC: 12.4 G/DL (ref 12–15.9)
IMM GRANULOCYTES # BLD AUTO: 0.09 10*3/MM3 (ref 0–0.05)
IMM GRANULOCYTES NFR BLD AUTO: 0.9 % (ref 0–0.5)
INR PPP: 2.98 (ref 0.9–1.1)
IRON 24H UR-MRATE: 134 MCG/DL (ref 37–145)
IRON SATN MFR SERPL: 40 % (ref 20–50)
LYMPHOCYTES # BLD AUTO: 1.52 10*3/MM3 (ref 0.7–3.1)
LYMPHOCYTES NFR BLD AUTO: 15.6 % (ref 19.6–45.3)
MCH RBC QN AUTO: 30 PG (ref 26.6–33)
MCHC RBC AUTO-ENTMCNC: 33.6 G/DL (ref 31.5–35.7)
MCV RBC AUTO: 89.1 FL (ref 79–97)
MONOCYTES # BLD AUTO: 0.73 10*3/MM3 (ref 0.1–0.9)
MONOCYTES NFR BLD AUTO: 7.5 % (ref 5–12)
NEUTROPHILS NFR BLD AUTO: 6.51 10*3/MM3 (ref 1.7–7)
NEUTROPHILS NFR BLD AUTO: 66.8 % (ref 42.7–76)
NRBC BLD AUTO-RTO: 0 /100 WBC (ref 0–0.2)
PLATELET # BLD AUTO: 473 10*3/MM3 (ref 140–450)
PMV BLD AUTO: 10.4 FL (ref 6–12)
POTASSIUM SERPL-SCNC: 4.5 MMOL/L (ref 3.5–5.2)
PROT SERPL-MCNC: 6.6 G/DL (ref 6–8.5)
PROTHROMBIN TIME: 31.4 SECONDS (ref 11.7–14.2)
RBC # BLD AUTO: 4.14 10*6/MM3 (ref 3.77–5.28)
RH BLD: POSITIVE
SODIUM SERPL-SCNC: 142 MMOL/L (ref 136–145)
T&S EXPIRATION DATE: NORMAL
TIBC SERPL-MCNC: 337 MCG/DL (ref 298–536)
TRANSFERRIN SERPL-MCNC: 226 MG/DL (ref 200–360)
WBC NRBC COR # BLD: 9.75 10*3/MM3 (ref 3.4–10.8)

## 2023-01-21 PROCEDURE — 25010000002 OCTREOTIDE PER 25 MCG: Performed by: INTERNAL MEDICINE

## 2023-01-21 PROCEDURE — 25010000002 CEFTRIAXONE PER 250 MG: Performed by: INTERNAL MEDICINE

## 2023-01-21 PROCEDURE — 86923 COMPATIBILITY TEST ELECTRIC: CPT

## 2023-01-21 PROCEDURE — G0378 HOSPITAL OBSERVATION PER HR: HCPCS

## 2023-01-21 PROCEDURE — 85610 PROTHROMBIN TIME: CPT | Performed by: EMERGENCY MEDICINE

## 2023-01-21 PROCEDURE — 86850 RBC ANTIBODY SCREEN: CPT | Performed by: EMERGENCY MEDICINE

## 2023-01-21 PROCEDURE — 74177 CT ABD & PELVIS W/CONTRAST: CPT

## 2023-01-21 PROCEDURE — 25010000002 METHYLPREDNISOLONE PER 125 MG: Performed by: EMERGENCY MEDICINE

## 2023-01-21 PROCEDURE — 86901 BLOOD TYPING SEROLOGIC RH(D): CPT | Performed by: EMERGENCY MEDICINE

## 2023-01-21 PROCEDURE — 99285 EMERGENCY DEPT VISIT HI MDM: CPT

## 2023-01-21 PROCEDURE — 86900 BLOOD TYPING SEROLOGIC ABO: CPT | Performed by: EMERGENCY MEDICINE

## 2023-01-21 PROCEDURE — 83605 ASSAY OF LACTIC ACID: CPT | Performed by: EMERGENCY MEDICINE

## 2023-01-21 PROCEDURE — 84466 ASSAY OF TRANSFERRIN: CPT | Performed by: INTERNAL MEDICINE

## 2023-01-21 PROCEDURE — 25010000002 IOPAMIDOL 61 % SOLUTION: Performed by: EMERGENCY MEDICINE

## 2023-01-21 PROCEDURE — 82728 ASSAY OF FERRITIN: CPT | Performed by: INTERNAL MEDICINE

## 2023-01-21 PROCEDURE — 80053 COMPREHEN METABOLIC PANEL: CPT | Performed by: EMERGENCY MEDICINE

## 2023-01-21 PROCEDURE — 83540 ASSAY OF IRON: CPT | Performed by: INTERNAL MEDICINE

## 2023-01-21 PROCEDURE — 25010000002 DIPHENHYDRAMINE PER 50 MG: Performed by: EMERGENCY MEDICINE

## 2023-01-21 PROCEDURE — 85025 COMPLETE CBC W/AUTO DIFF WBC: CPT | Performed by: EMERGENCY MEDICINE

## 2023-01-21 PROCEDURE — 99284 EMERGENCY DEPT VISIT MOD MDM: CPT

## 2023-01-21 RX ORDER — UREA 10 %
3 LOTION (ML) TOPICAL NIGHTLY PRN
Status: DISCONTINUED | OUTPATIENT
Start: 2023-01-21 | End: 2023-01-23 | Stop reason: HOSPADM

## 2023-01-21 RX ORDER — SODIUM CHLORIDE 9 MG/ML
100 INJECTION, SOLUTION INTRAVENOUS CONTINUOUS
Status: DISCONTINUED | OUTPATIENT
Start: 2023-01-21 | End: 2023-01-23 | Stop reason: HOSPADM

## 2023-01-21 RX ORDER — ONDANSETRON 4 MG/1
4 TABLET, FILM COATED ORAL EVERY 6 HOURS PRN
Status: DISCONTINUED | OUTPATIENT
Start: 2023-01-21 | End: 2023-01-23 | Stop reason: HOSPADM

## 2023-01-21 RX ORDER — ACETAMINOPHEN 325 MG/1
650 TABLET ORAL EVERY 4 HOURS PRN
Status: DISCONTINUED | OUTPATIENT
Start: 2023-01-21 | End: 2023-01-23 | Stop reason: HOSPADM

## 2023-01-21 RX ORDER — SODIUM CHLORIDE 0.9 % (FLUSH) 0.9 %
10 SYRINGE (ML) INJECTION AS NEEDED
Status: DISCONTINUED | OUTPATIENT
Start: 2023-01-21 | End: 2023-01-23 | Stop reason: HOSPADM

## 2023-01-21 RX ORDER — MELATONIN
2000 DAILY
Status: DISCONTINUED | OUTPATIENT
Start: 2023-01-22 | End: 2023-01-23 | Stop reason: HOSPADM

## 2023-01-21 RX ORDER — DIPHENHYDRAMINE HYDROCHLORIDE 50 MG/ML
25 INJECTION INTRAMUSCULAR; INTRAVENOUS ONCE
Status: COMPLETED | OUTPATIENT
Start: 2023-01-21 | End: 2023-01-21

## 2023-01-21 RX ORDER — L.ACID,PARA/B.BIFIDUM/S.THERM 8B CELL
1 CAPSULE ORAL NIGHTLY
Status: DISCONTINUED | OUTPATIENT
Start: 2023-01-21 | End: 2023-01-23 | Stop reason: HOSPADM

## 2023-01-21 RX ORDER — LEVOTHYROXINE SODIUM 0.05 MG/1
50 TABLET ORAL
Status: DISCONTINUED | OUTPATIENT
Start: 2023-01-22 | End: 2023-01-23 | Stop reason: HOSPADM

## 2023-01-21 RX ORDER — NADOLOL 20 MG/1
30 TABLET ORAL
Status: DISCONTINUED | OUTPATIENT
Start: 2023-01-22 | End: 2023-01-23 | Stop reason: HOSPADM

## 2023-01-21 RX ORDER — METHYLPREDNISOLONE SODIUM SUCCINATE 125 MG/2ML
125 INJECTION, POWDER, LYOPHILIZED, FOR SOLUTION INTRAMUSCULAR; INTRAVENOUS ONCE
Status: COMPLETED | OUTPATIENT
Start: 2023-01-21 | End: 2023-01-21

## 2023-01-21 RX ORDER — ONDANSETRON 2 MG/ML
4 INJECTION INTRAMUSCULAR; INTRAVENOUS EVERY 6 HOURS PRN
Status: DISCONTINUED | OUTPATIENT
Start: 2023-01-21 | End: 2023-01-23 | Stop reason: HOSPADM

## 2023-01-21 RX ORDER — ASCORBIC ACID 500 MG
500 TABLET ORAL DAILY
Status: DISCONTINUED | OUTPATIENT
Start: 2023-01-22 | End: 2023-01-23 | Stop reason: HOSPADM

## 2023-01-21 RX ORDER — DIPHENOXYLATE HYDROCHLORIDE AND ATROPINE SULFATE 2.5; .025 MG/1; MG/1
1 TABLET ORAL DAILY
Status: DISCONTINUED | OUTPATIENT
Start: 2023-01-22 | End: 2023-01-23 | Stop reason: HOSPADM

## 2023-01-21 RX ADMIN — DIPHENHYDRAMINE HYDROCHLORIDE 25 MG: 50 INJECTION, SOLUTION INTRAMUSCULAR; INTRAVENOUS at 17:45

## 2023-01-21 RX ADMIN — METHYLPREDNISOLONE SODIUM SUCCINATE 125 MG: 125 INJECTION, POWDER, FOR SOLUTION INTRAMUSCULAR; INTRAVENOUS at 17:45

## 2023-01-21 RX ADMIN — OCTREOTIDE ACETATE 50 MCG/HR: 500 INJECTION, SOLUTION INTRAVENOUS; SUBCUTANEOUS at 22:40

## 2023-01-21 RX ADMIN — Medication 1 CAPSULE: at 22:40

## 2023-01-21 RX ADMIN — PANTOPRAZOLE SODIUM 8 MG/HR: 40 INJECTION, POWDER, FOR SOLUTION INTRAVENOUS at 21:32

## 2023-01-21 RX ADMIN — SODIUM CHLORIDE 150 ML/HR: 9 INJECTION, SOLUTION INTRAVENOUS at 22:40

## 2023-01-21 RX ADMIN — CEFTRIAXONE SODIUM 1 G: 1 INJECTION, POWDER, FOR SOLUTION INTRAMUSCULAR; INTRAVENOUS at 22:40

## 2023-01-21 RX ADMIN — IOPAMIDOL 85 ML: 612 INJECTION, SOLUTION INTRAVENOUS at 19:02

## 2023-01-22 PROBLEM — D68.318 HEMORRHAGIC DISORDER DUE TO CIRCULATING ANTICOAGULANTS: Status: ACTIVE | Noted: 2023-01-22

## 2023-01-22 PROBLEM — D68.59 THROMBOPHILIA: Status: ACTIVE | Noted: 2023-01-22

## 2023-01-22 PROBLEM — D62 ACUTE BLOOD LOSS ANEMIA: Status: ACTIVE | Noted: 2023-01-22

## 2023-01-22 LAB
ANION GAP SERPL CALCULATED.3IONS-SCNC: 9.5 MMOL/L (ref 5–15)
BUN SERPL-MCNC: 30 MG/DL (ref 8–23)
BUN/CREAT SERPL: 39.5 (ref 7–25)
CALCIUM SPEC-SCNC: 8.8 MG/DL (ref 8.6–10.5)
CHLORIDE SERPL-SCNC: 109 MMOL/L (ref 98–107)
CO2 SERPL-SCNC: 24.5 MMOL/L (ref 22–29)
CREAT SERPL-MCNC: 0.76 MG/DL (ref 0.57–1)
DEPRECATED RDW RBC AUTO: 48.6 FL (ref 37–54)
EGFRCR SERPLBLD CKD-EPI 2021: 87.1 ML/MIN/1.73
ERYTHROCYTE [DISTWIDTH] IN BLOOD BY AUTOMATED COUNT: 14.8 % (ref 12.3–15.4)
GLUCOSE SERPL-MCNC: 137 MG/DL (ref 65–99)
HCT VFR BLD AUTO: 26 % (ref 34–46.6)
HCT VFR BLD AUTO: 32.6 % (ref 34–46.6)
HCT VFR BLD AUTO: 33.4 % (ref 34–46.6)
HGB BLD-MCNC: 10.4 G/DL (ref 12–15.9)
HGB BLD-MCNC: 11 G/DL (ref 12–15.9)
HGB BLD-MCNC: 8.7 G/DL (ref 12–15.9)
INR PPP: 1.89 (ref 0.9–1.1)
INR PPP: 3.01 (ref 0.9–1.1)
INR PPP: 3.05 (ref 0.9–1.1)
MCH RBC QN AUTO: 29.6 PG (ref 26.6–33)
MCHC RBC AUTO-ENTMCNC: 32.9 G/DL (ref 31.5–35.7)
MCV RBC AUTO: 90 FL (ref 79–97)
PLATELET # BLD AUTO: 345 10*3/MM3 (ref 140–450)
PMV BLD AUTO: 10.9 FL (ref 6–12)
POTASSIUM SERPL-SCNC: 5 MMOL/L (ref 3.5–5.2)
PROTHROMBIN TIME: 22 SECONDS (ref 11.7–14.2)
PROTHROMBIN TIME: 31.6 SECONDS (ref 11.7–14.2)
PROTHROMBIN TIME: 32 SECONDS (ref 11.7–14.2)
RBC # BLD AUTO: 3.71 10*6/MM3 (ref 3.77–5.28)
SODIUM SERPL-SCNC: 143 MMOL/L (ref 136–145)
WBC NRBC COR # BLD: 12.84 10*3/MM3 (ref 3.4–10.8)

## 2023-01-22 PROCEDURE — 85027 COMPLETE CBC AUTOMATED: CPT | Performed by: INTERNAL MEDICINE

## 2023-01-22 PROCEDURE — 85014 HEMATOCRIT: CPT | Performed by: INTERNAL MEDICINE

## 2023-01-22 PROCEDURE — 0 PHYTONADIONE 10 MG/ML SOLUTION 1 ML VIAL: Performed by: INTERNAL MEDICINE

## 2023-01-22 PROCEDURE — 85610 PROTHROMBIN TIME: CPT | Performed by: INTERNAL MEDICINE

## 2023-01-22 PROCEDURE — 85018 HEMOGLOBIN: CPT | Performed by: INTERNAL MEDICINE

## 2023-01-22 PROCEDURE — 99222 1ST HOSP IP/OBS MODERATE 55: CPT | Performed by: INTERNAL MEDICINE

## 2023-01-22 PROCEDURE — 36430 TRANSFUSION BLD/BLD COMPNT: CPT

## 2023-01-22 PROCEDURE — 86927 PLASMA FRESH FROZEN: CPT

## 2023-01-22 PROCEDURE — 36415 COLL VENOUS BLD VENIPUNCTURE: CPT | Performed by: INTERNAL MEDICINE

## 2023-01-22 PROCEDURE — 80048 BASIC METABOLIC PNL TOTAL CA: CPT | Performed by: INTERNAL MEDICINE

## 2023-01-22 PROCEDURE — 25010000002 CEFTRIAXONE PER 250 MG: Performed by: INTERNAL MEDICINE

## 2023-01-22 PROCEDURE — 25010000002 OCTREOTIDE PER 25 MCG: Performed by: INTERNAL MEDICINE

## 2023-01-22 PROCEDURE — P9059 PLASMA, FRZ BETWEEN 8-24HOUR: HCPCS

## 2023-01-22 RX ORDER — SIMETHICONE 80 MG
80 TABLET,CHEWABLE ORAL 4 TIMES DAILY PRN
Status: DISCONTINUED | OUTPATIENT
Start: 2023-01-22 | End: 2023-01-23 | Stop reason: HOSPADM

## 2023-01-22 RX ADMIN — Medication 2000 UNITS: at 09:10

## 2023-01-22 RX ADMIN — Medication 1 TABLET: at 09:10

## 2023-01-22 RX ADMIN — CEFTRIAXONE SODIUM 1 G: 1 INJECTION, POWDER, FOR SOLUTION INTRAMUSCULAR; INTRAVENOUS at 21:23

## 2023-01-22 RX ADMIN — LEVOTHYROXINE SODIUM 50 MCG: 0.05 TABLET ORAL at 05:19

## 2023-01-22 RX ADMIN — PANTOPRAZOLE SODIUM 8 MG/HR: 40 INJECTION, POWDER, FOR SOLUTION INTRAVENOUS at 19:50

## 2023-01-22 RX ADMIN — PANTOPRAZOLE SODIUM 8 MG/HR: 40 INJECTION, POWDER, FOR SOLUTION INTRAVENOUS at 03:54

## 2023-01-22 RX ADMIN — OXYCODONE HYDROCHLORIDE AND ACETAMINOPHEN 500 MG: 500 TABLET ORAL at 09:10

## 2023-01-22 RX ADMIN — PANTOPRAZOLE SODIUM 8 MG/HR: 40 INJECTION, POWDER, FOR SOLUTION INTRAVENOUS at 09:10

## 2023-01-22 RX ADMIN — SIMETHICONE 80 MG: 80 TABLET, CHEWABLE ORAL at 01:21

## 2023-01-22 RX ADMIN — OCTREOTIDE ACETATE 50 MCG/HR: 500 INJECTION, SOLUTION INTRAVENOUS; SUBCUTANEOUS at 17:56

## 2023-01-22 RX ADMIN — Medication 1 CAPSULE: at 20:40

## 2023-01-22 RX ADMIN — SIMETHICONE 80 MG: 80 TABLET, CHEWABLE ORAL at 17:56

## 2023-01-22 RX ADMIN — SIMETHICONE 80 MG: 80 TABLET, CHEWABLE ORAL at 05:19

## 2023-01-22 RX ADMIN — PANTOPRAZOLE SODIUM 8 MG/HR: 40 INJECTION, POWDER, FOR SOLUTION INTRAVENOUS at 15:40

## 2023-01-22 RX ADMIN — PHYTONADIONE 5 MG: 10 INJECTION, EMULSION INTRAMUSCULAR; INTRAVENOUS; SUBCUTANEOUS at 14:04

## 2023-01-23 ENCOUNTER — ANESTHESIA (OUTPATIENT)
Dept: GASTROENTEROLOGY | Facility: HOSPITAL | Age: 68
DRG: 813 | End: 2023-01-23
Payer: MEDICARE

## 2023-01-23 ENCOUNTER — INPATIENT HOSPITAL (OUTPATIENT)
Dept: URBAN - METROPOLITAN AREA HOSPITAL 113 | Facility: HOSPITAL | Age: 68
End: 2023-01-23

## 2023-01-23 ENCOUNTER — APPOINTMENT (OUTPATIENT)
Dept: LAB | Facility: HOSPITAL | Age: 68
End: 2023-01-23
Payer: MEDICARE

## 2023-01-23 ENCOUNTER — ANESTHESIA EVENT (OUTPATIENT)
Dept: GASTROENTEROLOGY | Facility: HOSPITAL | Age: 68
DRG: 813 | End: 2023-01-23
Payer: MEDICARE

## 2023-01-23 VITALS
TEMPERATURE: 97.9 F | HEART RATE: 70 BPM | RESPIRATION RATE: 14 BRPM | DIASTOLIC BLOOD PRESSURE: 64 MMHG | BODY MASS INDEX: 25.79 KG/M2 | WEIGHT: 170.19 LBS | HEIGHT: 68 IN | OXYGEN SATURATION: 93 % | SYSTOLIC BLOOD PRESSURE: 108 MMHG

## 2023-01-23 VITALS — HEART RATE: 73 BPM | SYSTOLIC BLOOD PRESSURE: 95 MMHG | DIASTOLIC BLOOD PRESSURE: 51 MMHG | OXYGEN SATURATION: 98 %

## 2023-01-23 DIAGNOSIS — I86.4 GASTRIC VARICES: ICD-10-CM

## 2023-01-23 DIAGNOSIS — D50.0 IRON DEFICIENCY ANEMIA SECONDARY TO BLOOD LOSS (CHRONIC): ICD-10-CM

## 2023-01-23 DIAGNOSIS — I81 PORTAL VEIN THROMBOSIS: ICD-10-CM

## 2023-01-23 DIAGNOSIS — I82.890 ACUTE EMBOLISM AND THROMBOSIS OF OTHER SPECIFIED VEINS: ICD-10-CM

## 2023-01-23 DIAGNOSIS — K92.2 GASTROINTESTINAL HEMORRHAGE, UNSPECIFIED: ICD-10-CM

## 2023-01-23 LAB
ANION GAP SERPL CALCULATED.3IONS-SCNC: 4.6 MMOL/L (ref 5–15)
B PARAPERT DNA SPEC QL NAA+PROBE: NOT DETECTED
B PERT DNA SPEC QL NAA+PROBE: NOT DETECTED
BASOPHILS # BLD AUTO: 0.04 10*3/MM3 (ref 0–0.2)
BASOPHILS NFR BLD AUTO: 0.5 % (ref 0–1.5)
BH BB BLOOD EXPIRATION DATE: NORMAL
BH BB BLOOD TYPE BARCODE: 5100
BH BB BLOOD TYPE BARCODE: 9500
BH BB DISPENSE STATUS: NORMAL
BH BB PRODUCT CODE: NORMAL
BH BB UNIT NUMBER: NORMAL
BUN SERPL-MCNC: 25 MG/DL (ref 8–23)
BUN/CREAT SERPL: 37.3 (ref 7–25)
C PNEUM DNA NPH QL NAA+NON-PROBE: NOT DETECTED
CALCIUM SPEC-SCNC: 7.5 MG/DL (ref 8.6–10.5)
CHLORIDE SERPL-SCNC: 115 MMOL/L (ref 98–107)
CO2 SERPL-SCNC: 25.4 MMOL/L (ref 22–29)
CREAT SERPL-MCNC: 0.67 MG/DL (ref 0.57–1)
CROSSMATCH INTERPRETATION: NORMAL
DEPRECATED RDW RBC AUTO: 49.8 FL (ref 37–54)
DEPRECATED RDW RBC AUTO: 50.4 FL (ref 37–54)
EGFRCR SERPLBLD CKD-EPI 2021: 97.1 ML/MIN/1.73
EOSINOPHIL # BLD AUTO: 0.41 10*3/MM3 (ref 0–0.4)
EOSINOPHIL NFR BLD AUTO: 5.5 % (ref 0.3–6.2)
ERYTHROCYTE [DISTWIDTH] IN BLOOD BY AUTOMATED COUNT: 15.5 % (ref 12.3–15.4)
ERYTHROCYTE [DISTWIDTH] IN BLOOD BY AUTOMATED COUNT: 15.5 % (ref 12.3–15.4)
FLUAV SUBTYP SPEC NAA+PROBE: NOT DETECTED
FLUBV RNA ISLT QL NAA+PROBE: NOT DETECTED
GLUCOSE BLDC GLUCOMTR-MCNC: 115 MG/DL (ref 70–130)
GLUCOSE SERPL-MCNC: 107 MG/DL (ref 65–99)
HADV DNA SPEC NAA+PROBE: NOT DETECTED
HCOV 229E RNA SPEC QL NAA+PROBE: NOT DETECTED
HCOV HKU1 RNA SPEC QL NAA+PROBE: NOT DETECTED
HCOV NL63 RNA SPEC QL NAA+PROBE: NOT DETECTED
HCOV OC43 RNA SPEC QL NAA+PROBE: NOT DETECTED
HCT VFR BLD AUTO: 22.6 % (ref 34–46.6)
HCT VFR BLD AUTO: 24.7 % (ref 34–46.6)
HCT VFR BLD AUTO: 24.7 % (ref 34–46.6)
HCT VFR BLD AUTO: 26.6 % (ref 34–46.6)
HGB BLD-MCNC: 7.5 G/DL (ref 12–15.9)
HGB BLD-MCNC: 7.9 G/DL (ref 12–15.9)
HGB BLD-MCNC: 7.9 G/DL (ref 12–15.9)
HGB BLD-MCNC: 8.8 G/DL (ref 12–15.9)
HMPV RNA NPH QL NAA+NON-PROBE: NOT DETECTED
HPIV1 RNA ISLT QL NAA+PROBE: NOT DETECTED
HPIV2 RNA SPEC QL NAA+PROBE: NOT DETECTED
HPIV3 RNA NPH QL NAA+PROBE: NOT DETECTED
HPIV4 P GENE NPH QL NAA+PROBE: NOT DETECTED
IMM GRANULOCYTES # BLD AUTO: 0.06 10*3/MM3 (ref 0–0.05)
IMM GRANULOCYTES NFR BLD AUTO: 0.8 % (ref 0–0.5)
INR PPP: 1.46 (ref 0.9–1.1)
INR PPP: 1.57 (ref 0.9–1.1)
INR PPP: 1.72 (ref 0.9–1.1)
LYMPHOCYTES # BLD AUTO: 0.97 10*3/MM3 (ref 0.7–3.1)
LYMPHOCYTES NFR BLD AUTO: 13.1 % (ref 19.6–45.3)
M PNEUMO IGG SER IA-ACNC: NOT DETECTED
MCH RBC QN AUTO: 28.8 PG (ref 26.6–33)
MCH RBC QN AUTO: 29.9 PG (ref 26.6–33)
MCHC RBC AUTO-ENTMCNC: 32 G/DL (ref 31.5–35.7)
MCHC RBC AUTO-ENTMCNC: 33.2 G/DL (ref 31.5–35.7)
MCV RBC AUTO: 90 FL (ref 79–97)
MCV RBC AUTO: 90.1 FL (ref 79–97)
MONOCYTES # BLD AUTO: 0.49 10*3/MM3 (ref 0.1–0.9)
MONOCYTES NFR BLD AUTO: 6.6 % (ref 5–12)
NEUTROPHILS NFR BLD AUTO: 5.46 10*3/MM3 (ref 1.7–7)
NEUTROPHILS NFR BLD AUTO: 73.5 % (ref 42.7–76)
NRBC BLD AUTO-RTO: 0.1 /100 WBC (ref 0–0.2)
PLATELET # BLD AUTO: 237 10*3/MM3 (ref 140–450)
PLATELET # BLD AUTO: 301 10*3/MM3 (ref 140–450)
PMV BLD AUTO: 10.1 FL (ref 6–12)
PMV BLD AUTO: 10.6 FL (ref 6–12)
POTASSIUM SERPL-SCNC: 3.6 MMOL/L (ref 3.5–5.2)
PROTHROMBIN TIME: 17.9 SECONDS (ref 11.7–14.2)
PROTHROMBIN TIME: 19 SECONDS (ref 11.7–14.2)
PROTHROMBIN TIME: 20.4 SECONDS (ref 11.7–14.2)
RBC # BLD AUTO: 2.51 10*6/MM3 (ref 3.77–5.28)
RBC # BLD AUTO: 2.74 10*6/MM3 (ref 3.77–5.28)
RHINOVIRUS RNA SPEC NAA+PROBE: NOT DETECTED
RSV RNA NPH QL NAA+NON-PROBE: NOT DETECTED
SARS-COV-2 RNA NPH QL NAA+NON-PROBE: NOT DETECTED
SODIUM SERPL-SCNC: 145 MMOL/L (ref 136–145)
UNIT  ABO: NORMAL
UNIT  RH: NORMAL
WBC NRBC COR # BLD: 7.14 10*3/MM3 (ref 3.4–10.8)
WBC NRBC COR # BLD: 7.43 10*3/MM3 (ref 3.4–10.8)

## 2023-01-23 PROCEDURE — 85027 COMPLETE CBC AUTOMATED: CPT | Performed by: INTERNAL MEDICINE

## 2023-01-23 PROCEDURE — 36430 TRANSFUSION BLD/BLD COMPNT: CPT

## 2023-01-23 PROCEDURE — 0DJ08ZZ INSPECTION OF UPPER INTESTINAL TRACT, VIA NATURAL OR ARTIFICIAL OPENING ENDOSCOPIC: ICD-10-PCS | Performed by: INTERNAL MEDICINE

## 2023-01-23 PROCEDURE — 43235 EGD DIAGNOSTIC BRUSH WASH: CPT | Performed by: INTERNAL MEDICINE

## 2023-01-23 PROCEDURE — 85014 HEMATOCRIT: CPT | Performed by: INTERNAL MEDICINE

## 2023-01-23 PROCEDURE — 85610 PROTHROMBIN TIME: CPT | Performed by: INTERNAL MEDICINE

## 2023-01-23 PROCEDURE — 85018 HEMOGLOBIN: CPT | Performed by: INTERNAL MEDICINE

## 2023-01-23 PROCEDURE — 99232 SBSQ HOSP IP/OBS MODERATE 35: CPT | Performed by: INTERNAL MEDICINE

## 2023-01-23 PROCEDURE — 85025 COMPLETE CBC W/AUTO DIFF WBC: CPT | Performed by: INTERNAL MEDICINE

## 2023-01-23 PROCEDURE — 86900 BLOOD TYPING SEROLOGIC ABO: CPT

## 2023-01-23 PROCEDURE — P9059 PLASMA, FRZ BETWEEN 8-24HOUR: HCPCS

## 2023-01-23 PROCEDURE — P9016 RBC LEUKOCYTES REDUCED: HCPCS

## 2023-01-23 PROCEDURE — 82962 GLUCOSE BLOOD TEST: CPT

## 2023-01-23 PROCEDURE — 99291 CRITICAL CARE FIRST HOUR: CPT | Performed by: INTERNAL MEDICINE

## 2023-01-23 PROCEDURE — 0202U NFCT DS 22 TRGT SARS-COV-2: CPT | Performed by: INTERNAL MEDICINE

## 2023-01-23 PROCEDURE — 25010000002 PROPOFOL 10 MG/ML EMULSION: Performed by: ANESTHESIOLOGY

## 2023-01-23 PROCEDURE — 25010000002 OCTREOTIDE PER 25 MCG: Performed by: INTERNAL MEDICINE

## 2023-01-23 PROCEDURE — 86927 PLASMA FRESH FROZEN: CPT

## 2023-01-23 PROCEDURE — 80048 BASIC METABOLIC PNL TOTAL CA: CPT | Performed by: INTERNAL MEDICINE

## 2023-01-23 RX ORDER — POLYETHYLENE GLYCOL 3350 17 G/17G
0.5 POWDER, FOR SOLUTION ORAL EVERY 12 HOURS
Status: DISCONTINUED | OUTPATIENT
Start: 2023-01-23 | End: 2023-01-23 | Stop reason: HOSPADM

## 2023-01-23 RX ORDER — PROPOFOL 10 MG/ML
VIAL (ML) INTRAVENOUS CONTINUOUS PRN
Status: DISCONTINUED | OUTPATIENT
Start: 2023-01-23 | End: 2023-01-23 | Stop reason: SURG

## 2023-01-23 RX ORDER — PROPOFOL 10 MG/ML
VIAL (ML) INTRAVENOUS AS NEEDED
Status: DISCONTINUED | OUTPATIENT
Start: 2023-01-23 | End: 2023-01-23 | Stop reason: SURG

## 2023-01-23 RX ORDER — LANOLIN ALCOHOL/MO/W.PET/CERES
3 CREAM (GRAM) TOPICAL NIGHTLY PRN
Qty: 30 TABLET
Start: 2023-01-23

## 2023-01-23 RX ORDER — LIDOCAINE HYDROCHLORIDE 20 MG/ML
INJECTION, SOLUTION INFILTRATION; PERINEURAL AS NEEDED
Status: DISCONTINUED | OUTPATIENT
Start: 2023-01-23 | End: 2023-01-23 | Stop reason: SURG

## 2023-01-23 RX ADMIN — SODIUM CHLORIDE 100 ML/HR: 9 INJECTION, SOLUTION INTRAVENOUS at 01:19

## 2023-01-23 RX ADMIN — Medication 2000 UNITS: at 07:45

## 2023-01-23 RX ADMIN — LIDOCAINE HYDROCHLORIDE 100 MG: 20 INJECTION, SOLUTION INFILTRATION; PERINEURAL at 01:02

## 2023-01-23 RX ADMIN — OCTREOTIDE ACETATE 50 MCG/HR: 500 INJECTION, SOLUTION INTRAVENOUS; SUBCUTANEOUS at 03:26

## 2023-01-23 RX ADMIN — OCTREOTIDE ACETATE 50 MCG/HR: 500 INJECTION, SOLUTION INTRAVENOUS; SUBCUTANEOUS at 13:35

## 2023-01-23 RX ADMIN — PANTOPRAZOLE SODIUM 8 MG/HR: 40 INJECTION, POWDER, FOR SOLUTION INTRAVENOUS at 11:30

## 2023-01-23 RX ADMIN — PANTOPRAZOLE SODIUM 8 MG/HR: 40 INJECTION, POWDER, FOR SOLUTION INTRAVENOUS at 06:27

## 2023-01-23 RX ADMIN — Medication 130 MCG/KG/MIN: at 01:05

## 2023-01-23 RX ADMIN — PROPOFOL 80 MG: 10 INJECTION, EMULSION INTRAVENOUS at 01:02

## 2023-01-23 RX ADMIN — OXYCODONE HYDROCHLORIDE AND ACETAMINOPHEN 500 MG: 500 TABLET ORAL at 07:45

## 2023-01-23 RX ADMIN — PANTOPRAZOLE SODIUM 8 MG/HR: 40 INJECTION, POWDER, FOR SOLUTION INTRAVENOUS at 01:18

## 2023-01-23 RX ADMIN — Medication 1 TABLET: at 07:46

## 2023-01-23 RX ADMIN — NADOLOL 30 MG: 20 TABLET ORAL at 07:46

## 2023-01-23 RX ADMIN — LEVOTHYROXINE SODIUM 50 MCG: 0.05 TABLET ORAL at 07:56

## 2023-01-23 NOTE — ANESTHESIA PREPROCEDURE EVALUATION
" Anesthesia Evaluation     Patient summary reviewed and Nursing notes reviewed   no history of anesthetic complications:  NPO Solid Status: > 8 hours  NPO Liquid Status: > 2 hours           Airway   Mallampati: II  TM distance: >3 FB  Neck ROM: full  No difficulty expected  Dental      Pulmonary    Cardiovascular     PT is on anticoagulation therapy    (+) hypertension, DVT (splenic and mesenteric vein thrombosis),       Neuro/Psych  (+) numbness,    GI/Hepatic/Renal/Endo    (+)  hiatal hernia, GI bleeding , thyroid problem hypothyroidism    Musculoskeletal     Abdominal    Substance History      OB/GYN          Other   arthritis, blood dyscrasia anemia,     ROS/Med Hx Other: Hematochezia  Portal HTN with esophageal varicies    On coumadin, FFP ordered by Dr. Mcmanus    pRBC currently infusing    Hgb 8.6                      Anesthesia Plan    ASA 4 - emergent     MAC     (I have reviewed the patient's history and chart with the patient, including all pertinent laboratory results and imaging. I have explained the risks of anesthesia including but not limited to dental damage, corneal abrasion, nerve injury, MI, stroke, aspiration, and death. Patient has agreed to proceed.     /69   Pulse 68   Temp 36.5 °C (97.7 °F) (Oral)   Resp 12   Ht 172.7 cm (68\")   Wt 77.2 kg (170 lb 3.1 oz)   SpO2 95%   BMI 25.88 kg/m²   )  intravenous induction     Anesthetic plan, risks, benefits, and alternatives have been provided, discussed and informed consent has been obtained with: patient.        CODE STATUS:    Code Status (Patient has no pulse and is not breathing): CPR (Attempt to Resuscitate)  Medical Interventions (Patient has pulse or is breathing): Full      "

## 2023-01-23 NOTE — ANESTHESIA POSTPROCEDURE EVALUATION
Patient: Blanka Mueller    Procedure Summary     Date: 01/23/23 Room / Location:  [facility]    Anesthesia Start: 0054 Anesthesia Stop: 0114    Procedure: ESOPHAGOGASTRODUODENOSCOPY AT BEDSIDE (Left: Esophagus) Diagnosis:     Surgeons: Livia Mcmanus MD Provider: Coty Gipson MD    Anesthesia Type: MAC ASA Status: 4 - Emergent          Anesthesia Type: MAC    Vitals  Vitals Value Taken Time   /63 01/23/23 0122   Temp 36 °C (96.8 °F) 01/23/23 0120   Pulse 68 01/23/23 0130   Resp 12 01/23/23 0120   SpO2 95 % 01/23/23 0130   Vitals shown include unvalidated device data.        Post Anesthesia Care and Evaluation    Patient location during evaluation: bedside  Patient participation: complete - patient participated  Level of consciousness: awake and alert  Pain management: adequate    Airway patency: patent  Anesthetic complications: No anesthetic complications  PONV Status: controlled  Cardiovascular status: acceptable  Respiratory status: acceptable  Hydration status: acceptable

## 2023-01-31 ENCOUNTER — APPOINTMENT (OUTPATIENT)
Dept: OTHER | Facility: HOSPITAL | Age: 68
End: 2023-01-31
Payer: MEDICARE

## 2023-02-22 ENCOUNTER — TELEPHONE (OUTPATIENT)
Dept: ONCOLOGY | Facility: CLINIC | Age: 68
End: 2023-02-22
Payer: MEDICARE

## 2023-02-22 DIAGNOSIS — I82.452 ACUTE DEEP VEIN THROMBOSIS (DVT) OF LEFT PERONEAL VEIN: Primary | ICD-10-CM

## 2023-02-22 DIAGNOSIS — R79.9 ABNORMAL FINDING OF BLOOD CHEMISTRY, UNSPECIFIED: ICD-10-CM

## 2023-02-22 NOTE — TELEPHONE ENCOUNTER
Patient stated that she was discharged from the hospital last Sunday. They have stopped her Coumadin and started her on Eliquis 5mg BID. Patient was recommended to go to [facility] but since patient is established with Dr Singh, she wants to continue with Dr Singh. Patient was supposed to see Dr Singh last January but she was admitted. Patient made aware that Dr Singh will be back in the office tomorrow and will call her back.    02/23/22 Dr Singh made aware. Patient to be seen in the office in about 2 weeks with a CBC and CMP. Called patient but no answer. LVM. Coordinating with .

## 2023-02-22 NOTE — TELEPHONE ENCOUNTER
Caller: Blanka Mueller    Relationship: Self    Best call back number: [phone]    What was the call regarding: PATIENT CALLED WANTED TO LET DR JOHNSON KNOW THAT SHE HAD BEEN IN [facility] FOR 3 WEEKS FOR GI BLEED. SHE WOULD LIKE TO CONTINUE TO SEE DR JOHNSON AND JUST WANTED TO MAKE SURE HE WAS OKAY TO CONTINUE HER CARE    Do you require a callback: YES

## 2023-02-24 ENCOUNTER — TELEPHONE (OUTPATIENT)
Dept: ONCOLOGY | Facility: CLINIC | Age: 68
End: 2023-02-24
Payer: MEDICARE

## 2023-02-24 NOTE — TELEPHONE ENCOUNTER
"----- Message from Marc Singh MD sent at 2/23/2023 10:22 AM EST -----  OK but no rush. CBC, CMP   ----- Message -----  From: Urszula Washburn RN  Sent: 2/23/2023   9:51 AM EST  To: MD Dr Francisco Vasquez,    Patient was supposed to see you last January but she was admitted. She recently got admitted at [facility] and was discharged from the hospital last Sunday. From Care Everywhere I saw this note:     \"Result Encounter Note - Navid Mcknight MD - 02/09/2023 12:00 PM EST  Contacted patient about her lab work and elevated white count at 23,600. Recommend that she go to the emergency room for CT scan of the abdomen and repeat blood work. Patient wanted to go to [place] first and does understand she may be transferred to [facility] if needed.. Contacted the emergency room at [place] and spoke with Dr. Beebe and briefed him on patient's history\"     They have stopped her Coumadin and started her on Eliquis 5mg BID. Patient was recommended to go to [facility] but since patient is established with you, she wants to schedule a follow up with you. OK to schedule within 1 week or 2? Any other labs aside from CBC? Thank you!      "

## 2023-03-21 ENCOUNTER — OFFICE VISIT (OUTPATIENT)
Dept: ONCOLOGY | Facility: CLINIC | Age: 68
End: 2023-03-21
Payer: MEDICARE

## 2023-03-21 ENCOUNTER — LAB (OUTPATIENT)
Dept: OTHER | Facility: HOSPITAL | Age: 68
End: 2023-03-21
Payer: MEDICARE

## 2023-03-21 VITALS
HEART RATE: 76 BPM | RESPIRATION RATE: 16 BRPM | BODY MASS INDEX: 22.19 KG/M2 | HEIGHT: 68 IN | DIASTOLIC BLOOD PRESSURE: 68 MMHG | OXYGEN SATURATION: 98 % | WEIGHT: 146.4 LBS | SYSTOLIC BLOOD PRESSURE: 102 MMHG | TEMPERATURE: 98 F

## 2023-03-21 DIAGNOSIS — I82.452 ACUTE DEEP VEIN THROMBOSIS (DVT) OF LEFT PERONEAL VEIN: Primary | ICD-10-CM

## 2023-03-21 DIAGNOSIS — K90.9 MALABSORPTION OF IRON: ICD-10-CM

## 2023-03-21 DIAGNOSIS — I82.890 SPLENIC VEIN THROMBOSIS: ICD-10-CM

## 2023-03-21 DIAGNOSIS — D62 ACUTE BLOOD LOSS ANEMIA: ICD-10-CM

## 2023-03-21 DIAGNOSIS — D73.5 SPLENIC INFARCTION: ICD-10-CM

## 2023-03-21 DIAGNOSIS — D68.59 THROMBOPHILIA: ICD-10-CM

## 2023-03-21 DIAGNOSIS — Q89.01 FUNCTIONAL ASPLENIA: ICD-10-CM

## 2023-03-21 DIAGNOSIS — D50.0 IRON DEFICIENCY ANEMIA DUE TO CHRONIC BLOOD LOSS: ICD-10-CM

## 2023-03-21 DIAGNOSIS — I85.00 PORTAL HYPERTENSION WITH ESOPHAGEAL VARICES: ICD-10-CM

## 2023-03-21 DIAGNOSIS — D75.839 THROMBOCYTOSIS: ICD-10-CM

## 2023-03-21 DIAGNOSIS — I82.452 ACUTE DEEP VEIN THROMBOSIS (DVT) OF LEFT PERONEAL VEIN: ICD-10-CM

## 2023-03-21 DIAGNOSIS — D72.829 LEUKOCYTOSIS, UNSPECIFIED TYPE: ICD-10-CM

## 2023-03-21 DIAGNOSIS — R79.9 ABNORMAL FINDING OF BLOOD CHEMISTRY, UNSPECIFIED: ICD-10-CM

## 2023-03-21 DIAGNOSIS — K92.2: ICD-10-CM

## 2023-03-21 DIAGNOSIS — K76.6 PORTAL HYPERTENSION WITH ESOPHAGEAL VARICES: ICD-10-CM

## 2023-03-21 PROBLEM — D73.0 FUNCTIONAL ASPLENIA: Status: ACTIVE | Noted: 2023-03-21

## 2023-03-21 LAB
ALBUMIN SERPL-MCNC: 3 G/DL (ref 3.5–5.2)
ALBUMIN/GLOB SERPL: 0.6 G/DL
ALP SERPL-CCNC: 144 U/L (ref 39–117)
ALT SERPL W P-5'-P-CCNC: 11 U/L (ref 1–33)
ANION GAP SERPL CALCULATED.3IONS-SCNC: 8 MMOL/L (ref 5–15)
AST SERPL-CCNC: 20 U/L (ref 1–32)
BASOPHILS # BLD AUTO: 0.09 10*3/MM3 (ref 0–0.2)
BASOPHILS NFR BLD AUTO: 0.8 % (ref 0–1.5)
BILIRUB SERPL-MCNC: 0.5 MG/DL (ref 0–1.2)
BUN SERPL-MCNC: 20 MG/DL (ref 8–23)
BUN/CREAT SERPL: 29.4 (ref 7–25)
CALCIUM SPEC-SCNC: 8.7 MG/DL (ref 8.6–10.5)
CHLORIDE SERPL-SCNC: 105 MMOL/L (ref 98–107)
CO2 SERPL-SCNC: 26 MMOL/L (ref 22–29)
CREAT SERPL-MCNC: 0.68 MG/DL (ref 0.57–1)
DEPRECATED RDW RBC AUTO: 56.7 FL (ref 37–54)
EGFRCR SERPLBLD CKD-EPI 2021: 96.8 ML/MIN/1.73
EOSINOPHIL # BLD AUTO: 0.48 10*3/MM3 (ref 0–0.4)
EOSINOPHIL NFR BLD AUTO: 4.2 % (ref 0.3–6.2)
ERYTHROCYTE [DISTWIDTH] IN BLOOD BY AUTOMATED COUNT: 18.9 % (ref 12.3–15.4)
FERRITIN SERPL-MCNC: 138.9 NG/ML (ref 13–150)
GLOBULIN UR ELPH-MCNC: 5.2 GM/DL
GLUCOSE SERPL-MCNC: 93 MG/DL (ref 65–99)
HCT VFR BLD AUTO: 29.2 % (ref 34–46.6)
HGB BLD-MCNC: 8.6 G/DL (ref 12–15.9)
HGB RETIC QN AUTO: 23.4 PG (ref 29.8–36.1)
IMM GRANULOCYTES # BLD AUTO: 0.07 10*3/MM3 (ref 0–0.05)
IMM GRANULOCYTES NFR BLD AUTO: 0.6 % (ref 0–0.5)
IMM RETICS NFR: 30.6 % (ref 3–15.8)
IRON 24H UR-MRATE: 19 MCG/DL (ref 37–145)
IRON SATN MFR SERPL: 6 % (ref 20–50)
LYMPHOCYTES # BLD AUTO: 1.38 10*3/MM3 (ref 0.7–3.1)
LYMPHOCYTES NFR BLD AUTO: 11.9 % (ref 19.6–45.3)
MCH RBC QN AUTO: 24.6 PG (ref 26.6–33)
MCHC RBC AUTO-ENTMCNC: 29.5 G/DL (ref 31.5–35.7)
MCV RBC AUTO: 83.4 FL (ref 79–97)
MONOCYTES # BLD AUTO: 1.22 10*3/MM3 (ref 0.1–0.9)
MONOCYTES NFR BLD AUTO: 10.6 % (ref 5–12)
NEUTROPHILS NFR BLD AUTO: 71.9 % (ref 42.7–76)
NEUTROPHILS NFR BLD AUTO: 8.31 10*3/MM3 (ref 1.7–7)
NRBC BLD AUTO-RTO: 0 /100 WBC (ref 0–0.2)
PLATELET # BLD AUTO: 682 10*3/MM3 (ref 140–450)
PMV BLD AUTO: 9.4 FL (ref 6–12)
POTASSIUM SERPL-SCNC: 4.6 MMOL/L (ref 3.5–5.2)
PROT SERPL-MCNC: 8.2 G/DL (ref 6–8.5)
RBC # BLD AUTO: 3.5 10*6/MM3 (ref 3.77–5.28)
RETICS # AUTO: 0.05 10*6/MM3 (ref 0.02–0.13)
RETICS/RBC NFR AUTO: 1.32 % (ref 0.7–1.9)
SODIUM SERPL-SCNC: 139 MMOL/L (ref 136–145)
TIBC SERPL-MCNC: 294 MCG/DL (ref 298–536)
TRANSFERRIN SERPL-MCNC: 197 MG/DL (ref 200–360)
WBC NRBC COR # BLD: 11.55 10*3/MM3 (ref 3.4–10.8)

## 2023-03-21 PROCEDURE — 85046 RETICYTE/HGB CONCENTRATE: CPT | Performed by: INTERNAL MEDICINE

## 2023-03-21 PROCEDURE — 80053 COMPREHEN METABOLIC PANEL: CPT | Performed by: INTERNAL MEDICINE

## 2023-03-21 PROCEDURE — 1126F AMNT PAIN NOTED NONE PRSNT: CPT | Performed by: INTERNAL MEDICINE

## 2023-03-21 PROCEDURE — 85025 COMPLETE CBC W/AUTO DIFF WBC: CPT | Performed by: INTERNAL MEDICINE

## 2023-03-21 PROCEDURE — 99215 OFFICE O/P EST HI 40 MIN: CPT | Performed by: INTERNAL MEDICINE

## 2023-03-21 PROCEDURE — 83540 ASSAY OF IRON: CPT | Performed by: INTERNAL MEDICINE

## 2023-03-21 PROCEDURE — 84466 ASSAY OF TRANSFERRIN: CPT | Performed by: INTERNAL MEDICINE

## 2023-03-21 PROCEDURE — 82728 ASSAY OF FERRITIN: CPT | Performed by: INTERNAL MEDICINE

## 2023-03-21 PROCEDURE — 36415 COLL VENOUS BLD VENIPUNCTURE: CPT

## 2023-03-21 RX ORDER — CALCIUM CARBONATE 200(500)MG
1 TABLET,CHEWABLE ORAL DAILY
COMMUNITY

## 2023-03-21 RX ORDER — SODIUM CHLORIDE 9 MG/ML
250 INJECTION, SOLUTION INTRAVENOUS ONCE
Status: CANCELLED | OUTPATIENT
Start: 2023-03-30

## 2023-03-21 RX ORDER — APIXABAN 5 MG/1
TABLET, FILM COATED ORAL
COMMUNITY
Start: 2023-03-16

## 2023-03-21 RX ORDER — LANOLIN ALCOHOL/MO/W.PET/CERES
CREAM (GRAM) TOPICAL
COMMUNITY
Start: 2023-02-28

## 2023-03-21 RX ORDER — SODIUM CHLORIDE 9 MG/ML
250 INJECTION, SOLUTION INTRAVENOUS ONCE
Status: CANCELLED | OUTPATIENT
Start: 2023-04-06

## 2023-03-21 NOTE — PROGRESS NOTES
Subjective     REASON FOR FOLLOW UP:   1.  History of extensive portal vein thrombosis 20 years ago.    2.  Portal hypertension causing esophageal and rectal varices  3.  Severe GI bleeding from varices  4.  Blood loss anemia  5.  Iron deficiency anemia from chronic GI blood loss  6.  Interventions for severe GI bleeding at [facility] in February 2023 including endoscopic banding of esophageal and rectal varices 1/27/2023 and splenic artery embolization by interventional radiology 2/1/2023  7.  Pulmonary emboli on CT angiogram of the chest to 1023  8.  Anticoagulation with Eliquis 5 mg p.o. twice daily  9.  Lack of response to oral iron therapy due to malabsorption  10.  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 year old female who was referred to us from her primary care physician due to a history of extensive portal vein and splenic vein thrombus about 20 years ago.  She has been on anticoagulation ever since with Coumadin.  She had been evaluated at the [facility] in the past and was negative for factor V Leiden mutation or prothrombin gene mutation.  Her IgG and IgM anticardiolipin antibodies were also negative.    She had recently undergone a Doppler study on lower extremities 12/19/2022 and was found to have a peroneal vein DVT in the left lower extremity.  Upon review of some of her recent coag studies she has had INRs that have been subtherapeutic at times but she has remained on Coumadin.  This clot occurred in the setting of recent surgery on the left foot for osteophyte.    INTERVAL HISTORY:  After our initial consult visit of 1/17/2023 the patient developed severe GI bleeding and has had multiple hospitalizations.  She presented to [facility] for admission 1/21/2023 with bright red blood per rectum and at that time she was anticoagulated with Coumadin.  She received fresh frozen plasma and  vitamin K and was transfused with red blood cells.  She underwent an EGD performed by Dr. Livia Mccoy of gastroenterology with no obvious bleeding seen.    It was felt that she likely had bleeding from rectal varices and decision was made to transfer the patient to [facility] for further intervention.  She underwent endoscopic banding of both esophageal varices and rectal varices performed by Dr. Hernandez on 1/27/2023.  She then underwent interventional radiology splenic artery embolization on 2/1/2023.    She was discharged from the hospital but developed chest pain and was found to have pulmonary emboli on CT angiogram of the chest 2/10/2023 and was readmitted to [facility] from 2/10/2023 to 2/14/2023.  She was transition from Coumadin to Eliquis and seems to be tolerating the Eliquis well.    This is her first visit back to our office following these multiple admissions.  She remains anemic with a hemoglobin of 8.6 and spite of taking oral iron 3 times a day.  Her iron studies from our office today show an iron saturation of 6%.  We will be ordering IV iron therapy with 2 doses of IV Injectafer 750 mg each delivered 1 week apart.  History of Present Illness     Past Medical History:   Diagnosis Date   • Acute torn meniscus     Right   • Ankle sprain 1967    many times as a kid   • Arthritis     DEGENERATIVE HANDS AND FEET   • Arthritis of back    • Arthritis of neck 2012    femoral neck in osteopenic range   • CTS (carpal tunnel syndrome) 2011    or degenerative arthritis   • Difficulty urinating    • Dry eyes    • Eczema    • Esophageal varices (HCC)    • Fracture, foot 2021    not a fracture but you provide no space for my feet problem of arthritus/bunions/constant pain/flat footed   • Gastritis    • Great toe pain    • Hiatal hernia    • History of dizziness    • Hypertension    • Knee swelling 2020   • Legal blindness     LEFT EYE   • Low back strain 1980    not as bad now   • Mesenteric venous  thrombosis (HCC)     PORTAL AND SPLENIC   • Pain     ABD WITH CERTAIN FOOD   • Pelvic pressure in female     WITH CERTAIN MOVEMENTS AND WHEN SITTING   • Portal hypertension (HCC)    • Portal vein thrombosis    • Splenic vein thrombosis    • Splenomegaly    • Tear of meniscus of knee 2020    R knee osteoarthritis/torn Mesiscus 2 places   • Thyroid disease    • Ureter, stricture     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 AND BEING FOLLOWED BY MICHELLE DESIR MD        Past Surgical History:   Procedure Laterality Date   • APPENDECTOMY N/A 1985   • COLONOSCOPY N/A 2019   • CYSTOSCOPY Bilateral 10/14/2020    Procedure: CYSTOSCOPY RETROGRADE PYELOGRAM;  Surgeon: Jaiden Desir MD;  Location: [facility] OR;  Service: Urology;  Laterality: Bilateral;   • CYSTOSCOPY W/ URETERAL STENT REMOVAL     • ENDOSCOPY N/A 2019   • ENDOSCOPY Left 1/23/2023    Procedure: ESOPHAGOGASTRODUODENOSCOPY AT BEDSIDE;  Surgeon: Livia Mcmanus MD;  Location: [facility] ENDOSCOPY;  Service: Gastroenterology;  Laterality: Left;  PRE- HEMATOCHEZIA, ESOPHAGEAL VARICES, GASTRIC VARIX   • EXPLORATORY LAPAROTOMY N/A 2001   • EXPLORATORY LAPAROTOMY      ORIGINALLY LOOKING FOR OVARIAN CANCER, WAS NOT FOUND.  FOUND MESENTERIC/PORTAL/SPLENIC VENOUS THROMBOSIS   • FOOT FUSION Left 10/25/2022    Procedure: Left first metatarsal phalangeal joint fusion;  Surgeon: Ulises Fairchild MD;  Location: [facility];  Service: Orthopedics;  Laterality: Left;   • HERNIA REPAIR N/A    • INGUINAL HERNIA REPAIR Left 01/20/2021    Procedure: open ventral hernia repair with mesh;  Surgeon: Yoanna Jacobo MD;  Location: [facility] OR;  Service: General;  Laterality: Left;   • LAPAROSCOPIC CHOLECYSTECTOMY N/A 2001   • TONSILLECTOMY  1971   • URETERAL STENT INSERTION          Current Outpatient Medications on File Prior to Visit   Medication Sig Dispense Refill   • ascorbic acid (VITAMIN C) 500 MG tablet Take 1 tablet by mouth Daily. PT HOLDING FOR SURGERY     • calcium carbonate  (TUMS) 500 MG chewable tablet Chew 1 tablet Daily.     • Cholecalciferol (Vitamin D3) 50 MCG (2000 UT) capsule Take 1 capsule by mouth Daily.     • Eliquis 5 MG tablet tablet      • ferrous sulfate 325 (65 FE) MG EC tablet TAKE 1 TABLET BY MOUTH ONCE DAILY FOR 7 DAYS AND THEN 1 TWICE DAILY FOR 7 DAYS AND THEN 1 THREE TIMES DAILY (INCREASE IF TOLERATED)     • levothyroxine (SYNTHROID, LEVOTHROID) 50 MCG tablet Take 1 tablet by mouth Daily.     • melatonin 3 MG tablet Take 1 tablet by mouth At Night As Needed for Sleep. 30 tablet    • multivitamin (THERAGRAN) tablet tablet Take 1 tablet by mouth Daily. PT HOLDING FOR SURGERY     • nadolol (CORGARD) 20 MG tablet Take 1.5 tablets by mouth Every Morning.     • Probiotic Product (PROBIOTIC DAILY PO) Take 1 capsule by mouth Every Night.     • [DISCONTINUED] octreotide 500 mcg in sodium chloride 0.9 % 99 mL Infuse 50 mcg/hr into a venous catheter Continuous. (Patient not taking: Reported on 3/21/2023)     • [DISCONTINUED] pantoprazole (PROTONIX) 40 mg in 100 mL NS (VTB) Infuse 8 mg/hr into a venous catheter Continuous. Indications: Gastrointestinal (GI) Bleed (Patient not taking: Reported on 3/21/2023)       No current facility-administered medications on file prior to visit.        ALLERGIES:    Allergies   Allergen Reactions   • Contrast Dye (Echo Or Unknown Ct/Mr) Swelling     FACIAL   • Diclofenac Other (See Comments)     Bad heartburn causing pressure in the chest   • Aspartame Itching   • Morphine Headache   • Heparin Unknown - Low Severity     HIT 2/11 per Dr. Neetu Dai   • Lamisil [Terbinafine] Swelling     JOINT PAIN, REDNESS   • Dexamethasone GI Intolerance   • Prednisone Rash and GI Intolerance     CAN TAKE IF NEEDED        Social History     Socioeconomic History   • Marital status:    • Number of children: 3   Tobacco Use   • Smoking status: Never   • Smokeless tobacco: Never   Vaping Use   • Vaping Use: Never used   Substance and Sexual Activity   •  "Alcohol use: Yes     Comment: RARELY   • Drug use: Never   • Sexual activity: Never        Family History   Problem Relation Age of Onset   • Osteoporosis Mother    • Heart disease Father    • Tuberculosis Father    • Breast cancer Sister    • Cancer Sister         lymphnodes   • Cancer Sister         pallete   • Colon cancer Paternal Grandfather    • Malig Hyperthermia Neg Hx         Review of Systems   Constitutional: Positive for fatigue and unexpected weight change. Negative for activity change, appetite change, chills and fever.   HENT: Negative for hearing loss, mouth sores, nosebleeds, trouble swallowing and voice change.    Eyes: Negative for pain and visual disturbance.   Respiratory: Positive for cough. Negative for shortness of breath and wheezing.    Cardiovascular: Negative for chest pain and palpitations.   Gastrointestinal: Positive for abdominal distention and diarrhea. Negative for abdominal pain, constipation, nausea and vomiting.   Genitourinary: Negative for difficulty urinating, frequency, hematuria and urgency.   Musculoskeletal: Negative for arthralgias, back pain, joint swelling and neck pain.   Skin: Negative for rash.   Neurological: Positive for weakness. Negative for dizziness, seizures, syncope and headaches.   Hematological: Negative for adenopathy. Bruises/bleeds easily.   Psychiatric/Behavioral: Negative for behavioral problems and confusion. The patient is not nervous/anxious.         Objective     Vitals:    03/21/23 1552   BP: 102/68   Pulse: 76   Resp: 16   Temp: 98 °F (36.7 °C)   TempSrc: Temporal   SpO2: 98%   Weight: 66.4 kg (146 lb 6.4 oz)   Height: 172 cm (67.72\")   PainSc: 0-No pain     Current Status 3/21/2023   ECOG score 2       Physical Exam  Constitutional:       General: She is not in acute distress.     Appearance: She is well-developed. She is ill-appearing.   HENT:      Head: Normocephalic.   Eyes:      General: No scleral icterus.     Conjunctiva/sclera: " Conjunctivae normal.      Pupils: Pupils are equal, round, and reactive to light.   Neck:      Thyroid: No thyromegaly.      Vascular: No JVD.   Cardiovascular:      Rate and Rhythm: Normal rate and regular rhythm.      Heart sounds: No murmur heard.    No friction rub. No gallop.   Pulmonary:      Effort: Pulmonary effort is normal.      Breath sounds: Normal breath sounds. No wheezing or rales.   Abdominal:      General: There is distension.      Tenderness: There is abdominal tenderness.      Comments: Abdomen is firm and distended   Musculoskeletal:         General: No deformity. Normal range of motion.      Cervical back: Normal range of motion and neck supple.   Lymphadenopathy:      Cervical: No cervical adenopathy.   Skin:     General: Skin is warm and dry.      Findings: No erythema or rash.   Neurological:      Mental Status: She is alert and oriented to person, place, and time.      Cranial Nerves: No cranial nerve deficit.      Deep Tendon Reflexes: Reflexes are normal and symmetric.   Psychiatric:         Behavior: Behavior normal.         Judgment: Judgment normal.           RECENT LABS:  Hematology WBC   Date Value Ref Range Status   03/21/2023 11.55 (H) 3.40 - 10.80 10*3/mm3 Final     RBC   Date Value Ref Range Status   03/21/2023 3.50 (L) 3.77 - 5.28 10*6/mm3 Final     Hemoglobin   Date Value Ref Range Status   03/21/2023 8.6 (L) 12.0 - 15.9 g/dL Final     Hematocrit   Date Value Ref Range Status   03/21/2023 29.2 (L) 34.0 - 46.6 % Final     Platelets   Date Value Ref Range Status   03/21/2023 682 (H) 140 - 450 10*3/mm3 Final        Lab Results   Component Value Date    GLUCOSE 93 03/21/2023    CALCIUM 8.7 03/21/2023     03/21/2023    K 4.6 03/21/2023    CO2 26.0 03/21/2023     03/21/2023    BUN 20 03/21/2023    CREATININE 0.68 03/21/2023    EGFRIFNONA 79 01/13/2021    BCR 29.4 (H) 03/21/2023    ANIONGAP 8.0 03/21/2023       CT Chest Pulmonary Angiogram   2/10/2023 5:12 PM   IMPRESSION:    1.  Segmental pulmonary emboli are identified within the right upper and lower pulmonary arteries. No evidence of right heart strain.     2.  Small/moderate volume left-sided pleural fluid collection. There is bibasilar atelectasis, left side worse than right.     3.  Small low-attenuation left thyroid lobe nodule. If not previously assessed, nonemergent/outpatient thyroid sonography should be considered for complete evaluation.     4.  Please refer to same-day CT abdomen pelvis for characterization of abdominopelvic findings.     CT Abdomen Pelvis W   2/10/2023 5:12 PM   IMPRESSION:   1.  Interval changes from splenic artery embolization with embolization material along the lesser curvature of the stomach, near the splenic hilum, and into the splenic parenchyma. The spleen is enlarged with new geographic areas of hypodensity consistent with large splenic infarct.     2.  Moderate left-sided hydroureteronephrosis is unchanged.     3.  Portal vein, splenic vein, and superior mesenteric vein thrombosis is redemonstrated. Diffuse mesenteric edema is unchanged.     4.  Increased small volume ascites.     5.  Small left pleural effusion and adjacent opacity consistent with atelectasis.        VENOUS DOPPLER 12/19/2022  Interpretation Summary       •  Acute deep vein thrombosis in 1 of 2 paired left peroneal veins.  •  Chronic deep vein thrombosis in the left gastrocnemius vein.  •  Previous ablation of the left great saphenous vein in the thigh and proximal calf.  •  All other left sided veins appeared normal.      Assessment & Plan     1.  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  She was worked up at the [facility] at that time with no obvious thrombophilic defect.  She has been anticoagulated with Coumadin for about 20 years but has now been switched to Eliquis.  2.  Portal hypertension leading to esophageal and rectal varices.  3.  Extensive  GI bleeding from varices requiring intervention at [facility] including banding of esophageal varices and rectal varices.  4.  Splenic artery embolization by interventional radiology at [facility] 2/1/2023  5.  Anemia due to acute blood loss and iron deficiency.  6.  Failure to respond to oral iron therapy due to malabsorption  7.  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  8.  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  9.  Bilateral pulmonary emboli.  Patient will require long-term Eliquis anticoagulation.    PLAN  1.  We reviewed the extensive records from [facility] hospitalizations [facility].  2.  For now patient will remain on Eliquis 5 mg twice daily.  3.  Due to her anemia and iron deficiency with lack of response to oral iron therapy due to malabsorption she will be scheduled to receive 2 doses of IV iron with Injectafer 750 mg IV weekly x2.  4.  She will be scheduled for MD follow-up in 6 weeks to review response to IV iron with follow-up CBC, iron panel, and ferritin level and reticulocyte count.  5.  Patient will be following up with her gastroenterologist Dr. Hernandez for further endoscopic assessment in the near future.  We instructed her to hold her Eliquis for 2 days prior to the procedure and resume the Eliquis the evening of the procedure.      I spent over 60 minutes with over half of this time face-to-face with the patient and the rest of the time with extensive review of the voluminous records from 3 separate hospitalizations from [facility], [facility], and [facility].  Additional time was spent in planning and scheduling IV iron infusion and further follow-up appointments.

## 2023-03-24 ENCOUNTER — TELEPHONE (OUTPATIENT)
Dept: ONCOLOGY | Facility: CLINIC | Age: 68
End: 2023-03-24
Payer: MEDICARE

## 2023-03-24 NOTE — TELEPHONE ENCOUNTER
"  Caller: Blanka Mueller \"Anne Marie Holder\"    Relationship: Self    Best call back number:[phone]      What was the call regarding: PATIENT STATED HER INSURANCE APPROVED THE IRON INFUSIONS AND SHE WANTED TO KNOW IF SHE CAN GET IT NEXT WEEK. PATIENT WANTED ASAP. WOULD PATIENT ALSO NEED A ?       Do you require a callback: YES        "

## 2023-03-24 NOTE — TELEPHONE ENCOUNTER
"Called back and LVM that Injectafer has been approved and to call  if need to reschedule. IV Injectafer must be received within 30days of iron labs dated 3/21/23. Patient to call back if has further questions.     (FYI:\"INJECTAFER / APPROVED IN COMPLIANCE WITH CODES D50.0, AND K90.9\")   "

## 2023-03-27 ENCOUNTER — OFFICE VISIT (OUTPATIENT)
Dept: ORTHOPEDIC SURGERY | Facility: CLINIC | Age: 68
End: 2023-03-27
Payer: MEDICARE

## 2023-03-27 VITALS — TEMPERATURE: 97.1 F | BODY MASS INDEX: 22.13 KG/M2 | WEIGHT: 146 LBS | HEIGHT: 68 IN

## 2023-03-27 DIAGNOSIS — M20.12 HALLUX VALGUS OF LEFT FOOT: ICD-10-CM

## 2023-03-27 DIAGNOSIS — R52 PAIN: Primary | ICD-10-CM

## 2023-03-27 DIAGNOSIS — G57.92 NEURITIS OF LEFT FOOT: ICD-10-CM

## 2023-03-27 DIAGNOSIS — M19.072 ARTHRITIS OF FIRST METATARSOPHALANGEAL (MTP) JOINT OF LEFT FOOT: ICD-10-CM

## 2023-03-27 PROCEDURE — 73630 X-RAY EXAM OF FOOT: CPT | Performed by: ORTHOPAEDIC SURGERY

## 2023-03-27 PROCEDURE — 99213 OFFICE O/P EST LOW 20 MIN: CPT | Performed by: ORTHOPAEDIC SURGERY

## 2023-03-27 NOTE — PROGRESS NOTES
"Foot Follow Up      Patient: Blanka Mueller    YOB: 1955 67 y.o. female    Chief Complaints: Foot \"feels fine\"    History of Present Illness:patient follows up left first MTP fusion without calcaneal bone graft from 10/25/2022.       She was seen on 11/2/2022 and had been nonweightbearing and elevating and did fall off of her scooter hitting her foot that she thinks likely hit her arch.  She reported numbness and mild aching pain but was off pain medications only using Tylenol.  Pain  was rated at 1 out of 10.     She has a history of some chronic pes planus with intermittent discomfort in the inferolateral hindfoot.  She did come off her Coumadin for her surgery but had resumed it.     Her sutures were removed and Steri-Strips were applied and she was placed into a sterile dressing with forefoot and ankle bunion spica instructed to continue with elevation and nonweightbearing.     She was seen on 11/17/2022 stating that she was not having any pain but did fall off her scooter twice.  She had also been using her left heel to open a trash can in the kitchen.  She had no complaints of pain in the left great toe.     She seemed to be doing well at that point and was placed back into a forefoot and ankle bunion spica dressing and instructed continue nonweightbearing for another 10 days and then to start some partial foot flat weightbearing with her forefoot off  shoe and walker as she was anxious to \"start bearing weight\".  Counseled her to take things easily as she was \"ready to get going\".     She was seen on 12/5/2022 stating she had been using her walker out of the house for the previous 10 days but really did not go out much and had been using her forefoot off  shoe and still using her walker some in the house.  Her wrapping had come off and she tried to rewrap it herself.  She did not have her walker with her that day.  She reported some intermittent swelling in her left great toe " and compression hose were helping with that.  She reported the only time she had pain was when she pushed on her sheets with her toe.  Current pain was rated 0 out of 10.     We discussed treatment going forward and recommended that she continue with use of a walker and start progressing with weightbearing using a regular postop shoe rather than the forefoot offloading continue with compression hose.     She was seen on 12/19/2022 stating that she had been using her walker in the house but not really using it out of the house but using a scooter some.  She reported swelling in her left leg and foot and was on Coumadin and using compression hose.  She did have a history of clotting disorder with previous splenic vein thrombosis.  She did report that she has had some intermittent pain in her calf.     Given the concern for swelling she was instructed continue with compression hose and sent for Doppler.  She was instructed to continue with her postoperative shoe and walker and to begin some therapy working on gait training.     Her Doppler did show an acute thrombosis in one of her peroneal veins and chronic thrombosis in her left gastroc vein.  My assistant Ying spoke with her PCP and patient has subsequently spoke with her PCP in her was referred to hematology and to a vascular specialist.    Seen on 1/9/2023.     The hematologist was downtown and requested referral to Dr. Johnston.  She was seeing Dr. Eloy Steele a vascular specialist with [facility] in about a week or so.     She reported that her swelling has diminished but does feel some tingling in her foot and ankle but was less when she uses her compression hose.  Of her own volition she stopped using her walker and crutches about 2 prior ago and came out of her postoperative shoe and was using a zip up sturdy soled type of boot.  Tennis shoes tended to rub some on the top of her foot.     She really did not have appreciable pain on the first MTP joint but  "initially had some discomfort in the ball of her foot which was improving.  Current pain was rated at 1 out of 10.    She was to been seen back in a month but has had some other medical problems she had a GI bleed with some varices and what sounds like some mesenteric or splenic embolisms. that she said was not related to her foot surgery and has been seeing Dr. Ayala who is also now following her according to her for her DVT and remains on Eliquis.  She did have an ultrasound at [facility] on 2/12/2023 which did not show any obvious DVT but she told me that Dr. Ayala told her that it was not the same type of scanner.    Regardless she said her left foot is continue to do well and feels fine\" not having any problems with it\".  Pain is rated 0 out of 10  HPI    ROS: No foot pain  Past Medical History:   Diagnosis Date   • Acute torn meniscus     Right   • Ankle sprain 1967    many times as a kid   • Arthritis     DEGENERATIVE HANDS AND FEET   • Arthritis of back    • Arthritis of neck 2012    femoral neck in osteopenic range   • CTS (carpal tunnel syndrome) 2011    or degenerative arthritis   • Difficulty urinating    • Dry eyes    • Eczema    • Esophageal varices (HCC)    • Fracture, foot 2021    not a fracture but you provide no space for my feet problem of arthritus/bunions/constant pain/flat footed   • Gastritis    • Great toe pain    • Hiatal hernia    • History of dizziness    • Hypertension    • Knee swelling 2020   • Legal blindness     LEFT EYE   • Low back strain 1980    not as bad now   • 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  On exam she has neutral alignment to the left first MTP joint incision is well-healed there is minimal swelling and no focal tenderness to palpation.      Radiology: 3 views left foot ordered evaluate postoperative alignment reviewed and compared to previous x-rays.  There remains good alignment to her left first MTP fusion hardware appears to be intact and without appreciable change compared with previous x-rays.    Doppler report reviewed from 12/19/2022 which indicates acute DVT in one of the 2 paired left peroneal veins and chronic DVT left gastroc vein.  There was evidence of previous ablation of left greater saphenous vein in the thigh and proximal calf.    Doppler ultrasound reviewed from [facility] from 2/12/ 23 which did not show any obvious DVT      Assessment/Plan1.  Status post left first MTP arthrodesis  2.  Left chronic pes planus  3.    Previous left acute peroneal DVT with chronic left gastroc DVT  4.  Left foot neuritis-improved    We discussed treatment going forward she is going to continue with Dr. Ayala for her DVT and other vascular pathology     Thankfully her foot seem to be doing well and not recommend any further surgical treatment or if she desired especially given the multiple vascular problems that she has had especially with having to require chronic anticoagulation    We will plan on seeing her back as needed and she told me how pleased she was with her outcome with a good job I had done and that she \"knew who to send people to\".  "

## 2023-03-30 ENCOUNTER — INFUSION (OUTPATIENT)
Dept: ONCOLOGY | Facility: HOSPITAL | Age: 68
End: 2023-03-30
Payer: MEDICARE

## 2023-03-30 VITALS
HEIGHT: 68 IN | SYSTOLIC BLOOD PRESSURE: 100 MMHG | WEIGHT: 147.8 LBS | HEART RATE: 73 BPM | OXYGEN SATURATION: 98 % | DIASTOLIC BLOOD PRESSURE: 72 MMHG | RESPIRATION RATE: 18 BRPM | BODY MASS INDEX: 22.4 KG/M2 | TEMPERATURE: 98 F

## 2023-03-30 DIAGNOSIS — D50.0 IRON DEFICIENCY ANEMIA DUE TO CHRONIC BLOOD LOSS: Primary | ICD-10-CM

## 2023-03-30 PROCEDURE — 63710000001 PROCHLORPERAZINE MALEATE PER 5 MG: Performed by: NURSE PRACTITIONER

## 2023-03-30 PROCEDURE — 25010000002 FERRIC CARBOXYMALTOSE 750 MG/15ML SOLUTION 15 ML VIAL: Performed by: INTERNAL MEDICINE

## 2023-03-30 PROCEDURE — 96374 THER/PROPH/DIAG INJ IV PUSH: CPT

## 2023-03-30 RX ORDER — SODIUM CHLORIDE 9 MG/ML
250 INJECTION, SOLUTION INTRAVENOUS ONCE
Status: COMPLETED | OUTPATIENT
Start: 2023-03-30 | End: 2023-03-30

## 2023-03-30 RX ORDER — PROCHLORPERAZINE MALEATE 5 MG/1
10 TABLET ORAL ONCE
Status: COMPLETED | OUTPATIENT
Start: 2023-03-30 | End: 2023-03-30

## 2023-03-30 RX ADMIN — SODIUM CHLORIDE 250 ML: 9 INJECTION, SOLUTION INTRAVENOUS at 14:08

## 2023-03-30 RX ADMIN — FERRIC CARBOXYMALTOSE INJECTION 750 MG: 50 INJECTION, SOLUTION INTRAVENOUS at 14:14

## 2023-03-30 RX ADMIN — PROCHLORPERAZINE MALEATE 10 MG: 5 TABLET ORAL at 14:08

## 2023-04-06 ENCOUNTER — INFUSION (OUTPATIENT)
Dept: ONCOLOGY | Facility: HOSPITAL | Age: 68
End: 2023-04-06
Payer: MEDICARE

## 2023-04-06 VITALS
TEMPERATURE: 97.3 F | SYSTOLIC BLOOD PRESSURE: 93 MMHG | BODY MASS INDEX: 22.04 KG/M2 | WEIGHT: 145.4 LBS | DIASTOLIC BLOOD PRESSURE: 55 MMHG | HEART RATE: 70 BPM | OXYGEN SATURATION: 96 % | HEIGHT: 68 IN | RESPIRATION RATE: 18 BRPM

## 2023-04-06 DIAGNOSIS — D50.0 IRON DEFICIENCY ANEMIA DUE TO CHRONIC BLOOD LOSS: Primary | ICD-10-CM

## 2023-04-06 PROCEDURE — 25010000002 FERRIC CARBOXYMALTOSE 750 MG/15ML SOLUTION 15 ML VIAL: Performed by: INTERNAL MEDICINE

## 2023-04-06 PROCEDURE — 96374 THER/PROPH/DIAG INJ IV PUSH: CPT

## 2023-04-06 RX ORDER — SODIUM CHLORIDE 9 MG/ML
250 INJECTION, SOLUTION INTRAVENOUS ONCE
Status: COMPLETED | OUTPATIENT
Start: 2023-04-06 | End: 2023-04-06

## 2023-04-06 RX ORDER — B-COMPLEX WITH VITAMIN C
200 TABLET ORAL
COMMUNITY

## 2023-04-06 RX ORDER — LANOLIN ALCOHOL/MO/W.PET/CERES
400 CREAM (GRAM) TOPICAL DAILY
COMMUNITY

## 2023-04-06 RX ORDER — PROCHLORPERAZINE MALEATE 5 MG/1
10 TABLET ORAL ONCE
Status: DISCONTINUED | OUTPATIENT
Start: 2023-04-06 | End: 2023-04-06 | Stop reason: HOSPADM

## 2023-04-06 RX ADMIN — FERRIC CARBOXYMALTOSE INJECTION 750 MG: 50 INJECTION, SOLUTION INTRAVENOUS at 14:47

## 2023-04-06 RX ADMIN — SODIUM CHLORIDE 250 ML: 9 INJECTION, SOLUTION INTRAVENOUS at 14:41

## 2023-04-11 ENCOUNTER — TELEPHONE (OUTPATIENT)
Dept: ONCOLOGY | Facility: CLINIC | Age: 68
End: 2023-04-11
Payer: MEDICARE

## 2023-04-11 NOTE — TELEPHONE ENCOUNTER
Called back patient and she stated she took her Eliquis at 7am. Patient's colonoscopy and endoscopy is on Thursday 4/13 08:15am. Dr Singh made aware and is okay. Patient made aware and v/u. Patient stated that Gastro decided to reschedule appt to 4/27 and wants her Eliquis held for 5days. Dr Singh updated.

## 2023-04-11 NOTE — TELEPHONE ENCOUNTER
"  Caller: Blanka Mueller \"Sydney Pierre\"    Relationship: Self    Best call back number: [phone]    What is the best time to reach you: ANY    Who are you requesting to speak with (clinical staff, provider,  specific staff member): CLINICAL    What was the call regarding: SYDNEY PIERRE IS HAVING A COLONOSCOPY AND ENDOSCOPY ON Thursday THIS WEEK.    DR JOHNSON TOLD HER TO DISCONTINUE HER Eliquis 5 MG tablet   TWO DAYS PRIOR TO PROCEDURE    SYDNEY PIERRE  ACCIDENTALLY TOOK IT THIS MORNING.    PLEASE CALL BACK TO ADVISE        Do you require a callback: YES           "

## 2023-05-08 ENCOUNTER — OFFICE VISIT (OUTPATIENT)
Dept: ONCOLOGY | Facility: CLINIC | Age: 68
End: 2023-05-08
Payer: MEDICARE

## 2023-05-08 ENCOUNTER — LAB (OUTPATIENT)
Dept: LAB | Facility: HOSPITAL | Age: 68
End: 2023-05-08
Payer: MEDICARE

## 2023-05-08 VITALS
WEIGHT: 151.1 LBS | TEMPERATURE: 98.2 F | BODY MASS INDEX: 22.9 KG/M2 | HEIGHT: 68 IN | OXYGEN SATURATION: 97 % | SYSTOLIC BLOOD PRESSURE: 98 MMHG | HEART RATE: 67 BPM | RESPIRATION RATE: 16 BRPM | DIASTOLIC BLOOD PRESSURE: 61 MMHG

## 2023-05-08 DIAGNOSIS — I82.452 ACUTE DEEP VEIN THROMBOSIS (DVT) OF LEFT PERONEAL VEIN: ICD-10-CM

## 2023-05-08 DIAGNOSIS — K92.2: ICD-10-CM

## 2023-05-08 DIAGNOSIS — D75.839 THROMBOCYTOSIS: ICD-10-CM

## 2023-05-08 DIAGNOSIS — I82.890 SPLENIC VEIN THROMBOSIS: ICD-10-CM

## 2023-05-08 DIAGNOSIS — D62 ACUTE BLOOD LOSS ANEMIA: ICD-10-CM

## 2023-05-08 DIAGNOSIS — K76.6 PORTAL HYPERTENSION WITH ESOPHAGEAL VARICES: ICD-10-CM

## 2023-05-08 DIAGNOSIS — D72.829 LEUKOCYTOSIS, UNSPECIFIED TYPE: ICD-10-CM

## 2023-05-08 DIAGNOSIS — K90.9 MALABSORPTION OF IRON: ICD-10-CM

## 2023-05-08 DIAGNOSIS — I85.00 PORTAL HYPERTENSION WITH ESOPHAGEAL VARICES: ICD-10-CM

## 2023-05-08 DIAGNOSIS — D75.838 OTHER THROMBOCYTOSIS: ICD-10-CM

## 2023-05-08 DIAGNOSIS — D50.0 IRON DEFICIENCY ANEMIA DUE TO CHRONIC BLOOD LOSS: Primary | ICD-10-CM

## 2023-05-08 DIAGNOSIS — D50.0 IRON DEFICIENCY ANEMIA DUE TO CHRONIC BLOOD LOSS: ICD-10-CM

## 2023-05-08 LAB
BASOPHILS # BLD AUTO: 0.09 10*3/MM3 (ref 0–0.2)
BASOPHILS NFR BLD AUTO: 1 % (ref 0–1.5)
DEPRECATED RDW RBC AUTO: 60.4 FL (ref 37–54)
EOSINOPHIL # BLD AUTO: 0.53 10*3/MM3 (ref 0–0.4)
EOSINOPHIL NFR BLD AUTO: 5.8 % (ref 0.3–6.2)
ERYTHROCYTE [DISTWIDTH] IN BLOOD BY AUTOMATED COUNT: 19.1 % (ref 12.3–15.4)
FERRITIN SERPL-MCNC: 638.6 NG/ML (ref 13–150)
HCT VFR BLD AUTO: 38.7 % (ref 34–46.6)
HGB BLD-MCNC: 11.9 G/DL (ref 12–15.9)
HGB RETIC QN AUTO: 31.7 PG (ref 29.8–36.1)
IMM GRANULOCYTES # BLD AUTO: 0.05 10*3/MM3 (ref 0–0.05)
IMM GRANULOCYTES NFR BLD AUTO: 0.6 % (ref 0–0.5)
IMM RETICS NFR: 12.6 % (ref 3–15.8)
IRON 24H UR-MRATE: 54 MCG/DL (ref 37–145)
IRON SATN MFR SERPL: 21 % (ref 14–48)
LYMPHOCYTES # BLD AUTO: 1.38 10*3/MM3 (ref 0.7–3.1)
LYMPHOCYTES NFR BLD AUTO: 15.2 % (ref 19.6–45.3)
MCH RBC QN AUTO: 27 PG (ref 26.6–33)
MCHC RBC AUTO-ENTMCNC: 30.7 G/DL (ref 31.5–35.7)
MCV RBC AUTO: 87.8 FL (ref 79–97)
MONOCYTES # BLD AUTO: 0.86 10*3/MM3 (ref 0.1–0.9)
MONOCYTES NFR BLD AUTO: 9.5 % (ref 5–12)
NEUTROPHILS NFR BLD AUTO: 6.17 10*3/MM3 (ref 1.7–7)
NEUTROPHILS NFR BLD AUTO: 67.9 % (ref 42.7–76)
NRBC BLD AUTO-RTO: 0 /100 WBC (ref 0–0.2)
PLATELET # BLD AUTO: 694 10*3/MM3 (ref 140–450)
PMV BLD AUTO: 9.7 FL (ref 6–12)
RBC # BLD AUTO: 4.41 10*6/MM3 (ref 3.77–5.28)
RETICS # AUTO: 0.06 10*6/MM3 (ref 0.02–0.13)
RETICS/RBC NFR AUTO: 1.35 % (ref 0.7–1.9)
TIBC SERPL-MCNC: 256 MCG/DL (ref 249–505)
TRANSFERRIN SERPL-MCNC: 183 MG/DL (ref 200–360)
WBC NRBC COR # BLD: 9.08 10*3/MM3 (ref 3.4–10.8)

## 2023-05-08 PROCEDURE — 83540 ASSAY OF IRON: CPT

## 2023-05-08 PROCEDURE — 85025 COMPLETE CBC W/AUTO DIFF WBC: CPT

## 2023-05-08 PROCEDURE — 84466 ASSAY OF TRANSFERRIN: CPT

## 2023-05-08 PROCEDURE — 36415 COLL VENOUS BLD VENIPUNCTURE: CPT

## 2023-05-08 PROCEDURE — 85046 RETICYTE/HGB CONCENTRATE: CPT

## 2023-05-08 PROCEDURE — 82728 ASSAY OF FERRITIN: CPT

## 2023-05-08 RX ORDER — HYDROXYZINE HYDROCHLORIDE 25 MG/1
25-50 TABLET, FILM COATED ORAL AS NEEDED
COMMUNITY
Start: 2023-03-28

## 2023-05-08 NOTE — PROGRESS NOTES
Subjective     REASON FOR FOLLOW UP:   1.  History of extensive portal vein thrombosis 20 years ago.    2.  Portal hypertension causing esophageal and rectal varices  3.  Severe GI bleeding from varices  4.  Blood loss anemia  5.  Iron deficiency anemia from chronic GI blood loss  6.  Interventions for severe GI bleeding at [facility] in February 2023 including endoscopic banding of esophageal and rectal varices 1/27/2023 and splenic artery embolization by interventional radiology 2/1/2023  7.  Pulmonary emboli on CT angiogram of the chest 2/10/2023  8.  Anticoagulation with Eliquis 5 mg p.o. twice daily  9.  Lack of response to oral iron therapy due to malabsorption  10.  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  IV iron therapy with 2 doses of Injectafer 750 mg each on 3/30/2023 and 4/6/2023      HISTORY OF PRESENT ILLNESS:  The patient is a 67 y.o. year old female who was referred to us from her primary care physician due to a history of extensive portal vein and splenic vein thrombus about 20 years ago.  She has been on anticoagulation ever since with Coumadin.  She had been evaluated at the [facility] in the past and was negative for factor V Leiden mutation or prothrombin gene mutation.  Her IgG and IgM anticardiolipin antibodies were also negative.    She had recently undergone a Doppler study on lower extremities 12/19/2022 and was found to have a peroneal vein DVT in the left lower extremity.  Upon review of some of her recent coag studies she has had INRs that have been subtherapeutic at times but she has remained on Coumadin.  This clot occurred in the setting of recent surgery on the left foot for osteophyte.    After our initial consult visit of 1/17/2023 the patient developed severe GI bleeding and has had multiple hospitalizations.  She presented to [facility] for admission 1/21/2023 with bright red blood per rectum and at that time she  was anticoagulated with Coumadin.  She received fresh frozen plasma and vitamin K and was transfused with red blood cells.  She underwent an EGD performed by Dr. Livia Mccoy of gastroenterology with no obvious bleeding seen.    It was felt that she likely had bleeding from rectal varices and decision was made to transfer the patient to [facility] for further intervention.  She underwent endoscopic banding of both esophageal varices and rectal varices performed by Dr. Hernandez on 1/27/2023.  She then underwent interventional radiology splenic artery embolization on 2/1/2023.    She was discharged from the hospital but developed chest pain and was found to have pulmonary emboli on CT angiogram of the chest 2/10/2023 and was readmitted to [facility] from 2/10/2023 to 2/14/2023.  She was transition from Coumadin to Eliquis and seems to be tolerating the Eliquis well.    INTERVAL HISTORY:  Her iron studies from the last office visit showed an iron saturation of 6%.  We scheduled IV iron therapy with 2 doses of IV Injectafer 750 mg each delivered 1 week apart.  She received the first dose on 3/30/2023 and the second dose on 4/6/2023 and tolerated it well. This is her first visit back since receiving IV iron.  Hemoglobin has significantly improved up to 11.9 g/dL.  Her repeat iron studies are pending.    She remains on Eliquis which she reports causes some itching.  She is able to tolerate it with the use of nightly hydroxyzine for itching.    Her platelet count remains elevated today at 694,000.  History of Present Illness     Past Medical History:   Diagnosis Date   • Acute torn meniscus     Right   • Ankle sprain 1967    many times as a kid   • Arthritis     DEGENERATIVE HANDS AND FEET   • Arthritis of back    • Arthritis of neck 2012    femoral neck in osteopenic range   • CTS (carpal tunnel syndrome) 2011    or degenerative arthritis   • Difficulty urinating    • Dry eyes    • Eczema    • Esophageal  varices    • Fracture, foot 2021    not a fracture but you provide no space for my feet problem of arthritus/bunions/constant pain/flat footed   • Gastritis    • Great toe pain    • Hiatal hernia    • History of dizziness    • Hypertension    • Knee swelling 2020   • Legal blindness     LEFT EYE   • Low back strain 1980    not as bad now   • Mesenteric venous thrombosis     PORTAL AND SPLENIC   • Pain     ABD WITH CERTAIN FOOD   • Pelvic pressure in female     WITH CERTAIN MOVEMENTS AND WHEN SITTING   • Portal hypertension    • Portal vein thrombosis    • Splenic vein thrombosis    • Splenomegaly    • Tear of meniscus of knee 2020    R knee osteoarthritis/torn Mesiscus 2 places   • Thyroid disease    • Ureter, stricture     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 AND BEING FOLLOWED BY MICHELLE DESIR MD        Past Surgical History:   Procedure Laterality Date   • APPENDECTOMY N/A 1985   • COLONOSCOPY N/A 2019   • CYSTOSCOPY Bilateral 10/14/2020    Procedure: CYSTOSCOPY RETROGRADE PYELOGRAM;  Surgeon: Jaiden Desir MD;  Location: [facility] OR;  Service: Urology;  Laterality: Bilateral;   • CYSTOSCOPY W/ URETERAL STENT REMOVAL     • ENDOSCOPY N/A 2019   • ENDOSCOPY Left 1/23/2023    Procedure: ESOPHAGOGASTRODUODENOSCOPY AT BEDSIDE;  Surgeon: Livia Mcmanus MD;  Location: [facility] ENDOSCOPY;  Service: Gastroenterology;  Laterality: Left;  PRE- HEMATOCHEZIA, ESOPHAGEAL VARICES, GASTRIC VARIX   • EXPLORATORY LAPAROTOMY N/A 2001   • EXPLORATORY LAPAROTOMY      ORIGINALLY LOOKING FOR OVARIAN CANCER, WAS NOT FOUND.  FOUND MESENTERIC/PORTAL/SPLENIC VENOUS THROMBOSIS   • FOOT FUSION Left 10/25/2022    Procedure: Left first metatarsal phalangeal joint fusion;  Surgeon: Ulises Fairchild MD;  Location: [facility] OR OSC;  Service: Orthopedics;  Laterality: Left;   • HERNIA REPAIR N/A    • INGUINAL HERNIA REPAIR Left 01/20/2021    Procedure: open ventral hernia repair with mesh;  Surgeon: Yoanna Jacobo MD;  Location: [facility] OR;   Service: General;  Laterality: Left;   • LAPAROSCOPIC CHOLECYSTECTOMY N/A 2001   • TONSILLECTOMY  1971   • URETERAL STENT INSERTION          Current Outpatient Medications on File Prior to Visit   Medication Sig Dispense Refill   • ascorbic acid (VITAMIN C) 500 MG tablet Take 2 tablets by mouth Daily. PT HOLDING FOR SURGERY     • calcium carbonate (TUMS) 500 MG chewable tablet Chew 1 tablet Daily.     • Cholecalciferol (Vitamin D3) 50 MCG (2000 UT) capsule Take 1 capsule by mouth Daily.     • Eliquis 5 MG tablet tablet Take 1 tablet by mouth Every 12 (Twelve) Hours.     • ferrous sulfate 325 (65 FE) MG EC tablet TAKE 1 TABLET BY MOUTH ONCE DAILY FOR 7 DAYS AND THEN 1 TWICE DAILY FOR 7 DAYS AND THEN 1 THREE TIMES DAILY (INCREASE IF TOLERATED)     • hydrOXYzine (ATARAX) 25 MG tablet Take 1-2 tablets by mouth As Needed.     • levothyroxine (SYNTHROID, LEVOTHROID) 50 MCG tablet Take 1 tablet by mouth Daily.     • Magnesium Oxide 400 (240 Mg) MG tablet Take 1 tablet by mouth Daily.     • melatonin 3 MG tablet Take 1 tablet by mouth At Night As Needed for Sleep. 30 tablet    • multivitamin (THERAGRAN) tablet tablet Take 1 tablet by mouth Daily. PT HOLDING FOR SURGERY     • nadolol (CORGARD) 20 MG tablet Take 1.5 tablets by mouth Every Morning.     • Probiotic Product (PROBIOTIC DAILY PO) Take 1 capsule by mouth Every Night.     • Zinc 100 MG tablet Take 200 tablet/day by mouth.       No current facility-administered medications on file prior to visit.        ALLERGIES:    Allergies   Allergen Reactions   • Contrast Dye (Echo Or Unknown Ct/Mr) Swelling     FACIAL   • Diclofenac Other (See Comments)     Bad heartburn causing pressure in the chest   • Aspartame Itching   • Morphine Headache   • Heparin Unknown - Low Severity     HIT 2/11 per Dr. Neetu Dai   • Lamisil [Terbinafine] Swelling     JOINT PAIN, REDNESS   • Dexamethasone GI Intolerance   • Prednisone Rash and GI Intolerance     CAN TAKE IF NEEDED        Social  "History     Socioeconomic History   • Marital status:    • Number of children: 3   Tobacco Use   • Smoking status: Never   • Smokeless tobacco: Never   Vaping Use   • Vaping Use: Never used   Substance and Sexual Activity   • Alcohol use: Yes     Comment: glass of wine a month with dinner   • Drug use: Never   • Sexual activity: Never        Family History   Problem Relation Age of Onset   • Osteoporosis Mother    • Heart disease Father    • Tuberculosis Father    • Breast cancer Sister    • Cancer Sister         lymphnodes   • Cancer Sister         pallete   • Colon cancer Paternal Grandfather    • Malig Hyperthermia Neg Hx         Review of Systems   Constitutional: Positive for fatigue and unexpected weight change. Negative for activity change, appetite change, chills and fever.   HENT: Negative for hearing loss, mouth sores, nosebleeds, trouble swallowing and voice change.    Eyes: Negative for pain and visual disturbance.   Respiratory: Positive for cough. Negative for shortness of breath and wheezing.    Cardiovascular: Negative for chest pain and palpitations.   Gastrointestinal: Positive for abdominal distention and diarrhea. Negative for abdominal pain, constipation, nausea and vomiting.   Genitourinary: Negative for difficulty urinating, frequency, hematuria and urgency.   Musculoskeletal: Negative for arthralgias, back pain, joint swelling and neck pain.   Skin: Negative for rash.   Neurological: Positive for weakness. Negative for dizziness, seizures, syncope and headaches.   Hematological: Negative for adenopathy. Bruises/bleeds easily.   Psychiatric/Behavioral: Negative for behavioral problems and confusion. The patient is not nervous/anxious.         Objective     Vitals:    05/08/23 1420   BP: 98/61   Pulse: 67   Resp: 16   Temp: 98.2 °F (36.8 °C)   TempSrc: Temporal   SpO2: 97%   Weight: 68.5 kg (151 lb 1.6 oz)   Height: 172.7 cm (67.99\")   PainSc: 0-No pain         5/8/2023     2:18 PM "   Current Status   ECOG score 0       Physical Exam  Constitutional:       General: She is not in acute distress.     Appearance: She is well-developed. She is ill-appearing.   HENT:      Head: Normocephalic.   Eyes:      General: No scleral icterus.     Conjunctiva/sclera: Conjunctivae normal.      Pupils: Pupils are equal, round, and reactive to light.   Neck:      Thyroid: No thyromegaly.      Vascular: No JVD.   Cardiovascular:      Rate and Rhythm: Normal rate and regular rhythm.      Heart sounds: No murmur heard.    No friction rub. No gallop.   Pulmonary:      Effort: Pulmonary effort is normal.      Breath sounds: Normal breath sounds. No wheezing or rales.   Abdominal:      General: There is distension.      Tenderness: There is abdominal tenderness.      Comments: Abdomen is firm and distended   Musculoskeletal:         General: No deformity. Normal range of motion.      Cervical back: Normal range of motion and neck supple.   Lymphadenopathy:      Cervical: No cervical adenopathy.   Skin:     General: Skin is warm and dry.      Findings: No erythema or rash.   Neurological:      Mental Status: She is alert and oriented to person, place, and time.      Cranial Nerves: No cranial nerve deficit.      Deep Tendon Reflexes: Reflexes are normal and symmetric.   Psychiatric:         Behavior: Behavior normal.         Judgment: Judgment normal.           RECENT LABS:  Hematology WBC   Date Value Ref Range Status   05/08/2023 9.08 3.40 - 10.80 10*3/mm3 Final     RBC   Date Value Ref Range Status   05/08/2023 4.41 3.77 - 5.28 10*6/mm3 Final     Hemoglobin   Date Value Ref Range Status   05/08/2023 11.9 (L) 12.0 - 15.9 g/dL Final     Hematocrit   Date Value Ref Range Status   05/08/2023 38.7 34.0 - 46.6 % Final     Platelets   Date Value Ref Range Status   05/08/2023 694 (H) 140 - 450 10*3/mm3 Final        Lab Results   Component Value Date    GLUCOSE 93 03/21/2023    CALCIUM 8.7 03/21/2023     03/21/2023     K 4.6 03/21/2023    CO2 26.0 03/21/2023     03/21/2023    BUN 20 03/21/2023    CREATININE 0.68 03/21/2023    EGFRIFNONA 79 01/13/2021    BCR 29.4 (H) 03/21/2023    ANIONGAP 8.0 03/21/2023       CT Chest Pulmonary Angiogram   2/10/2023 5:12 PM   IMPRESSION:   1.  Segmental pulmonary emboli are identified within the right upper and lower pulmonary arteries. No evidence of right heart strain.     2.  Small/moderate volume left-sided pleural fluid collection. There is bibasilar atelectasis, left side worse than right.     3.  Small low-attenuation left thyroid lobe nodule. If not previously assessed, nonemergent/outpatient thyroid sonography should be considered for complete evaluation.     4.  Please refer to same-day CT abdomen pelvis for characterization of abdominopelvic findings.     CT Abdomen Pelvis W   2/10/2023 5:12 PM   IMPRESSION:   1.  Interval changes from splenic artery embolization with embolization material along the lesser curvature of the stomach, near the splenic hilum, and into the splenic parenchyma. The spleen is enlarged with new geographic areas of hypodensity consistent with large splenic infarct.     2.  Moderate left-sided hydroureteronephrosis is unchanged.     3.  Portal vein, splenic vein, and superior mesenteric vein thrombosis is redemonstrated. Diffuse mesenteric edema is unchanged.     4.  Increased small volume ascites.     5.  Small left pleural effusion and adjacent opacity consistent with atelectasis.        VENOUS DOPPLER 12/19/2022  Interpretation Summary       •  Acute deep vein thrombosis in 1 of 2 paired left peroneal veins.  •  Chronic deep vein thrombosis in the left gastrocnemius vein.  •  Previous ablation of the left great saphenous vein in the thigh and proximal calf.  •  All other left sided veins appeared normal.      Assessment & Plan     1.  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  She was  worked up at the [facility] at that time with no obvious thrombophilic defect.  She has been anticoagulated with Coumadin for about 20 years but has now been switched to Eliquis.  2.  Portal hypertension leading to esophageal and rectal varices.  3.  Extensive GI bleeding from varices requiring intervention at [facility] including banding of esophageal varices and rectal varices.  4.  Splenic artery embolization by interventional radiology at [facility] 2/1/2023  5.  Anemia due to acute blood loss and iron deficiency.  6.  Failure to respond to oral iron therapy due to malabsorption.  She has had a good response to 2 weekly doses of IV Injectafer 750 mg each on 3/30/2023 and 4/6/2023 with hemoglobin today at 11.9 g/dL.  7.  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  Platelet count was slightly higher today at 694,000.  8.  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  Her leukocytosis has resolved.  9.  Bilateral pulmonary emboli.  Patient will require long-term Eliquis anticoagulation.    PLAN  1.  For now patient will remain on Eliquis 5 mg twice daily.  2.  We will review her iron studies when they return and contact her if she needs additional IV iron therapy.  3.  Otherwise we will plan to see her back in the office in 3 months with repeat CBC and iron studies performed 1 week prior to that visit.  4.  With her next lab draw we also will order a D-dimer and JAK2 mutation.

## 2023-08-08 ENCOUNTER — LAB (OUTPATIENT)
Dept: OTHER | Facility: HOSPITAL | Age: 68
End: 2023-08-08
Payer: MEDICARE

## 2023-08-08 DIAGNOSIS — D75.838 OTHER THROMBOCYTOSIS: ICD-10-CM

## 2023-08-08 DIAGNOSIS — D75.839 THROMBOCYTOSIS: ICD-10-CM

## 2023-08-08 DIAGNOSIS — I82.452 ACUTE DEEP VEIN THROMBOSIS (DVT) OF LEFT PERONEAL VEIN: ICD-10-CM

## 2023-08-08 DIAGNOSIS — D72.829 LEUKOCYTOSIS, UNSPECIFIED TYPE: ICD-10-CM

## 2023-08-08 DIAGNOSIS — K90.9 MALABSORPTION OF IRON: ICD-10-CM

## 2023-08-08 DIAGNOSIS — D50.0 IRON DEFICIENCY ANEMIA DUE TO CHRONIC BLOOD LOSS: ICD-10-CM

## 2023-08-08 LAB
ALBUMIN SERPL-MCNC: 3.5 G/DL (ref 3.5–5.2)
ALBUMIN/GLOB SERPL: 0.8 G/DL
ALP SERPL-CCNC: 129 U/L (ref 39–117)
ALT SERPL W P-5'-P-CCNC: 15 U/L (ref 1–33)
ANION GAP SERPL CALCULATED.3IONS-SCNC: 9.9 MMOL/L (ref 5–15)
AST SERPL-CCNC: 26 U/L (ref 1–32)
BASOPHILS # BLD AUTO: 0.11 10*3/MM3 (ref 0–0.2)
BASOPHILS NFR BLD AUTO: 1 % (ref 0–1.5)
BILIRUB SERPL-MCNC: 0.8 MG/DL (ref 0–1.2)
BUN SERPL-MCNC: 15 MG/DL (ref 8–23)
BUN/CREAT SERPL: 19 (ref 7–25)
CALCIUM SPEC-SCNC: 9.9 MG/DL (ref 8.6–10.5)
CHLORIDE SERPL-SCNC: 104 MMOL/L (ref 98–107)
CO2 SERPL-SCNC: 28.1 MMOL/L (ref 22–29)
CREAT SERPL-MCNC: 0.79 MG/DL (ref 0.57–1)
D DIMER PPP FEU-MCNC: 9.04 MCGFEU/ML (ref 0–0.66)
DEPRECATED RDW RBC AUTO: 53.1 FL (ref 37–54)
EGFRCR SERPLBLD CKD-EPI 2021: 82.6 ML/MIN/1.73
EOSINOPHIL # BLD AUTO: 0.82 10*3/MM3 (ref 0–0.4)
EOSINOPHIL NFR BLD AUTO: 7.4 % (ref 0.3–6.2)
ERYTHROCYTE [DISTWIDTH] IN BLOOD BY AUTOMATED COUNT: 16.5 % (ref 12.3–15.4)
FERRITIN SERPL-MCNC: 201.3 NG/ML (ref 13–150)
GLOBULIN UR ELPH-MCNC: 4.5 GM/DL
GLUCOSE SERPL-MCNC: 123 MG/DL (ref 65–99)
HCT VFR BLD AUTO: 39.6 % (ref 34–46.6)
HGB BLD-MCNC: 13 G/DL (ref 12–15.9)
IMM GRANULOCYTES # BLD AUTO: 0.04 10*3/MM3 (ref 0–0.05)
IMM GRANULOCYTES NFR BLD AUTO: 0.4 % (ref 0–0.5)
IRON 24H UR-MRATE: 73 MCG/DL (ref 37–145)
IRON SATN MFR SERPL: 27 % (ref 20–50)
LYMPHOCYTES # BLD AUTO: 1.51 10*3/MM3 (ref 0.7–3.1)
LYMPHOCYTES NFR BLD AUTO: 13.6 % (ref 19.6–45.3)
MCH RBC QN AUTO: 29.3 PG (ref 26.6–33)
MCHC RBC AUTO-ENTMCNC: 32.8 G/DL (ref 31.5–35.7)
MCV RBC AUTO: 89.2 FL (ref 79–97)
MONOCYTES # BLD AUTO: 0.89 10*3/MM3 (ref 0.1–0.9)
MONOCYTES NFR BLD AUTO: 8 % (ref 5–12)
NEUTROPHILS NFR BLD AUTO: 69.6 % (ref 42.7–76)
NEUTROPHILS NFR BLD AUTO: 7.72 10*3/MM3 (ref 1.7–7)
NRBC BLD AUTO-RTO: 0 /100 WBC (ref 0–0.2)
PLATELET # BLD AUTO: 794 10*3/MM3 (ref 140–450)
PMV BLD AUTO: 10.5 FL (ref 6–12)
POTASSIUM SERPL-SCNC: 4.5 MMOL/L (ref 3.5–5.2)
PROT SERPL-MCNC: 8 G/DL (ref 6–8.5)
RBC # BLD AUTO: 4.44 10*6/MM3 (ref 3.77–5.28)
SODIUM SERPL-SCNC: 142 MMOL/L (ref 136–145)
TIBC SERPL-MCNC: 268 MCG/DL (ref 298–536)
TRANSFERRIN SERPL-MCNC: 180 MG/DL (ref 200–360)
WBC NRBC COR # BLD: 11.09 10*3/MM3 (ref 3.4–10.8)

## 2023-08-08 PROCEDURE — 83540 ASSAY OF IRON: CPT | Performed by: INTERNAL MEDICINE

## 2023-08-08 PROCEDURE — 85379 FIBRIN DEGRADATION QUANT: CPT | Performed by: INTERNAL MEDICINE

## 2023-08-08 PROCEDURE — 84466 ASSAY OF TRANSFERRIN: CPT | Performed by: INTERNAL MEDICINE

## 2023-08-08 PROCEDURE — 85025 COMPLETE CBC W/AUTO DIFF WBC: CPT | Performed by: INTERNAL MEDICINE

## 2023-08-08 PROCEDURE — 80053 COMPREHEN METABOLIC PANEL: CPT | Performed by: INTERNAL MEDICINE

## 2023-08-08 PROCEDURE — 36415 COLL VENOUS BLD VENIPUNCTURE: CPT

## 2023-08-08 PROCEDURE — 82728 ASSAY OF FERRITIN: CPT | Performed by: INTERNAL MEDICINE

## 2023-08-15 ENCOUNTER — OFFICE (OUTPATIENT)
Dept: URBAN - METROPOLITAN AREA CLINIC 66 | Facility: CLINIC | Age: 68
End: 2023-08-15

## 2023-08-15 VITALS — DIASTOLIC BLOOD PRESSURE: 66 MMHG | HEART RATE: 62 BPM | HEIGHT: 68 IN | SYSTOLIC BLOOD PRESSURE: 111 MMHG

## 2023-08-15 DIAGNOSIS — I85.00 ESOPHAGEAL VARICES WITHOUT BLEEDING: ICD-10-CM

## 2023-08-15 DIAGNOSIS — I81 PORTAL VEIN THROMBOSIS: ICD-10-CM

## 2023-08-15 DIAGNOSIS — K64.9 UNSPECIFIED HEMORRHOIDS: ICD-10-CM

## 2023-08-15 DIAGNOSIS — Z86.010 PERSONAL HISTORY OF COLONIC POLYPS: ICD-10-CM

## 2023-08-15 PROCEDURE — 99213 OFFICE O/P EST LOW 20 MIN: CPT | Performed by: INTERNAL MEDICINE

## 2023-08-22 ENCOUNTER — OFFICE VISIT (OUTPATIENT)
Dept: ONCOLOGY | Facility: CLINIC | Age: 68
End: 2023-08-22
Payer: MEDICARE

## 2023-08-22 ENCOUNTER — SPECIALTY PHARMACY (OUTPATIENT)
Dept: PHARMACY | Facility: HOSPITAL | Age: 68
End: 2023-08-22
Payer: MEDICARE

## 2023-08-22 VITALS
BODY MASS INDEX: 24.28 KG/M2 | HEIGHT: 68 IN | DIASTOLIC BLOOD PRESSURE: 71 MMHG | RESPIRATION RATE: 18 BRPM | SYSTOLIC BLOOD PRESSURE: 106 MMHG | TEMPERATURE: 98.4 F | HEART RATE: 70 BPM | WEIGHT: 160.2 LBS | OXYGEN SATURATION: 97 %

## 2023-08-22 DIAGNOSIS — D72.829 LEUKOCYTOSIS, UNSPECIFIED TYPE: ICD-10-CM

## 2023-08-22 DIAGNOSIS — K90.9 MALABSORPTION OF IRON: ICD-10-CM

## 2023-08-22 DIAGNOSIS — D50.0 IRON DEFICIENCY ANEMIA DUE TO CHRONIC BLOOD LOSS: ICD-10-CM

## 2023-08-22 DIAGNOSIS — D47.1 MYELOPROLIFERATIVE DISORDER: ICD-10-CM

## 2023-08-22 DIAGNOSIS — I82.890 SPLENIC VEIN THROMBOSIS: ICD-10-CM

## 2023-08-22 DIAGNOSIS — Z15.89 JAK2 V617F MUTATION: ICD-10-CM

## 2023-08-22 DIAGNOSIS — I82.452 ACUTE DEEP VEIN THROMBOSIS (DVT) OF LEFT PERONEAL VEIN: Primary | ICD-10-CM

## 2023-08-22 LAB — REF LAB TEST METHOD: NORMAL

## 2023-08-22 RX ORDER — HYDROXYUREA 500 MG/1
500 CAPSULE ORAL DAILY
Qty: 90 CAPSULE | Refills: 3 | Status: SHIPPED | OUTPATIENT
Start: 2023-08-22

## 2023-08-22 NOTE — PROGRESS NOTES
Subjective     REASON FOR FOLLOW UP:   1.  History of extensive portal vein thrombosis 20 years ago.    2.  Portal hypertension causing esophageal and rectal varices  3.  Severe GI bleeding from varices  4.  Blood loss anemia  5.  Iron deficiency anemia from chronic GI blood loss  6.  Interventions for severe GI bleeding at [facility] in February 2023 including endoscopic banding of esophageal and rectal varices 1/27/2023 and splenic artery embolization by interventional radiology 2/1/2023  7.  Pulmonary emboli on CT angiogram of the chest 2/10/2023  8.  Anticoagulation with Eliquis 5 mg p.o. twice daily  9.  Lack of response to oral iron therapy due to malabsorption  10.  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  IV iron therapy with 2 doses of Injectafer 750 mg each on 3/30/2023 and 4/6/2023      HISTORY OF PRESENT ILLNESS:  The patient is a 68 y.o. year old female who was referred to us from her primary care physician due to a history of extensive portal vein and splenic vein thrombus about 20 years ago.  She has been on anticoagulation ever since with Coumadin.  She had been evaluated at the [facility] in the past and was negative for factor V Leiden mutation or prothrombin gene mutation.  Her IgG and IgM anticardiolipin antibodies were also negative.    She had recently undergone a Doppler study on lower extremities 12/19/2022 and was found to have a peroneal vein DVT in the left lower extremity.  Upon review of some of her recent coag studies she has had INRs that have been subtherapeutic at times but she has remained on Coumadin.  This clot occurred in the setting of recent surgery on the left foot for osteophyte.    After our initial consult visit of 1/17/2023 the patient developed severe GI bleeding and has had multiple hospitalizations.  She presented to [facility] for admission 1/21/2023 with bright red blood per rectum and at that time she  was anticoagulated with Coumadin.  She received fresh frozen plasma and vitamin K and was transfused with red blood cells.  She underwent an EGD performed by Dr. Livia Mccoy of gastroenterology with no obvious bleeding seen.    It was felt that she likely had bleeding from rectal varices and decision was made to transfer the patient to [facility] for further intervention.  She underwent endoscopic banding of both esophageal varices and rectal varices performed by Dr. Hernandez on 1/27/2023.  She then underwent interventional radiology splenic artery embolization on 2/1/2023.    She was discharged from the hospital but developed chest pain and was found to have pulmonary emboli on CT angiogram of the chest 2/10/2023 and was readmitted to [facility] from 2/10/2023 to 2/14/2023.  She was transition from Coumadin to Eliquis and seems to be tolerating the Eliquis well.    Was found to be iron deficient with an iron saturation of 6%.  We scheduled IV iron therapy with 2 doses of IV Injectafer 750 mg each delivered 1 week apart.  She received the first dose on 3/30/2023 and the second dose on 4/6/2023 and tolerated it well.  She returns for follow-up today having undergone repeat iron studies on 8/8/2023 showing she is now replete with iron.  Her hemoglobin was 13 g/dL.  Iron saturation was 27% with a ferritin level of 201.  She also had a JAK2 mutation drawn last week which is pending.  She continues to have thrombocytosis with a platelet count greater than 700,000 although this is likely due to her previous splenic artery embolization.    She remains on Eliquis which she reports causes some itching.  She is able to tolerate it with the use of nightly hydroxyzine for itching.      History of Present Illness     Past Medical History:   Diagnosis Date    Acute torn meniscus     Right    Ankle sprain 1967    many times as a kid    Arthritis     DEGENERATIVE HANDS AND FEET    Arthritis of back     Arthritis of  neck 2012    femoral neck in osteopenic range    CTS (carpal tunnel syndrome) 2011    or degenerative arthritis    Difficulty urinating     Dry eyes     Eczema     Esophageal varices     Fracture, foot 2021    not a fracture but you provide no space for my feet problem of arthritus/bunions/constant pain/flat footed    Gastritis     Great toe pain     Hiatal hernia     History of dizziness     Hypertension     Knee swelling 2020    Legal blindness     LEFT EYE    Low back strain 1980    not as bad now    Mesenteric venous thrombosis     PORTAL AND SPLENIC    Pain     ABD WITH CERTAIN FOOD    Pelvic pressure in female     WITH CERTAIN MOVEMENTS AND WHEN SITTING    Portal hypertension     Portal vein thrombosis     Splenic vein thrombosis     Splenomegaly     Tear of meniscus of knee 2020    R knee osteoarthritis/torn Mesiscus 2 places    Thyroid disease     Ureter, stricture     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 AND BEING FOLLOWED BY MICHELLE DESIR MD        Past Surgical History:   Procedure Laterality Date    APPENDECTOMY N/A 1985    COLONOSCOPY N/A 2019    CYSTOSCOPY Bilateral 10/14/2020    Procedure: CYSTOSCOPY RETROGRADE PYELOGRAM;  Surgeon: Jaiden Desir MD;  Location: [facility] MAIN OR;  Service: Urology;  Laterality: Bilateral;    CYSTOSCOPY W/ URETERAL STENT REMOVAL      ENDOSCOPY N/A 2019    ENDOSCOPY Left 1/23/2023    Procedure: ESOPHAGOGASTRODUODENOSCOPY AT BEDSIDE;  Surgeon: Livia Mcmanus MD;  Location: [facility] ENDOSCOPY;  Service: Gastroenterology;  Laterality: Left;  PRE- HEMATOCHEZIA, ESOPHAGEAL VARICES, GASTRIC VARIX    EXPLORATORY LAPAROTOMY N/A 2001    EXPLORATORY LAPAROTOMY      ORIGINALLY LOOKING FOR OVARIAN CANCER, WAS NOT FOUND.  FOUND MESENTERIC/PORTAL/SPLENIC VENOUS THROMBOSIS    FOOT FUSION Left 10/25/2022    Procedure: Left first metatarsal phalangeal joint fusion;  Surgeon: Ulises Fairchild MD;  Location: [facility] OR OSC;  Service: Orthopedics;  Laterality: Left;    HERNIA REPAIR N/A      INGUINAL HERNIA REPAIR Left 01/20/2021    Procedure: open ventral hernia repair with mesh;  Surgeon: Yoanna Jacobo MD;  Location: [facility] MAIN OR;  Service: General;  Laterality: Left;    LAPAROSCOPIC CHOLECYSTECTOMY N/A 2001    TONSILLECTOMY  1971    URETERAL STENT INSERTION          Current Outpatient Medications on File Prior to Visit   Medication Sig Dispense Refill    ascorbic acid (VITAMIN C) 500 MG tablet Take 2 tablets by mouth Daily. PT HOLDING FOR SURGERY      calcium carbonate (TUMS) 500 MG chewable tablet Chew 1 tablet Daily.      Cholecalciferol (Vitamin D3) 50 MCG (2000 UT) capsule Take 1 capsule by mouth Daily.      Eliquis 5 MG tablet tablet Take 1 tablet by mouth Every 12 (Twelve) Hours.      ferrous sulfate 325 (65 FE) MG EC tablet TAKE 1 TABLET BY MOUTH ONCE DAILY FOR 7 DAYS AND THEN 1 TWICE DAILY FOR 7 DAYS AND THEN 1 THREE TIMES DAILY (INCREASE IF TOLERATED)      hydrOXYzine (ATARAX) 25 MG tablet Take 1-2 tablets by mouth As Needed.      levothyroxine (SYNTHROID, LEVOTHROID) 50 MCG tablet Take 1 tablet by mouth Daily.      Magnesium Oxide 400 (240 Mg) MG tablet Take 1 tablet by mouth Daily.      melatonin 3 MG tablet Take 1 tablet by mouth At Night As Needed for Sleep. 30 tablet     multivitamin (THERAGRAN) tablet tablet Take 1 tablet by mouth Daily. PT HOLDING FOR SURGERY      nadolol (CORGARD) 20 MG tablet Take 1.5 tablets by mouth Every Morning.      Probiotic Product (PROBIOTIC DAILY PO) Take 1 capsule by mouth Every Night.      Zinc 100 MG tablet Take 200 tablet/day by mouth.       No current facility-administered medications on file prior to visit.        ALLERGIES:    Allergies   Allergen Reactions    Contrast Dye (Echo Or Unknown Ct/Mr) Swelling     FACIAL    Diclofenac Other (See Comments)     Bad heartburn causing pressure in the chest    Aspartame Itching    Morphine Headache    Heparin Unknown - Low Severity     HIT 2/11 per Dr. Neetu Galindo [Terbinafine] Swelling      JOINT PAIN, REDNESS    Dexamethasone GI Intolerance    Prednisone Rash and GI Intolerance     CAN TAKE IF NEEDED        Social History     Socioeconomic History    Marital status:     Number of children: 3   Tobacco Use    Smoking status: Never    Smokeless tobacco: Never   Vaping Use    Vaping Use: Never used   Substance and Sexual Activity    Alcohol use: Yes     Comment: glass of wine a month with dinner    Drug use: Never    Sexual activity: Never        Family History   Problem Relation Age of Onset    Osteoporosis Mother     Heart disease Father     Tuberculosis Father     Breast cancer Sister     Cancer Sister         lymphnodes    Cancer Sister         pallete    Colon cancer Paternal Grandfather     Malig Hyperthermia Neg Hx         Review of Systems   Constitutional:  Positive for fatigue and unexpected weight change. Negative for activity change, appetite change, chills and fever.   HENT:  Negative for hearing loss, mouth sores, nosebleeds, trouble swallowing and voice change.    Eyes:  Negative for pain and visual disturbance.   Respiratory:  Positive for cough. Negative for shortness of breath and wheezing.    Cardiovascular:  Negative for chest pain and palpitations.   Gastrointestinal:  Positive for abdominal distention and diarrhea. Negative for abdominal pain, constipation, nausea and vomiting.   Genitourinary:  Negative for difficulty urinating, frequency, hematuria and urgency.   Musculoskeletal:  Negative for arthralgias, back pain, joint swelling and neck pain.   Skin:  Negative for rash.   Neurological:  Positive for weakness. Negative for dizziness, seizures, syncope and headaches.   Hematological:  Negative for adenopathy. Bruises/bleeds easily.   Psychiatric/Behavioral:  Negative for behavioral problems and confusion. The patient is not nervous/anxious.       Objective     Vitals:    08/22/23 1256   BP: 106/71   Pulse: 70   Resp: 18   Temp: 98.4 øF (36.9 øC)   TempSrc: Temporal  "  SpO2: 97%   Weight: 72.7 kg (160 lb 3.2 oz)   Height: 172.7 cm (67.99\")   PainSc: 0-No pain         8/22/2023     1:03 PM   Current Status   ECOG score 0       Physical Exam  Constitutional:       General: She is not in acute distress.     Appearance: She is well-developed. She is ill-appearing.   HENT:      Head: Normocephalic.   Eyes:      General: No scleral icterus.     Conjunctiva/sclera: Conjunctivae normal.      Pupils: Pupils are equal, round, and reactive to light.   Neck:      Thyroid: No thyromegaly.      Vascular: No JVD.   Cardiovascular:      Rate and Rhythm: Normal rate and regular rhythm.      Heart sounds: No murmur heard.    No friction rub. No gallop.   Pulmonary:      Effort: Pulmonary effort is normal.      Breath sounds: Normal breath sounds. No wheezing or rales.   Abdominal:      General: There is distension.      Tenderness: There is abdominal tenderness.      Comments: Abdomen is firm and distended   Musculoskeletal:         General: No deformity. Normal range of motion.      Cervical back: Normal range of motion and neck supple.   Lymphadenopathy:      Cervical: No cervical adenopathy.   Skin:     General: Skin is warm and dry.      Findings: No erythema or rash.   Neurological:      Mental Status: She is alert and oriented to person, place, and time.      Cranial Nerves: No cranial nerve deficit.      Deep Tendon Reflexes: Reflexes are normal and symmetric.   Psychiatric:         Behavior: Behavior normal.         Judgment: Judgment normal.         RECENT LABS:  Hematology WBC   Date Value Ref Range Status   08/08/2023 11.09 (H) 3.40 - 10.80 10*3/mm3 Final     RBC   Date Value Ref Range Status   08/08/2023 4.44 3.77 - 5.28 10*6/mm3 Final     Hemoglobin   Date Value Ref Range Status   08/08/2023 13.0 12.0 - 15.9 g/dL Final     Hematocrit   Date Value Ref Range Status   08/08/2023 39.6 34.0 - 46.6 % Final     Platelets   Date Value Ref Range Status   08/08/2023 794 (H) 140 - 450 10*3/mm3 " Final        Lab Results   Component Value Date    GLUCOSE 123 (H) 08/08/2023    CALCIUM 9.9 08/08/2023     08/08/2023    K 4.5 08/08/2023    CO2 28.1 08/08/2023     08/08/2023    BUN 15 08/08/2023    CREATININE 0.79 08/08/2023    EGFRIFNONA 79 01/13/2021    BCR 19.0 08/08/2023    ANIONGAP 9.9 08/08/2023       CT Chest Pulmonary Angiogram   2/10/2023 5:12 PM   IMPRESSION:   1.  Segmental pulmonary emboli are identified within the right upper and lower pulmonary arteries. No evidence of right heart strain.     2.  Small/moderate volume left-sided pleural fluid collection. There is bibasilar atelectasis, left side worse than right.     3.  Small low-attenuation left thyroid lobe nodule. If not previously assessed, nonemergent/outpatient thyroid sonography should be considered for complete evaluation.     4.  Please refer to same-day CT abdomen pelvis for characterization of abdominopelvic findings.     CT Abdomen Pelvis W   2/10/2023 5:12 PM   IMPRESSION:   1.  Interval changes from splenic artery embolization with embolization material along the lesser curvature of the stomach, near the splenic hilum, and into the splenic parenchyma. The spleen is enlarged with new geographic areas of hypodensity consistent with large splenic infarct.     2.  Moderate left-sided hydroureteronephrosis is unchanged.     3.  Portal vein, splenic vein, and superior mesenteric vein thrombosis is redemonstrated. Diffuse mesenteric edema is unchanged.     4.  Increased small volume ascites.     5.  Small left pleural effusion and adjacent opacity consistent with atelectasis.        VENOUS DOPPLER 12/19/2022  Interpretation Summary         Acute deep vein thrombosis in 1 of 2 paired left peroneal veins.    Chronic deep vein thrombosis in the left gastrocnemius vein.    Previous ablation of the left great saphenous vein in the thigh and proximal calf.    All other left sided veins appeared normal.      Assessment & Plan     1.   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  She was worked up at the [facility] at that time with no obvious thrombophilic defect.  She has been anticoagulated with Coumadin for about 20 years but has now been switched to Eliquis.  2.  Portal hypertension leading to esophageal and rectal varices.  3.  Extensive GI bleeding from varices requiring intervention at [facility] including banding of esophageal varices and rectal varices.  4.  Splenic artery embolization by interventional radiology at [facility] 2/1/2023  5.  Anemia due to acute blood loss and iron deficiency.  6.  Failure to respond to oral iron therapy due to malabsorption.  She has had a good response to 2 weekly doses of IV Injectafer 750 mg each on 3/30/2023 and 4/6/2023 with hemoglobin today at 11.9 g/dL.  7.  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  Platelet count was slightly higher today at 694,000.  8.  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  Her leukocytosis has resolved.  9.  Bilateral pulmonary emboli.  Patient will require long-term Eliquis anticoagulation.    PLAN  1.  We reviewed the results of the labs from 8/8/2023 with the patient.  She currently does not require any additional IV iron therapy.  2.  For now patient will remain on Eliquis 5 mg twice daily.  3.  We will review the results of the JAK2 mutation when it becomes available and contact the patient to discuss any abnormal results.  4.  She will otherwise be scheduled return to our office for follow-up in 3 months with repeat labs drawn 1 week prior to the visit.    Addendum:  I spoke with the lab and her JAK2 mutation report was positive therefore it does appear that she has a myeloproliferative disorder and for this reason we will plan to start her on Hydrea 500 mg daily try to control her platelet count.  We will also plan to see her back in the office in 1 month  to assess her tolerance to Hydrea and to check another blood count at that time.

## 2023-08-23 ENCOUNTER — SPECIALTY PHARMACY (OUTPATIENT)
Dept: PHARMACY | Facility: HOSPITAL | Age: 68
End: 2023-08-23
Payer: MEDICARE
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
## 2023-08-31 ENCOUNTER — SPECIALTY PHARMACY (OUTPATIENT)
Dept: PHARMACY | Facility: HOSPITAL | Age: 68
End: 2023-08-31
Payer: MEDICARE

## 2023-08-31 NOTE — PROGRESS NOTES
"[facility] telephone encounter re:adherence and side effects (hydrea)     Blanka \"Anne Marie Holder\" reports starting hydrea on 8/23. Thus far, patient denies side effects, aside from fatigue, which is manageable and not bothersome to patient. Advised patient to alert office if this changes. . Thus far, no missed doses and no medication changes. Blanka asked to go over medication handling again. We discussed if she would like to use a pill box that would be fine and she should wash her hands after taking her medication. We discussed that we prefer nobody else should handle medication. Patient verbalized understanding. Advised pt to call office if any changes. Patient expressed understanding and had no additional questions at this time.       Jaylin Smith RP  8/31/2023  10:29 EDT   "

## 2023-09-22 ENCOUNTER — OFFICE VISIT (OUTPATIENT)
Dept: ONCOLOGY | Facility: CLINIC | Age: 68
End: 2023-09-22
Payer: MEDICARE

## 2023-09-22 ENCOUNTER — LAB (OUTPATIENT)
Dept: OTHER | Facility: HOSPITAL | Age: 68
End: 2023-09-22
Payer: MEDICARE

## 2023-09-22 VITALS
SYSTOLIC BLOOD PRESSURE: 114 MMHG | HEART RATE: 69 BPM | RESPIRATION RATE: 18 BRPM | OXYGEN SATURATION: 97 % | DIASTOLIC BLOOD PRESSURE: 74 MMHG | TEMPERATURE: 97.5 F | WEIGHT: 158.6 LBS | HEIGHT: 68 IN | BODY MASS INDEX: 24.04 KG/M2

## 2023-09-22 DIAGNOSIS — K76.6 PORTAL HYPERTENSION WITH ESOPHAGEAL VARICES: ICD-10-CM

## 2023-09-22 DIAGNOSIS — I85.00 PORTAL HYPERTENSION WITH ESOPHAGEAL VARICES: ICD-10-CM

## 2023-09-22 DIAGNOSIS — K55.069 MESENTERIC VEIN THROMBOSIS: ICD-10-CM

## 2023-09-22 DIAGNOSIS — D75.839 THROMBOCYTOSIS: ICD-10-CM

## 2023-09-22 DIAGNOSIS — D73.5 SPLENIC INFARCTION: ICD-10-CM

## 2023-09-22 DIAGNOSIS — K90.9 MALABSORPTION OF IRON: ICD-10-CM

## 2023-09-22 DIAGNOSIS — D68.59 THROMBOPHILIA: ICD-10-CM

## 2023-09-22 DIAGNOSIS — I82.890 SPLENIC VEIN THROMBOSIS: ICD-10-CM

## 2023-09-22 DIAGNOSIS — D47.1 MYELOPROLIFERATIVE DISORDER: ICD-10-CM

## 2023-09-22 DIAGNOSIS — I82.452 ACUTE DEEP VEIN THROMBOSIS (DVT) OF LEFT PERONEAL VEIN: Primary | ICD-10-CM

## 2023-09-22 LAB
BASOPHILS # BLD AUTO: 0.1 10*3/MM3 (ref 0–0.2)
BASOPHILS NFR BLD AUTO: 0.9 % (ref 0–1.5)
DEPRECATED RDW RBC AUTO: 58.2 FL (ref 37–54)
EOSINOPHIL # BLD AUTO: 0.55 10*3/MM3 (ref 0–0.4)
EOSINOPHIL NFR BLD AUTO: 4.9 % (ref 0.3–6.2)
ERYTHROCYTE [DISTWIDTH] IN BLOOD BY AUTOMATED COUNT: 17.4 % (ref 12.3–15.4)
HCT VFR BLD AUTO: 40.3 % (ref 34–46.6)
HGB BLD-MCNC: 13.1 G/DL (ref 12–15.9)
IMM GRANULOCYTES # BLD AUTO: 0.08 10*3/MM3 (ref 0–0.05)
IMM GRANULOCYTES NFR BLD AUTO: 0.7 % (ref 0–0.5)
LYMPHOCYTES # BLD AUTO: 1.34 10*3/MM3 (ref 0.7–3.1)
LYMPHOCYTES NFR BLD AUTO: 12 % (ref 19.6–45.3)
MCH RBC QN AUTO: 29.7 PG (ref 26.6–33)
MCHC RBC AUTO-ENTMCNC: 32.5 G/DL (ref 31.5–35.7)
MCV RBC AUTO: 91.4 FL (ref 79–97)
MONOCYTES # BLD AUTO: 1.11 10*3/MM3 (ref 0.1–0.9)
MONOCYTES NFR BLD AUTO: 9.9 % (ref 5–12)
NEUTROPHILS NFR BLD AUTO: 71.6 % (ref 42.7–76)
NEUTROPHILS NFR BLD AUTO: 8.01 10*3/MM3 (ref 1.7–7)
NRBC BLD AUTO-RTO: 0 /100 WBC (ref 0–0.2)
PLATELET # BLD AUTO: 707 10*3/MM3 (ref 140–450)
PMV BLD AUTO: 9.8 FL (ref 6–12)
RBC # BLD AUTO: 4.41 10*6/MM3 (ref 3.77–5.28)
WBC NRBC COR # BLD: 11.19 10*3/MM3 (ref 3.4–10.8)

## 2023-09-22 PROCEDURE — 36415 COLL VENOUS BLD VENIPUNCTURE: CPT

## 2023-09-22 PROCEDURE — 85025 COMPLETE CBC W/AUTO DIFF WBC: CPT | Performed by: INTERNAL MEDICINE

## 2023-09-22 RX ORDER — TRIAMCINOLONE ACETONIDE 1 MG/G
1 CREAM TOPICAL AS NEEDED
COMMUNITY
Start: 2023-09-01 | End: 2024-08-31

## 2023-09-22 NOTE — PROGRESS NOTES
Subjective     REASON FOR FOLLOW UP:   1.  History of extensive portal vein thrombosis 20 years ago.    2.  Portal hypertension causing esophageal and rectal varices  3.  Severe GI bleeding from varices  4.  Blood loss anemia  5.  Iron deficiency anemia from chronic GI blood loss  6.  Interventions for severe GI bleeding at [facility] in February 2023 including endoscopic banding of esophageal and rectal varices 1/27/2023 and splenic artery embolization by interventional radiology 2/1/2023  7.  Pulmonary emboli on CT angiogram of the chest 2/10/2023  8.  Anticoagulation with Eliquis 5 mg p.o. twice daily  9.  Lack of response to oral iron therapy due to malabsorption  10.  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  IV iron therapy with 2 doses of Injectafer 750 mg each on 3/30/2023 and 4/6/2023  13.  JAK2 mutation positive.  We believe the patient does indeed have polycythemia vera and she was started on Hydrea therapy 500 mg daily in August 2023      HISTORY OF PRESENT ILLNESS:  The patient is a 68 y.o. year old female who was referred to us from her primary care physician due to a history of extensive portal vein and splenic vein thrombus about 20 years ago.  She has been on anticoagulation ever since with Coumadin.  She had been evaluated at the [facility] in the past and was negative for factor V Leiden mutation or prothrombin gene mutation.  Her IgG and IgM anticardiolipin antibodies were also negative.    She had recently undergone a Doppler study on lower extremities 12/19/2022 and was found to have a peroneal vein DVT in the left lower extremity.  Upon review of some of her recent coag studies she has had INRs that have been subtherapeutic at times but she has remained on Coumadin.  This clot occurred in the setting of recent surgery on the left foot for osteophyte.    After our initial consult visit of 1/17/2023 the patient developed severe GI  bleeding and has had multiple hospitalizations.  She presented to [facility] for admission 1/21/2023 with bright red blood per rectum and at that time she was anticoagulated with Coumadin.  She received fresh frozen plasma and vitamin K and was transfused with red blood cells.  She underwent an EGD performed by Dr. Livia Mccoy of gastroenterology with no obvious bleeding seen.    It was felt that she likely had bleeding from rectal varices and decision was made to transfer the patient to [facility] for further intervention.  She underwent endoscopic banding of both esophageal varices and rectal varices performed by Dr. Hernandez on 1/27/2023.  She then underwent interventional radiology splenic artery embolization on 2/1/2023.    She was discharged from the hospital but developed chest pain and was found to have pulmonary emboli on CT angiogram of the chest 2/10/2023 and was readmitted to [facility] from 2/10/2023 to 2/14/2023.  She was transition from Coumadin to Eliquis and seems to be tolerating the Eliquis well.    Was found to be iron deficient with an iron saturation of 6%.  She received IV iron therapy with 2 doses of IV Injectafer 750 mg each delivered 1 week apart.  She received the first dose on 3/30/2023 and the second dose on 4/6/2023 and tolerated it well.  Follow-up labs on 8/8/2023 showed excellent response. Her hemoglobin was 13 g/dL.  Iron saturation was 27% with a ferritin level of 201.  She also had a JAK2 mutation drawn last week which is pending.  She continues to have thrombocytosis with a platelet count greater than 700,000 a because of this we did order a JAK2 mutation.    INTERVAL HISTORY:  Her JAK2 mutation was positive and because of this we contacted the patient and had her start on low-dose Hydrea 500 mg daily.  She has been on the Hydrea for about 1 month and seems to be tolerating it well.  Her platelet count remains 700,000 today is slightly lower.  She reports  that she has been a little more tired since starting Hydrea but otherwise seems to be tolerating it well.      History of Present Illness     Past Medical History:   Diagnosis Date    Acute torn meniscus     Right    Ankle sprain 1967    many times as a kid    Arthritis     DEGENERATIVE HANDS AND FEET    Arthritis of back     Arthritis of neck 2012    femoral neck in osteopenic range    CTS (carpal tunnel syndrome) 2011    or degenerative arthritis    Difficulty urinating     Dry eyes     Eczema     Esophageal varices     Fracture, foot 2021    not a fracture but you provide no space for my feet problem of arthritus/bunions/constant pain/flat footed    Gastritis     Great toe pain     Hiatal hernia     History of dizziness     Hypertension     Knee swelling 2020    Legal blindness     LEFT EYE    Low back strain 1980    not as bad now    Mesenteric venous thrombosis     PORTAL AND SPLENIC    Pain     ABD WITH CERTAIN FOOD    Pelvic pressure in female     WITH CERTAIN MOVEMENTS AND WHEN SITTING    Portal hypertension     Portal vein thrombosis     Splenic vein thrombosis     Splenomegaly     Tear of meniscus of knee 2020    R knee osteoarthritis/torn Mesiscus 2 places    Thyroid disease     Ureter, stricture     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 AND BEING FOLLOWED BY MICHELLE DESIR MD        Past Surgical History:   Procedure Laterality Date    APPENDECTOMY N/A 1985    COLONOSCOPY N/A 2019    CYSTOSCOPY Bilateral 10/14/2020    Procedure: CYSTOSCOPY RETROGRADE PYELOGRAM;  Surgeon: Jaiden Desir MD;  Location: [facility] MAIN OR;  Service: Urology;  Laterality: Bilateral;    CYSTOSCOPY W/ URETERAL STENT REMOVAL      ENDOSCOPY N/A 2019    ENDOSCOPY Left 1/23/2023    Procedure: ESOPHAGOGASTRODUODENOSCOPY AT BEDSIDE;  Surgeon: Livia Mcmanus MD;  Location: [facility] ENDOSCOPY;  Service: Gastroenterology;  Laterality: Left;  PRE- HEMATOCHEZIA, ESOPHAGEAL VARICES, GASTRIC VARIX    EXPLORATORY LAPAROTOMY N/A 2001    EXPLORATORY  LAPAROTOMY      ORIGINALLY LOOKING FOR OVARIAN CANCER, WAS NOT FOUND.  FOUND MESENTERIC/PORTAL/SPLENIC VENOUS THROMBOSIS    FOOT FUSION Left 10/25/2022    Procedure: Left first metatarsal phalangeal joint fusion;  Surgeon: Ulises Fairchild MD;  Location: [facility];  Service: Orthopedics;  Laterality: Left;    HERNIA REPAIR N/A     INGUINAL HERNIA REPAIR Left 01/20/2021    Procedure: open ventral hernia repair with mesh;  Surgeon: Yoanna Jacobo MD;  Location: [facility] OR;  Service: General;  Laterality: Left;    LAPAROSCOPIC CHOLECYSTECTOMY N/A 2001    TONSILLECTOMY  1971    URETERAL STENT INSERTION          Current Outpatient Medications on File Prior to Visit   Medication Sig Dispense Refill    ascorbic acid (VITAMIN C) 500 MG tablet Take 2 tablets by mouth Daily. PT HOLDING FOR SURGERY      calcium carbonate (TUMS) 500 MG chewable tablet Chew 1 tablet Daily.      Cholecalciferol (Vitamin D3) 50 MCG (2000 UT) capsule Take 1 capsule by mouth Daily.      Eliquis 5 MG tablet tablet Take 1 tablet by mouth Every 12 (Twelve) Hours.      hydroxyurea (HYDREA) 500 MG capsule Take 1 capsule by mouth Daily. 90 capsule 3    hydrOXYzine (ATARAX) 25 MG tablet Take 1-2 tablets by mouth As Needed.      levothyroxine (SYNTHROID, LEVOTHROID) 50 MCG tablet Take 1 tablet by mouth Daily.      Magnesium Oxide 400 (240 Mg) MG tablet Take 1 tablet by mouth Daily.      melatonin 3 MG tablet Take 1 tablet by mouth At Night As Needed for Sleep. 30 tablet     multivitamin (THERAGRAN) tablet tablet Take 1 tablet by mouth Daily. PT HOLDING FOR SURGERY      nadolol (CORGARD) 20 MG tablet Take 1.5 tablets by mouth Every Morning.      Probiotic Product (PROBIOTIC DAILY PO) Take 1 capsule by mouth Every Night.      triamcinolone (KENALOG) 0.1 % cream Apply 1 application  topically to the appropriate area as directed As Needed.      Zinc 100 MG tablet Take 200 tablet/day by mouth.       No current facility-administered medications  on file prior to visit.        ALLERGIES:    Allergies   Allergen Reactions    Contrast Dye (Echo Or Unknown Ct/Mr) Swelling     FACIAL    Diclofenac Other (See Comments)     Bad heartburn causing pressure in the chest    Aspartame Itching    Morphine Headache    Heparin Unknown - Low Severity     HIT 2/11 per Dr. Neetu Dai    Lamisil [Terbinafine] Swelling     JOINT PAIN, REDNESS    Dexamethasone GI Intolerance    Prednisone Rash and GI Intolerance     CAN TAKE IF NEEDED        Social History     Socioeconomic History    Marital status:     Number of children: 3   Tobacco Use    Smoking status: Never    Smokeless tobacco: Never   Vaping Use    Vaping Use: Never used   Substance and Sexual Activity    Alcohol use: Yes     Comment: glass of wine a month with dinner    Drug use: Never    Sexual activity: Never        Family History   Problem Relation Age of Onset    Osteoporosis Mother     Heart disease Father     Tuberculosis Father     Breast cancer Sister     Cancer Sister         lymphnodes    Cancer Sister         pallete    Colon cancer Paternal Grandfather     Malig Hyperthermia Neg Hx         Review of Systems   Constitutional:  Positive for fatigue and unexpected weight change. Negative for activity change, appetite change, chills and fever.   HENT:  Negative for hearing loss, mouth sores, nosebleeds, trouble swallowing and voice change.    Eyes:  Negative for pain and visual disturbance.   Respiratory:  Positive for cough. Negative for shortness of breath and wheezing.    Cardiovascular:  Negative for chest pain and palpitations.   Gastrointestinal:  Positive for abdominal distention and diarrhea. Negative for abdominal pain, constipation, nausea and vomiting.   Genitourinary:  Negative for difficulty urinating, frequency, hematuria and urgency.   Musculoskeletal:  Negative for arthralgias, back pain, joint swelling and neck pain.   Skin:  Negative for rash.   Neurological:  Positive for weakness.  "Negative for dizziness, seizures, syncope and headaches.   Hematological:  Negative for adenopathy. Bruises/bleeds easily.   Psychiatric/Behavioral:  Negative for behavioral problems and confusion. The patient is not nervous/anxious.       Objective     Vitals:    09/22/23 1108   BP: 114/74   Pulse: 69   Resp: 18   Temp: 97.5 °F (36.4 °C)   TempSrc: Temporal   SpO2: 97%   Weight: 71.9 kg (158 lb 9.6 oz)   Height: 172.7 cm (67.99\")   PainSc:   2   PainLoc: Abdomen  Comment: gut         9/22/2023    11:04 AM   Current Status   ECOG score 0       Physical Exam  Constitutional:       General: She is not in acute distress.     Appearance: She is well-developed. She is ill-appearing.   HENT:      Head: Normocephalic.   Eyes:      General: No scleral icterus.     Conjunctiva/sclera: Conjunctivae normal.      Pupils: Pupils are equal, round, and reactive to light.   Neck:      Thyroid: No thyromegaly.      Vascular: No JVD.   Cardiovascular:      Rate and Rhythm: Normal rate and regular rhythm.      Heart sounds: No murmur heard.    No friction rub. No gallop.   Pulmonary:      Effort: Pulmonary effort is normal.      Breath sounds: Normal breath sounds. No wheezing or rales.   Abdominal:      General: There is distension.      Tenderness: There is abdominal tenderness.      Comments: Abdomen is firm and distended   Musculoskeletal:         General: No deformity. Normal range of motion.      Cervical back: Normal range of motion and neck supple.   Lymphadenopathy:      Cervical: No cervical adenopathy.   Skin:     General: Skin is warm and dry.      Findings: No erythema or rash.   Neurological:      Mental Status: She is alert and oriented to person, place, and time.      Cranial Nerves: No cranial nerve deficit.      Deep Tendon Reflexes: Reflexes are normal and symmetric.   Psychiatric:         Behavior: Behavior normal.         Judgment: Judgment normal.         RECENT LABS:  Hematology WBC   Date Value Ref Range Status "   09/22/2023 11.19 (H) 3.40 - 10.80 10*3/mm3 Final     RBC   Date Value Ref Range Status   09/22/2023 4.41 3.77 - 5.28 10*6/mm3 Final     Hemoglobin   Date Value Ref Range Status   09/22/2023 13.1 12.0 - 15.9 g/dL Final     Hematocrit   Date Value Ref Range Status   09/22/2023 40.3 34.0 - 46.6 % Final     Platelets   Date Value Ref Range Status   09/22/2023 707 (H) 140 - 450 10*3/mm3 Final        Lab Results   Component Value Date    GLUCOSE 123 (H) 08/08/2023    CALCIUM 9.9 08/08/2023     08/08/2023    K 4.5 08/08/2023    CO2 28.1 08/08/2023     08/08/2023    BUN 15 08/08/2023    CREATININE 0.79 08/08/2023    EGFRIFNONA 79 01/13/2021    BCR 19.0 08/08/2023    ANIONGAP 9.9 08/08/2023       CT Chest Pulmonary Angiogram   2/10/2023 5:12 PM   IMPRESSION:   1.  Segmental pulmonary emboli are identified within the right upper and lower pulmonary arteries. No evidence of right heart strain.     2.  Small/moderate volume left-sided pleural fluid collection. There is bibasilar atelectasis, left side worse than right.     3.  Small low-attenuation left thyroid lobe nodule. If not previously assessed, nonemergent/outpatient thyroid sonography should be considered for complete evaluation.     4.  Please refer to same-day CT abdomen pelvis for characterization of abdominopelvic findings.     CT Abdomen Pelvis W   2/10/2023 5:12 PM   IMPRESSION:   1.  Interval changes from splenic artery embolization with embolization material along the lesser curvature of the stomach, near the splenic hilum, and into the splenic parenchyma. The spleen is enlarged with new geographic areas of hypodensity consistent with large splenic infarct.     2.  Moderate left-sided hydroureteronephrosis is unchanged.     3.  Portal vein, splenic vein, and superior mesenteric vein thrombosis is redemonstrated. Diffuse mesenteric edema is unchanged.     4.  Increased small volume ascites.     5.  Small left pleural effusion and adjacent opacity  consistent with atelectasis.        VENOUS DOPPLER 12/19/2022  Interpretation Summary         Acute deep vein thrombosis in 1 of 2 paired left peroneal veins.    Chronic deep vein thrombosis in the left gastrocnemius vein.    Previous ablation of the left great saphenous vein in the thigh and proximal calf.    All other left sided veins appeared normal.      Assessment & Plan     1.  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  She was worked up at the [facility] at that time with no obvious thrombophilic defect.  She has been anticoagulated with Coumadin for about 20 years but has now been switched to Eliquis.  2.  Positive JAK2 mutation identified in August 2023.  We believe the patient does have a myeloproliferative disorder either ET or polycythemia vera.  Patient is currently on Hydrea 500 mg daily.  2.  Portal hypertension leading to esophageal and rectal varices.  3.  Extensive GI bleeding from varices requiring intervention at [facility] including banding of esophageal varices and rectal varices.  4.  Splenic artery embolization by interventional radiology at [facility] 2/1/2023  5.  Anemia due to acute blood loss and iron deficiency.  6.  Failure to respond to oral iron therapy due to malabsorption.  She has had a good response to 2 weekly doses of IV Injectafer 750 mg each on 3/30/2023 and 4/6/2023 with hemoglobin today at 11.9 g/dL.  7.  Bilateral pulmonary emboli.  Patient will require long-term Eliquis anticoagulation.    PLAN  1.  We again discussed the positive JAK2 mutation and the diagnosis of myeloproliferative disorder.  She seems to be tolerating low-dose Hydrea well at 500 mg daily.  Her platelet count remains elevated but is decreasing and we will leave her dose the same for now.  2.  She is already scheduled for MD follow-up in November with labs drawn 1 week prior to that visit and we will keep that follow-up  appointment.  We discussed today that if her platelet count remains elevated on the next visit we may need to consider increasing her dose of Hydrea.  3.  She will remain on long-term Eliquis.

## 2023-10-10 ENCOUNTER — SPECIALTY PHARMACY (OUTPATIENT)
Dept: PHARMACY | Facility: HOSPITAL | Age: 68
End: 2023-10-10
Payer: MEDICARE

## 2023-11-08 DIAGNOSIS — D75.839 THROMBOCYTOSIS: ICD-10-CM

## 2023-11-08 DIAGNOSIS — K90.9 MALABSORPTION OF IRON: ICD-10-CM

## 2023-11-08 DIAGNOSIS — I82.452 ACUTE DEEP VEIN THROMBOSIS (DVT) OF LEFT PERONEAL VEIN: Primary | ICD-10-CM

## 2023-11-10 ENCOUNTER — LAB (OUTPATIENT)
Dept: OTHER | Facility: HOSPITAL | Age: 68
End: 2023-11-10
Payer: MEDICARE

## 2023-11-10 DIAGNOSIS — K90.9 MALABSORPTION OF IRON: ICD-10-CM

## 2023-11-10 DIAGNOSIS — D75.839 THROMBOCYTOSIS: ICD-10-CM

## 2023-11-10 DIAGNOSIS — I82.452 ACUTE DEEP VEIN THROMBOSIS (DVT) OF LEFT PERONEAL VEIN: ICD-10-CM

## 2023-11-10 LAB
ALBUMIN SERPL-MCNC: 3.8 G/DL (ref 3.5–5.2)
ALBUMIN/GLOB SERPL: 0.9 G/DL
ALP SERPL-CCNC: 153 U/L (ref 39–117)
ALT SERPL W P-5'-P-CCNC: 18 U/L (ref 1–33)
ANION GAP SERPL CALCULATED.3IONS-SCNC: 9.2 MMOL/L (ref 5–15)
AST SERPL-CCNC: 27 U/L (ref 1–32)
BASOPHILS # BLD AUTO: 0.1 10*3/MM3 (ref 0–0.2)
BASOPHILS NFR BLD AUTO: 0.9 % (ref 0–1.5)
BILIRUB SERPL-MCNC: 0.7 MG/DL (ref 0–1.2)
BUN SERPL-MCNC: 16 MG/DL (ref 8–23)
BUN/CREAT SERPL: 18.6 (ref 7–25)
CALCIUM SPEC-SCNC: 9.4 MG/DL (ref 8.6–10.5)
CHLORIDE SERPL-SCNC: 102 MMOL/L (ref 98–107)
CO2 SERPL-SCNC: 26.8 MMOL/L (ref 22–29)
CREAT SERPL-MCNC: 0.86 MG/DL (ref 0.57–1)
D DIMER PPP FEU-MCNC: 6.73 MCGFEU/ML (ref 0–0.66)
DEPRECATED RDW RBC AUTO: 61.6 FL (ref 37–54)
EGFRCR SERPLBLD CKD-EPI 2021: 74.6 ML/MIN/1.73
EOSINOPHIL # BLD AUTO: 0.4 10*3/MM3 (ref 0–0.4)
EOSINOPHIL NFR BLD AUTO: 3.4 % (ref 0.3–6.2)
ERYTHROCYTE [DISTWIDTH] IN BLOOD BY AUTOMATED COUNT: 17.6 % (ref 12.3–15.4)
FERRITIN SERPL-MCNC: 307.5 NG/ML (ref 13–150)
GLOBULIN UR ELPH-MCNC: 4.2 GM/DL
GLUCOSE SERPL-MCNC: 112 MG/DL (ref 65–99)
HCT VFR BLD AUTO: 37.8 % (ref 34–46.6)
HGB BLD-MCNC: 12.4 G/DL (ref 12–15.9)
IMM GRANULOCYTES # BLD AUTO: 0.26 10*3/MM3 (ref 0–0.05)
IMM GRANULOCYTES NFR BLD AUTO: 2.2 % (ref 0–0.5)
IRON 24H UR-MRATE: 72 MCG/DL (ref 37–145)
IRON SATN MFR SERPL: 27 % (ref 20–50)
LYMPHOCYTES # BLD AUTO: 1.64 10*3/MM3 (ref 0.7–3.1)
LYMPHOCYTES NFR BLD AUTO: 14 % (ref 19.6–45.3)
MCH RBC QN AUTO: 31.4 PG (ref 26.6–33)
MCHC RBC AUTO-ENTMCNC: 32.8 G/DL (ref 31.5–35.7)
MCV RBC AUTO: 95.7 FL (ref 79–97)
MONOCYTES # BLD AUTO: 0.99 10*3/MM3 (ref 0.1–0.9)
MONOCYTES NFR BLD AUTO: 8.4 % (ref 5–12)
NEUTROPHILS NFR BLD AUTO: 71.1 % (ref 42.7–76)
NEUTROPHILS NFR BLD AUTO: 8.35 10*3/MM3 (ref 1.7–7)
NRBC BLD AUTO-RTO: 0 /100 WBC (ref 0–0.2)
PLATELET # BLD AUTO: 669 10*3/MM3 (ref 140–450)
PMV BLD AUTO: 10.1 FL (ref 6–12)
POTASSIUM SERPL-SCNC: 4.4 MMOL/L (ref 3.5–5.2)
PROT SERPL-MCNC: 8 G/DL (ref 6–8.5)
RBC # BLD AUTO: 3.95 10*6/MM3 (ref 3.77–5.28)
SODIUM SERPL-SCNC: 138 MMOL/L (ref 136–145)
TIBC SERPL-MCNC: 268 MCG/DL (ref 298–536)
TRANSFERRIN SERPL-MCNC: 180 MG/DL (ref 200–360)
WBC NRBC COR # BLD: 11.74 10*3/MM3 (ref 3.4–10.8)

## 2023-11-10 PROCEDURE — 80053 COMPREHEN METABOLIC PANEL: CPT | Performed by: INTERNAL MEDICINE

## 2023-11-10 PROCEDURE — 36415 COLL VENOUS BLD VENIPUNCTURE: CPT

## 2023-11-10 PROCEDURE — 82728 ASSAY OF FERRITIN: CPT | Performed by: INTERNAL MEDICINE

## 2023-11-10 PROCEDURE — 84466 ASSAY OF TRANSFERRIN: CPT | Performed by: INTERNAL MEDICINE

## 2023-11-10 PROCEDURE — 85025 COMPLETE CBC W/AUTO DIFF WBC: CPT | Performed by: INTERNAL MEDICINE

## 2023-11-10 PROCEDURE — 83540 ASSAY OF IRON: CPT | Performed by: INTERNAL MEDICINE

## 2023-11-10 PROCEDURE — 85379 FIBRIN DEGRADATION QUANT: CPT | Performed by: INTERNAL MEDICINE

## 2023-11-16 ENCOUNTER — TELEPHONE (OUTPATIENT)
Dept: ONCOLOGY | Facility: CLINIC | Age: 68
End: 2023-11-16
Payer: MEDICARE

## 2023-11-16 DIAGNOSIS — R79.9 ABNORMAL FINDING OF BLOOD CHEMISTRY, UNSPECIFIED: Primary | ICD-10-CM

## 2023-11-16 NOTE — TELEPHONE ENCOUNTER
"  Caller: Blanka Mueller \"Lizz\"    Relationship: Self    Best call back number: [phone]     Who are you requesting to speak with (clinical staff, provider,  specific staff member): CLINICAL      What was the call regarding: PATIENT CALLING TO DISCUSS THE POSSIBILITY OF MD JOHNSON ORDERING AN ALP ISOENZYME TEST ON PATIENT LIVER.     PATIENT HAS OTHER ADDITIONAL QUESTIONS REGARDING PREVIOUS VISIT ALSO.  "

## 2023-11-17 ENCOUNTER — OFFICE VISIT (OUTPATIENT)
Dept: ONCOLOGY | Facility: CLINIC | Age: 68
End: 2023-11-17
Payer: MEDICARE

## 2023-11-17 ENCOUNTER — LAB (OUTPATIENT)
Dept: OTHER | Facility: HOSPITAL | Age: 68
End: 2023-11-17
Payer: MEDICARE

## 2023-11-17 VITALS
BODY MASS INDEX: 24.01 KG/M2 | OXYGEN SATURATION: 99 % | RESPIRATION RATE: 16 BRPM | TEMPERATURE: 98.2 F | DIASTOLIC BLOOD PRESSURE: 60 MMHG | HEART RATE: 59 BPM | WEIGHT: 158.4 LBS | SYSTOLIC BLOOD PRESSURE: 94 MMHG | HEIGHT: 68 IN

## 2023-11-17 DIAGNOSIS — R79.9 ABNORMAL FINDING OF BLOOD CHEMISTRY, UNSPECIFIED: ICD-10-CM

## 2023-11-17 DIAGNOSIS — K76.6 PORTAL HYPERTENSION WITH ESOPHAGEAL VARICES: Primary | ICD-10-CM

## 2023-11-17 DIAGNOSIS — I85.00 PORTAL HYPERTENSION WITH ESOPHAGEAL VARICES: Primary | ICD-10-CM

## 2023-11-17 DIAGNOSIS — K55.069 MESENTERIC VEIN THROMBOSIS: ICD-10-CM

## 2023-11-17 DIAGNOSIS — K90.9 MALABSORPTION OF IRON: ICD-10-CM

## 2023-11-17 DIAGNOSIS — D47.1 MYELOPROLIFERATIVE DISORDER: ICD-10-CM

## 2023-11-17 DIAGNOSIS — Z15.89 JAK2 V617F MUTATION: ICD-10-CM

## 2023-11-17 LAB
ALBUMIN SERPL-MCNC: 3.9 G/DL (ref 3.5–5.2)
ALBUMIN/GLOB SERPL: 1 G/DL
ALP SERPL-CCNC: 138 U/L (ref 39–117)
ALT SERPL W P-5'-P-CCNC: 15 U/L (ref 1–33)
ANION GAP SERPL CALCULATED.3IONS-SCNC: 8.6 MMOL/L (ref 5–15)
AST SERPL-CCNC: 27 U/L (ref 1–32)
BILIRUB SERPL-MCNC: 0.8 MG/DL (ref 0–1.2)
BUN SERPL-MCNC: 17 MG/DL (ref 8–23)
BUN/CREAT SERPL: 23.9 (ref 7–25)
CALCIUM SPEC-SCNC: 9.3 MG/DL (ref 8.6–10.5)
CHLORIDE SERPL-SCNC: 104 MMOL/L (ref 98–107)
CO2 SERPL-SCNC: 28.4 MMOL/L (ref 22–29)
CREAT SERPL-MCNC: 0.71 MG/DL (ref 0.57–1)
EGFRCR SERPLBLD CKD-EPI 2021: 93.9 ML/MIN/1.73
GLOBULIN UR ELPH-MCNC: 4.1 GM/DL
GLUCOSE SERPL-MCNC: 92 MG/DL (ref 65–99)
PATHOLOGY REVIEW: YES
POTASSIUM SERPL-SCNC: 4.6 MMOL/L (ref 3.5–5.2)
PROT SERPL-MCNC: 8 G/DL (ref 6–8.5)
SODIUM SERPL-SCNC: 141 MMOL/L (ref 136–145)

## 2023-11-17 PROCEDURE — 84075 ASSAY ALKALINE PHOSPHATASE: CPT | Performed by: INTERNAL MEDICINE

## 2023-11-17 PROCEDURE — 80053 COMPREHEN METABOLIC PANEL: CPT | Performed by: INTERNAL MEDICINE

## 2023-11-17 PROCEDURE — 36415 COLL VENOUS BLD VENIPUNCTURE: CPT

## 2023-11-17 PROCEDURE — 84080 ASSAY ALKALINE PHOSPHATASES: CPT | Performed by: INTERNAL MEDICINE

## 2023-11-17 NOTE — TELEPHONE ENCOUNTER
Called the patient to let her know that I added the labs per Dr. Singh and she was appreciative and v/u.

## 2023-11-17 NOTE — PROGRESS NOTES
Subjective     REASON FOR FOLLOW UP:   1.  History of extensive portal vein thrombosis 20 years ago.    2.  Portal hypertension causing esophageal and rectal varices  3.  Severe GI bleeding from varices  4.  Blood loss anemia  5.  Iron deficiency anemia from chronic GI blood loss  6.  Interventions for severe GI bleeding at [facility] in February 2023 including endoscopic banding of esophageal and rectal varices 1/27/2023 and splenic artery embolization by interventional radiology 2/1/2023  7.  Pulmonary emboli on CT angiogram of the chest 2/10/2023  8.  Anticoagulation with Eliquis 5 mg p.o. twice daily  9.  Lack of response to oral iron therapy due to malabsorption  10.  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  IV iron therapy with 2 doses of Injectafer 750 mg each on 3/30/2023 and 4/6/2023  13.  JAK2 mutation positive.  We believe the patient does indeed have polycythemia vera and she was started on Hydrea therapy 500 mg daily in August 2023      HISTORY OF PRESENT ILLNESS:  The patient is a 68 y.o. year old female who was referred to us from her primary care physician due to a history of extensive portal vein and splenic vein thrombus about 20 years ago.  She has been on anticoagulation ever since with Coumadin.  She had been evaluated at the [facility] in the past and was negative for factor V Leiden mutation or prothrombin gene mutation.  Her IgG and IgM anticardiolipin antibodies were also negative.    She had recently undergone a Doppler study on lower extremities 12/19/2022 and was found to have a peroneal vein DVT in the left lower extremity.  Upon review of some of her recent coag studies she has had INRs that have been subtherapeutic at times but she has remained on Coumadin.  This clot occurred in the setting of recent surgery on the left foot for osteophyte.    After our initial consult visit of 1/17/2023 the patient developed severe GI  bleeding and has had multiple hospitalizations.  She presented to [facility] for admission 1/21/2023 with bright red blood per rectum and at that time she was anticoagulated with Coumadin.  She received fresh frozen plasma and vitamin K and was transfused with red blood cells.  She underwent an EGD performed by Dr. Livia Mccoy of gastroenterology with no obvious bleeding seen.    It was felt that she likely had bleeding from rectal varices and decision was made to transfer the patient to [facility] for further intervention.  She underwent endoscopic banding of both esophageal varices and rectal varices performed by Dr. Hernandez on 1/27/2023.  She then underwent interventional radiology splenic artery embolization on 2/1/2023.    She was discharged from the hospital but developed chest pain and was found to have pulmonary emboli on CT angiogram of the chest 2/10/2023 and was readmitted to [facility] from 2/10/2023 to 2/14/2023.  She was transition from Coumadin to Eliquis and seems to be tolerating the Eliquis well.    Was found to be iron deficient with an iron saturation of 6%.  She received IV iron therapy with 2 doses of IV Injectafer 750 mg each delivered 1 week apart.  She received the first dose on 3/30/2023 and the second dose on 4/6/2023 and tolerated it well.  Follow-up labs on 8/8/2023 showed excellent response. Her hemoglobin was 13 g/dL.  Iron saturation was 27% with a ferritin level of 201.   She continues to have thrombocytosis with a platelet count greater than 700,000 a because of this we did order a JAK2 mutation.    INTERVAL HISTORY:  Her JAK2 mutation was positive and because of this we contacted the patient and had her start on low-dose Hydrea 500 mg daily.  She has been on the Hydrea for about 1 month and seems to be tolerating it well.  Her platelet count remains 700,000 today is slightly lower.  She reports that she has been a little more tired since starting Hydrea but  otherwise seems to be tolerating it well.    She returns today for further follow-up currently Hydrea 500 mg daily.  Labs performed last week 11/10/2023 showed iron status is now normal.  Her white count and platelet count remain elevated today with a platelet count of 669,000.  We plan to increase her Hydrea dosage from 500 mg daily to 500 mg alternating with 1000 mg daily.      History of Present Illness     Past Medical History:   Diagnosis Date    Acute torn meniscus     Right    Ankle sprain 1967    many times as a kid    Arthritis     DEGENERATIVE HANDS AND FEET    Arthritis of back     Arthritis of neck 2012    femoral neck in osteopenic range    CTS (carpal tunnel syndrome) 2011    or degenerative arthritis    Difficulty urinating     Dry eyes     Eczema     Esophageal varices     Fracture, foot 2021    not a fracture but you provide no space for my feet problem of arthritus/bunions/constant pain/flat footed    Gastritis     Great toe pain     Hiatal hernia     History of dizziness     Hypertension     Knee swelling 2020    Legal blindness     LEFT EYE    Low back strain 1980    not as bad now    Mesenteric venous thrombosis     PORTAL AND SPLENIC    Pain     ABD WITH CERTAIN FOOD    Pelvic pressure in female     WITH CERTAIN MOVEMENTS AND WHEN SITTING    Portal hypertension     Portal vein thrombosis     Splenic vein thrombosis     Splenomegaly     Tear of meniscus of knee 2020    R knee osteoarthritis/torn Mesiscus 2 places    Thyroid disease     Ureter, stricture     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 AND BEING FOLLOWED BY MICHELLE DESIR MD        Past Surgical History:   Procedure Laterality Date    APPENDECTOMY N/A 1985    COLONOSCOPY N/A 2019    CYSTOSCOPY Bilateral 10/14/2020    Procedure: CYSTOSCOPY RETROGRADE PYELOGRAM;  Surgeon: Jaiden Desir MD;  Location: [facility];  Service: Urology;  Laterality: Bilateral;    CYSTOSCOPY W/ URETERAL STENT REMOVAL      ENDOSCOPY N/A 2019    ENDOSCOPY Left 1/23/2023     Procedure: ESOPHAGOGASTRODUODENOSCOPY AT BEDSIDE;  Surgeon: Livia Mcmanus MD;  Location: [facility] ENDOSCOPY;  Service: Gastroenterology;  Laterality: Left;  PRE- HEMATOCHEZIA, ESOPHAGEAL VARICES, GASTRIC VARIX    EXPLORATORY LAPAROTOMY N/A 2001    EXPLORATORY LAPAROTOMY      ORIGINALLY LOOKING FOR OVARIAN CANCER, WAS NOT FOUND.  FOUND MESENTERIC/PORTAL/SPLENIC VENOUS THROMBOSIS    FOOT FUSION Left 10/25/2022    Procedure: Left first metatarsal phalangeal joint fusion;  Surgeon: Ulises Fairchild MD;  Location: [facility] OR OSC;  Service: Orthopedics;  Laterality: Left;    HERNIA REPAIR N/A     INGUINAL HERNIA REPAIR Left 01/20/2021    Procedure: open ventral hernia repair with mesh;  Surgeon: Yoanna Jacobo MD;  Location: [facility] MAIN OR;  Service: General;  Laterality: Left;    LAPAROSCOPIC CHOLECYSTECTOMY N/A 2001    TONSILLECTOMY  1971    URETERAL STENT INSERTION          Current Outpatient Medications on File Prior to Visit   Medication Sig Dispense Refill    ascorbic acid (VITAMIN C) 500 MG tablet Take 2 tablets by mouth Daily. PT HOLDING FOR SURGERY      calcium carbonate (TUMS) 500 MG chewable tablet Chew 1 tablet Daily.      Cholecalciferol (Vitamin D3) 50 MCG (2000 UT) capsule Take 1 capsule by mouth Daily.      Eliquis 5 MG tablet tablet Take 1 tablet by mouth Every 12 (Twelve) Hours.      hydroxyurea (HYDREA) 500 MG capsule Take 1 capsule by mouth Daily. 90 capsule 3    hydrOXYzine (ATARAX) 25 MG tablet Take 1-2 tablets by mouth As Needed.      levothyroxine (SYNTHROID, LEVOTHROID) 50 MCG tablet Take 1 tablet by mouth Daily.      Magnesium Oxide 400 (240 Mg) MG tablet Take 1 tablet by mouth Daily.      melatonin 3 MG tablet Take 1 tablet by mouth At Night As Needed for Sleep. 30 tablet     multivitamin (THERAGRAN) tablet tablet Take 1 tablet by mouth Daily. PT HOLDING FOR SURGERY      nadolol (CORGARD) 20 MG tablet Take 1.5 tablets by mouth Every Morning.      Probiotic Product (PROBIOTIC DAILY  PO) Take 1 capsule by mouth Every Night.      triamcinolone (KENALOG) 0.1 % cream Apply 1 application  topically to the appropriate area as directed As Needed.      Zinc 100 MG tablet Take 200 tablet/day by mouth.       No current facility-administered medications on file prior to visit.        ALLERGIES:    Allergies   Allergen Reactions    Contrast Dye (Echo Or Unknown Ct/Mr) Swelling     FACIAL    Diclofenac Other (See Comments)     Bad heartburn causing pressure in the chest    Aspartame Itching    Morphine Headache    Heparin Unknown - Low Severity     HIT 2/11 per Dr. Neetu Dai    Lamisil [Terbinafine] Swelling     JOINT PAIN, REDNESS    Dexamethasone GI Intolerance    Prednisone Rash and GI Intolerance     CAN TAKE IF NEEDED        Social History     Socioeconomic History    Marital status:     Number of children: 3   Tobacco Use    Smoking status: Never    Smokeless tobacco: Never   Vaping Use    Vaping Use: Never used   Substance and Sexual Activity    Alcohol use: Yes     Comment: glass of wine a month with dinner    Drug use: Never    Sexual activity: Never        Family History   Problem Relation Age of Onset    Osteoporosis Mother     Heart disease Father     Tuberculosis Father     Breast cancer Sister     Cancer Sister         lymphnodes    Cancer Sister         pallete    Colon cancer Paternal Grandfather     Malig Hyperthermia Neg Hx         Review of Systems   Constitutional:  Positive for fatigue and unexpected weight change. Negative for activity change, appetite change, chills and fever.   HENT:  Negative for hearing loss, mouth sores, nosebleeds, trouble swallowing and voice change.    Eyes:  Negative for pain and visual disturbance.   Respiratory:  Positive for cough. Negative for shortness of breath and wheezing.    Cardiovascular:  Negative for chest pain and palpitations.   Gastrointestinal:  Positive for abdominal distention and diarrhea. Negative for abdominal pain, constipation,  "nausea and vomiting.   Genitourinary:  Negative for difficulty urinating, frequency, hematuria and urgency.   Musculoskeletal:  Negative for arthralgias, back pain, joint swelling and neck pain.   Skin:  Negative for rash.   Neurological:  Positive for weakness. Negative for dizziness, seizures, syncope and headaches.   Hematological:  Negative for adenopathy. Bruises/bleeds easily.   Psychiatric/Behavioral:  Negative for behavioral problems and confusion. The patient is not nervous/anxious.         Objective     Vitals:    11/17/23 1108   BP: 94/60   Pulse: 59   Resp: 16   Temp: 98.2 °F (36.8 °C)   TempSrc: Temporal   SpO2: 99%   Weight: 71.8 kg (158 lb 6.4 oz)   Height: 172 cm (67.72\")   PainSc: 0-No pain         11/17/2023    11:12 AM   Current Status   ECOG score 0       Physical Exam  Constitutional:       General: She is not in acute distress.     Appearance: She is well-developed. She is ill-appearing.   HENT:      Head: Normocephalic.   Eyes:      General: No scleral icterus.     Conjunctiva/sclera: Conjunctivae normal.      Pupils: Pupils are equal, round, and reactive to light.   Neck:      Thyroid: No thyromegaly.      Vascular: No JVD.   Cardiovascular:      Rate and Rhythm: Normal rate and regular rhythm.      Heart sounds: No murmur heard.     No friction rub. No gallop.   Pulmonary:      Effort: Pulmonary effort is normal.      Breath sounds: Normal breath sounds. No wheezing or rales.   Abdominal:      General: There is distension.      Tenderness: There is abdominal tenderness.      Comments: Abdomen is firm and distended   Musculoskeletal:         General: No deformity. Normal range of motion.      Cervical back: Normal range of motion and neck supple.   Lymphadenopathy:      Cervical: No cervical adenopathy.   Skin:     General: Skin is warm and dry.      Findings: No erythema or rash.   Neurological:      Mental Status: She is alert and oriented to person, place, and time.      Cranial Nerves: No " cranial nerve deficit.      Deep Tendon Reflexes: Reflexes are normal and symmetric.   Psychiatric:         Behavior: Behavior normal.         Judgment: Judgment normal.           RECENT LABS:  Hematology WBC   Date Value Ref Range Status   11/10/2023 11.74 (H) 3.40 - 10.80 10*3/mm3 Final     RBC   Date Value Ref Range Status   11/10/2023 3.95 3.77 - 5.28 10*6/mm3 Final     Hemoglobin   Date Value Ref Range Status   11/10/2023 12.4 12.0 - 15.9 g/dL Final     Hematocrit   Date Value Ref Range Status   11/10/2023 37.8 34.0 - 46.6 % Final     Platelets   Date Value Ref Range Status   11/10/2023 669 (H) 140 - 450 10*3/mm3 Final        RA 2 mutation ([id number]) POSITIVE    Lab Results   Component Value Date    GLUCOSE 112 (H) 11/10/2023    CALCIUM 9.4 11/10/2023     11/10/2023    K 4.4 11/10/2023    CO2 26.8 11/10/2023     11/10/2023    BUN 16 11/10/2023    CREATININE 0.86 11/10/2023    EGFRIFAFRI 99 07/06/2022    EGFRIFNONA 86 07/06/2022    BCR 18.6 11/10/2023    ANIONGAP 9.2 11/10/2023     Lab Results   Component Value Date    IRON 72 11/10/2023    TIBC 268 (L) 11/10/2023    FERRITIN 307.50 (H) 11/10/2023   SAT 27%    CT Chest Pulmonary Angiogram   2/10/2023 5:12 PM   IMPRESSION:   1.  Segmental pulmonary emboli are identified within the right upper and lower pulmonary arteries. No evidence of right heart strain.     2.  Small/moderate volume left-sided pleural fluid collection. There is bibasilar atelectasis, left side worse than right.     3.  Small low-attenuation left thyroid lobe nodule. If not previously assessed, nonemergent/outpatient thyroid sonography should be considered for complete evaluation.     4.  Please refer to same-day CT abdomen pelvis for characterization of abdominopelvic findings.     CT Abdomen Pelvis W   2/10/2023 5:12 PM   IMPRESSION:   1.  Interval changes from splenic artery embolization with embolization material along the lesser curvature of the stomach, near the splenic hilum,  and into the splenic parenchyma. The spleen is enlarged with new geographic areas of hypodensity consistent with large splenic infarct.     2.  Moderate left-sided hydroureteronephrosis is unchanged.     3.  Portal vein, splenic vein, and superior mesenteric vein thrombosis is redemonstrated. Diffuse mesenteric edema is unchanged.     4.  Increased small volume ascites.     5.  Small left pleural effusion and adjacent opacity consistent with atelectasis.        VENOUS DOPPLER 12/19/2022  Interpretation Summary         Acute deep vein thrombosis in 1 of 2 paired left peroneal veins.    Chronic deep vein thrombosis in the left gastrocnemius vein.    Previous ablation of the left great saphenous vein in the thigh and proximal calf.    All other left sided veins appeared normal.      Assessment & Plan     1.  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  She was worked up at the [facility] at that time with no obvious thrombophilic defect.  She has been anticoagulated with Coumadin for about 20 years but has now been switched to Eliquis.  2.  Positive JAK2 mutation identified in August 2023.  We believe the patient does have a myeloproliferative disorder either ET or polycythemia vera.  Patient is currently on Hydrea 500 mg daily.  2.  Portal hypertension leading to esophageal and rectal varices.  3.  Extensive GI bleeding from varices requiring intervention at [facility] including banding of esophageal varices and rectal varices.  4.  Splenic artery embolization by interventional radiology at [facility] 2/1/2023  5.  Anemia due to acute blood loss and iron deficiency.  6.  Failure to respond to oral iron therapy due to malabsorption.  She has had a good response to 2 weekly doses of IV Injectafer 750 mg each on 3/30/2023 and 4/6/2023 with hemoglobin now  12.4 g/dL.  7.  Bilateral pulmonary emboli.  Patient will require long-term Eliquis  anticoagulation.    PLAN  1.  We again discussed the positive JAK2 mutation and the diagnosis of myeloproliferative disorder.  She seems to be tolerating low-dose Hydrea well at 500 mg daily.  Her platelet count remains elevated and we recommended increasing her dose of Hydrea to 500 mg alternating with 1000 mg daily.    2.  We will have her return for labs every 4 weeks to check her CBC and further adjust her Hydrea dosage as needed.  3.  MD follow-up in 3 months with additional labs including repeat iron panel and ferritin and serum chemistries.  4.  The patient had requested that we perform alkaline phosphatase isoenzymes today and this study was drawn but is pending.  3.  She will remain on long-term Eliquis.

## 2023-11-20 LAB
ALP BONE CFR SERPL: 30 % (ref 14–68)
ALP INTEST CFR SERPL: 3 % (ref 0–18)
ALP LIVER CFR SERPL: 67 % (ref 18–85)
ALP SERPL-CCNC: 133 IU/L (ref 44–121)

## 2023-11-21 ENCOUNTER — SPECIALTY PHARMACY (OUTPATIENT)
Dept: PHARMACY | Facility: HOSPITAL | Age: 68
End: 2023-11-21
Payer: MEDICARE

## 2023-11-21 LAB
LAB AP CASE REPORT: NORMAL
PATH REPORT.FINAL DX SPEC: NORMAL

## 2023-11-21 RX ORDER — HYDROXYUREA 500 MG/1
CAPSULE ORAL
Qty: 135 CAPSULE | Refills: 1 | Status: SHIPPED | OUTPATIENT
Start: 2023-11-21

## 2023-11-21 NOTE — PROGRESS NOTES
Re: Refills of Oral Specialty Medication - Hydrea (hydroxyurea)    Drug-Drug Interactions: The current medication list was reviewed and there are no relevant drug-drug interactions with the specialty medication.  Medication Allergies: The patient has no relevant allergies as it relates to their oral specialty medication  Review of Labs/Dose Adjustments: DOSE CHANGE - I reviewed the most recent note and labs. Due to elevated platelets the dose is being increased. I sent refills as described below.    Drug: Hydrea (hydroxyurea)  Strength: 500 mg  Directions: Take 1 capsule by mouth  alternating with 2 capsules daily  Quantity: 135  Refills: 1  Pharmacy prescription sent to: [facility]    Name/Credentials: Oc Ramsey, AlexandraD, BCOP  Clinical Oncology Pharmacist    11/21/2023  12:59 EST

## 2023-12-01 ENCOUNTER — TELEPHONE (OUTPATIENT)
Dept: ONCOLOGY | Facility: CLINIC | Age: 68
End: 2023-12-01
Payer: MEDICARE

## 2023-12-01 NOTE — TELEPHONE ENCOUNTER
"  Caller: Blanka Mueller \"Lizz\"    Relationship: Self    Best call back number: [phone]     What is the best time to reach you: ASAP     Who are you requesting to speak with (clinical staff, provider,  specific staff member): CLINICAL    What was the call regarding: PT NEEDS RESULTS FROM NOV.17 CBC BEFORE SHE SEES HER PRIMARY CARE THIS MORNING IN ABOUT 30 MINS, HUB UNABLE TO WARM TRANSFER, PLEASE CALL PT TO ADVISE ON CBC RESULT, THIS WAS THE ONLY ONE NOT SHOWING IN MYCHART.  OR PUT ON MYCHART FOR HER TO SEE.      "

## 2023-12-01 NOTE — TELEPHONE ENCOUNTER
Called the patient and left a message stating there was not a CBC done on 11/17 but that I faxed the 11/10 one to her PCP. She did not answer but a v/m was left.

## 2023-12-15 ENCOUNTER — LAB (OUTPATIENT)
Dept: OTHER | Facility: HOSPITAL | Age: 68
End: 2023-12-15
Payer: MEDICARE

## 2023-12-15 ENCOUNTER — CLINICAL SUPPORT (OUTPATIENT)
Dept: ONCOLOGY | Facility: HOSPITAL | Age: 68
End: 2023-12-15
Payer: MEDICARE

## 2023-12-15 DIAGNOSIS — K76.6 PORTAL HYPERTENSION WITH ESOPHAGEAL VARICES: ICD-10-CM

## 2023-12-15 DIAGNOSIS — K55.069 MESENTERIC VEIN THROMBOSIS: ICD-10-CM

## 2023-12-15 DIAGNOSIS — Z15.89 JAK2 V617F MUTATION: ICD-10-CM

## 2023-12-15 DIAGNOSIS — K90.9 MALABSORPTION OF IRON: ICD-10-CM

## 2023-12-15 DIAGNOSIS — D47.1 MYELOPROLIFERATIVE DISORDER: ICD-10-CM

## 2023-12-15 DIAGNOSIS — I85.00 PORTAL HYPERTENSION WITH ESOPHAGEAL VARICES: ICD-10-CM

## 2023-12-15 LAB
BASOPHILS # BLD AUTO: 0.11 10*3/MM3 (ref 0–0.2)
BASOPHILS NFR BLD AUTO: 1.3 % (ref 0–1.5)
DEPRECATED RDW RBC AUTO: 56.1 FL (ref 37–54)
EOSINOPHIL # BLD AUTO: 0.42 10*3/MM3 (ref 0–0.4)
EOSINOPHIL NFR BLD AUTO: 4.8 % (ref 0.3–6.2)
ERYTHROCYTE [DISTWIDTH] IN BLOOD BY AUTOMATED COUNT: 15.8 % (ref 12.3–15.4)
HCT VFR BLD AUTO: 36.2 % (ref 34–46.6)
HGB BLD-MCNC: 12.2 G/DL (ref 12–15.9)
IMM GRANULOCYTES # BLD AUTO: 0.05 10*3/MM3 (ref 0–0.05)
IMM GRANULOCYTES NFR BLD AUTO: 0.6 % (ref 0–0.5)
LYMPHOCYTES # BLD AUTO: 1.53 10*3/MM3 (ref 0.7–3.1)
LYMPHOCYTES NFR BLD AUTO: 17.5 % (ref 19.6–45.3)
MCH RBC QN AUTO: 32.7 PG (ref 26.6–33)
MCHC RBC AUTO-ENTMCNC: 33.7 G/DL (ref 31.5–35.7)
MCV RBC AUTO: 97.1 FL (ref 79–97)
MONOCYTES # BLD AUTO: 0.82 10*3/MM3 (ref 0.1–0.9)
MONOCYTES NFR BLD AUTO: 9.4 % (ref 5–12)
NEUTROPHILS NFR BLD AUTO: 5.8 10*3/MM3 (ref 1.7–7)
NEUTROPHILS NFR BLD AUTO: 66.4 % (ref 42.7–76)
NRBC BLD AUTO-RTO: 0 /100 WBC (ref 0–0.2)
PLATELET # BLD AUTO: 423 10*3/MM3 (ref 140–450)
PMV BLD AUTO: 10.4 FL (ref 6–12)
RBC # BLD AUTO: 3.73 10*6/MM3 (ref 3.77–5.28)
WBC NRBC COR # BLD AUTO: 8.73 10*3/MM3 (ref 3.4–10.8)

## 2023-12-15 PROCEDURE — 85025 COMPLETE CBC W/AUTO DIFF WBC: CPT | Performed by: INTERNAL MEDICINE

## 2023-12-15 NOTE — NURSING NOTE
Patient is here for lab review with RN.  Pt did not stay for lab results. CBC reviewed, counts are stable for this patient at this time. Pt currently taking Hydrea 500mg daily alternating with 1000mg. Reviewed with Dr. Singh. Per Dr. Singh, pt to continue on this dosing regimen. Called pt to review CBC results and directed her to stay on current Hydrea dosing per Dr. Singh. She vu. Instructed pt to call the office with any concerns or new symptoms prior to next visit. She radha.    Lab Results   Component Value Date    WBC 8.73 12/15/2023    HGB 12.2 12/15/2023    HCT 36.2 12/15/2023    MCV 97.1 (H) 12/15/2023     12/15/2023

## 2024-01-12 ENCOUNTER — LAB (OUTPATIENT)
Dept: OTHER | Facility: HOSPITAL | Age: 69
End: 2024-01-12
Payer: MEDICARE

## 2024-01-12 ENCOUNTER — CLINICAL SUPPORT (OUTPATIENT)
Dept: ONCOLOGY | Facility: HOSPITAL | Age: 69
End: 2024-01-12
Payer: MEDICARE

## 2024-01-12 DIAGNOSIS — K90.9 MALABSORPTION OF IRON: ICD-10-CM

## 2024-01-12 DIAGNOSIS — Z15.89 JAK2 V617F MUTATION: ICD-10-CM

## 2024-01-12 DIAGNOSIS — K76.6 PORTAL HYPERTENSION WITH ESOPHAGEAL VARICES: ICD-10-CM

## 2024-01-12 DIAGNOSIS — I85.00 PORTAL HYPERTENSION WITH ESOPHAGEAL VARICES: ICD-10-CM

## 2024-01-12 DIAGNOSIS — K55.069 MESENTERIC VEIN THROMBOSIS: ICD-10-CM

## 2024-01-12 DIAGNOSIS — D47.1 MYELOPROLIFERATIVE DISORDER: ICD-10-CM

## 2024-01-12 LAB
BASOPHILS # BLD AUTO: 0.1 10*3/MM3 (ref 0–0.2)
BASOPHILS NFR BLD AUTO: 1.2 % (ref 0–1.5)
DEPRECATED RDW RBC AUTO: 53.5 FL (ref 37–54)
EOSINOPHIL # BLD AUTO: 0.44 10*3/MM3 (ref 0–0.4)
EOSINOPHIL NFR BLD AUTO: 5.4 % (ref 0.3–6.2)
ERYTHROCYTE [DISTWIDTH] IN BLOOD BY AUTOMATED COUNT: 15 % (ref 12.3–15.4)
HCT VFR BLD AUTO: 36.2 % (ref 34–46.6)
HGB BLD-MCNC: 12.4 G/DL (ref 12–15.9)
IMM GRANULOCYTES # BLD AUTO: 0.04 10*3/MM3 (ref 0–0.05)
IMM GRANULOCYTES NFR BLD AUTO: 0.5 % (ref 0–0.5)
LYMPHOCYTES # BLD AUTO: 1.52 10*3/MM3 (ref 0.7–3.1)
LYMPHOCYTES NFR BLD AUTO: 18.6 % (ref 19.6–45.3)
MCH RBC QN AUTO: 33.7 PG (ref 26.6–33)
MCHC RBC AUTO-ENTMCNC: 34.3 G/DL (ref 31.5–35.7)
MCV RBC AUTO: 98.4 FL (ref 79–97)
MONOCYTES # BLD AUTO: 0.77 10*3/MM3 (ref 0.1–0.9)
MONOCYTES NFR BLD AUTO: 9.4 % (ref 5–12)
NEUTROPHILS NFR BLD AUTO: 5.31 10*3/MM3 (ref 1.7–7)
NEUTROPHILS NFR BLD AUTO: 64.9 % (ref 42.7–76)
NRBC BLD AUTO-RTO: 0 /100 WBC (ref 0–0.2)
PLATELET # BLD AUTO: 404 10*3/MM3 (ref 140–450)
PMV BLD AUTO: 10.7 FL (ref 6–12)
RBC # BLD AUTO: 3.68 10*6/MM3 (ref 3.77–5.28)
WBC NRBC COR # BLD AUTO: 8.18 10*3/MM3 (ref 3.4–10.8)

## 2024-01-12 PROCEDURE — 85025 COMPLETE CBC W/AUTO DIFF WBC: CPT | Performed by: INTERNAL MEDICINE

## 2024-01-12 NOTE — NURSING NOTE
Patient is here for lab review with RN and requested that we call with results.  CBC reviewed, counts are stable for this patient at this time. Patient has no complaints. Pt confirmed that she is taking 500 mg Hydrea daily with alternating 1000 mg. Future follow up appointment reviewed. Patient was instructed to call the office with any concerns or new symptoms prior to next visit. Patient verbalized understanding.   Lab Results   Component Value Date    WBC 8.18 01/12/2024    HGB 12.4 01/12/2024    HCT 36.2 01/12/2024    MCV 98.4 (H) 01/12/2024     01/12/2024

## 2024-01-18 NOTE — TELEPHONE ENCOUNTER
"  Caller: Blanka Mueller \"Anne Marie Holder\"    Relationship: Self    Best call back number: [phone]    What is the best time to reach you: ANYTIME    Who are you requesting to speak with (clinical staff, provider,  specific staff member): SCHEDULING     What was the call regarding: PT CHECKING THE STATUS OF HER IRON INFUSIONS TO SEE IF MEDICARE WILL COVER THEM ALONG ANTHEM PT TRYING TO GET HER INFUSIONS MOVED UP    Do you require a callback: YES PLEASE            " Gastroenterology Clinic Note    Patient ID: [id number]   Referring MD: All Liu MD   Chief Complaint:   Chief Complaint   Patient presents with    Hiatal Hernia       History of Present Illness   Leti Landeros is an 60 y.o. female who is referred for GERD. Patient complains of epigastric pain and discomfort. She reports acid reflux that is not improved with Nexium 40 mg daily. She was changed from Protonix 40 mg daily 6-8 months ago without improvement. She denies dysphagia. Reports sour belching. Has been seen by ENT Dr Liu for hoarseness and voice change. She is on Mounjaro and Jardiance for her DM. Reports EGD many years ago with hiatal hernia and inflammation in her stomach.     Previous workup:ENT evaluation    Last colonoscopy was at [place].    Review of Systems   Constitutional:  Negative for weight loss.   Gastrointestinal:  Positive for abdominal pain, constipation, heartburn and nausea. Negative for blood in stool, diarrhea, melena and vomiting.       Past Medical History      Past Medical History:   Diagnosis Date    Diabetes mellitus     Fibromyalgia     Hypertension     Scleroderma     Sinusitis        Past Surgical History     Past Surgical History:   Procedure Laterality Date     SECTION      DILATION AND CURETTAGE OF UTERUS      TONSILLECTOMY, ADENOIDECTOMY         Allergies     Review of patient's allergies indicates:   Allergen Reactions    Adhesive tape-silicones     Corticosteroids (glucocorticoids)     Lincocin [lincomycin]     Promethazine hcl     Sulfa (sulfonamide antibiotics)      Patient is also allergic to an unknown steroid drug    Methocarbamol Nausea And Vomiting       Immunization History     There is no immunization history on file for this patient.    Past Family History      Family History   Problem Relation Age of Onset    Diabetes Mother     Cancer Father        Past Social History      Social History     Socioeconomic History    Marital status:     Tobacco Use    Smoking status: Never     Passive exposure: Never    Smokeless tobacco: Never   Substance and Sexual Activity    Alcohol use: Not Currently    Drug use: Never       Current Medications     Outpatient Medications Marked as Taking for the 1/18/24 encounter (Office Visit) with Diana Crane FNP   Medication Sig Dispense Refill    aluminum chloride (DRYSOL DAB-O-MATIC) 20 % external solution Apply to underarms daily 60 mL 11    atenoloL-chlorthalidone (TENORETIC) 50-25 mg Tab Take 1 tablet by mouth once daily.      chlorhexidine (HIBICLENS) 4 % external liquid Use as a daily wash to affected areas 473 mL 11    clindamycin (CLEOCIN T) 1 % lotion Apply thin layer to affected areas daily after shower 60 mL 11    empagliflozin (JARDIANCE) 25 mg tablet Take 25 mg by mouth once daily.      ergocalciferol (ERGOCALCIFEROL) 50,000 unit Cap Take 50,000 Units by mouth every 7 days.      esomeprazole (NEXIUM) 40 MG capsule Take 40 mg by mouth once daily.      HYDROcodone-acetaminophen (NORCO)  mg per tablet       meclizine (ANTIVERT) 25 mg tablet Take 1 tablet (25 mg total) by mouth 3 (three) times daily as needed for Dizziness. 30 tablet 2    MOUNJARO 15 mg/0.5 mL PnIj Inject 15 mg into the skin once a week.      potassium citrate (UROCIT-K) 10 mEq (1,080 mg) TbSR Take 20 mEq by mouth.      tamsulosin (FLOMAX) 0.4 mg Cap Take 0.4 mg by mouth.       Current Facility-Administered Medications for the 1/18/24 encounter (Office Visit) with Diana Crane FNP   Medication Dose Route Frequency Provider Last Rate Last Admin    [COMPLETED] ketorolac injection 30 mg  30 mg Intramuscular 1 time in Clinic/HOD Emmie Gaviria MD   30 mg at 01/17/24 1701        I have reviewed the current medications, allergies, vital signs, past medical and surgical history, family medical history, and social history for this encounter and agree with all findings.    OBJECTIVE    Physical Exam    /69   Pulse 73   Ht 5'  "4.5" (1.638 m)   Wt 67.1 kg (148 lb)   LMP 03/15/2022 (Approximate) Comment: patient states she has irregular periods d/t menopause  SpO2 99%   BMI 25.01 kg/m²   GEN: Well appearing, cooperative, NAD  NECK: Supple, no LAD  CV: Normal rate  RESP: Unlabored  ABD: ND, no guarding  EXT: No clubbing, cyanosis, or edema  SKIN: Warm and dry  NEURO: AAO x4.     LABS    CBC (with or without Differential):   Lab Results   Component Value Date    WBC 9.49 01/17/2024    HGB 15.0 01/17/2024    HCT 47.7 (H) 01/17/2024    MCV 86.3 01/17/2024    MCH 27.1 01/17/2024    MCHC 31.4 (L) 01/17/2024    RDW 14.5 01/17/2024     01/17/2024    MPV 10.6 01/17/2024    NEUTOPHILPCT 47.1 (L) 01/17/2024    DIFFTYPE Auto 01/17/2024     BMP/CMP:   Lab Results   Component Value Date     01/17/2024    K 2.9 (L) 01/17/2024     01/17/2024    CO2 30 01/17/2024    BUN 16 01/17/2024    CREATININE 1.01 01/17/2024     (H) 01/17/2024    CALCIUM 10.6 (H) 01/17/2024    ALBUMIN 4.1 01/17/2024    AST 17 01/17/2024    ALT 36 01/17/2024    ALKPHOS 69 01/17/2024        IMAGING  No pertinent imaging    ASSESSMENT  Leti Landeros is a 60 y.o. female with PMH significant for DM, HTN, Fibromyalgia, and GERD who is referred for GERD.    1. Gastroesophageal reflux disease, unspecified whether esophagitis present    2. Epigastric pain    3. Hiatal hernia           PLAN    - EGD for epigastric pain, GERD that is not responding to PPI change   - Consider GE study at follow-up to rule out gastroparesis   - KRISTEL for GI records from [facility]  There are no Patient Instructions on file for this visit.      No orders of the defined types were placed in this encounter.      The risks and benefits of my recommendations, as well as other treatment options were discussed with the patient today. All questions were answered.    45 minutes of total time spent on the encounter, which includes face to face time and non-face to face time preparing to see the " patient (eg, review of tests), obtaining and/or reviewing separately obtained history, documenting clinical information in the electronic or other health record, Independently interpreting results (not separately reported) and communicating results to the patient/family/caregiver, or care coordination (not separately reported).        Diana Crane, FNP/ACNP  [facility]

## 2024-02-09 ENCOUNTER — OFFICE VISIT (OUTPATIENT)
Dept: ONCOLOGY | Facility: CLINIC | Age: 69
End: 2024-02-09
Payer: MEDICARE

## 2024-02-09 ENCOUNTER — LAB (OUTPATIENT)
Dept: OTHER | Facility: HOSPITAL | Age: 69
End: 2024-02-09
Payer: MEDICARE

## 2024-02-09 ENCOUNTER — SPECIALTY PHARMACY (OUTPATIENT)
Dept: PHARMACY | Facility: HOSPITAL | Age: 69
End: 2024-02-09
Payer: MEDICARE

## 2024-02-09 VITALS
HEART RATE: 59 BPM | HEIGHT: 68 IN | DIASTOLIC BLOOD PRESSURE: 69 MMHG | TEMPERATURE: 98 F | WEIGHT: 162 LBS | SYSTOLIC BLOOD PRESSURE: 103 MMHG | RESPIRATION RATE: 16 BRPM | BODY MASS INDEX: 24.55 KG/M2 | OXYGEN SATURATION: 97 %

## 2024-02-09 DIAGNOSIS — I85.00 PORTAL HYPERTENSION WITH ESOPHAGEAL VARICES: ICD-10-CM

## 2024-02-09 DIAGNOSIS — Z15.89 JAK2 V617F MUTATION: ICD-10-CM

## 2024-02-09 DIAGNOSIS — D47.1 MYELOPROLIFERATIVE DISORDER: ICD-10-CM

## 2024-02-09 DIAGNOSIS — K76.6 PORTAL HYPERTENSION WITH ESOPHAGEAL VARICES: ICD-10-CM

## 2024-02-09 DIAGNOSIS — K90.9 MALABSORPTION OF IRON: ICD-10-CM

## 2024-02-09 DIAGNOSIS — K55.069 MESENTERIC VEIN THROMBOSIS: ICD-10-CM

## 2024-02-09 DIAGNOSIS — D75.839 THROMBOCYTOSIS: Primary | ICD-10-CM

## 2024-02-09 DIAGNOSIS — D50.0 IRON DEFICIENCY ANEMIA DUE TO CHRONIC BLOOD LOSS: ICD-10-CM

## 2024-02-09 LAB
ALBUMIN SERPL-MCNC: 3.9 G/DL (ref 3.5–5.2)
ALBUMIN/GLOB SERPL: 1.1 G/DL
ALP SERPL-CCNC: 124 U/L (ref 39–117)
ALT SERPL W P-5'-P-CCNC: 20 U/L (ref 1–33)
ANION GAP SERPL CALCULATED.3IONS-SCNC: 8.8 MMOL/L (ref 5–15)
AST SERPL-CCNC: 30 U/L (ref 1–32)
BASOPHILS # BLD AUTO: 0.09 10*3/MM3 (ref 0–0.2)
BASOPHILS NFR BLD AUTO: 1.2 % (ref 0–1.5)
BILIRUB SERPL-MCNC: 0.6 MG/DL (ref 0–1.2)
BUN SERPL-MCNC: 17 MG/DL (ref 8–23)
BUN/CREAT SERPL: 21.8 (ref 7–25)
CALCIUM SPEC-SCNC: 9.1 MG/DL (ref 8.6–10.5)
CHLORIDE SERPL-SCNC: 106 MMOL/L (ref 98–107)
CO2 SERPL-SCNC: 27.2 MMOL/L (ref 22–29)
CREAT SERPL-MCNC: 0.78 MG/DL (ref 0.57–1)
DEPRECATED RDW RBC AUTO: 54.8 FL (ref 37–54)
EGFRCR SERPLBLD CKD-EPI 2021: 83.9 ML/MIN/1.73
EOSINOPHIL # BLD AUTO: 0.32 10*3/MM3 (ref 0–0.4)
EOSINOPHIL NFR BLD AUTO: 4.4 % (ref 0.3–6.2)
ERYTHROCYTE [DISTWIDTH] IN BLOOD BY AUTOMATED COUNT: 14.6 % (ref 12.3–15.4)
FERRITIN SERPL-MCNC: 265.7 NG/ML (ref 13–150)
GLOBULIN UR ELPH-MCNC: 3.7 GM/DL
GLUCOSE SERPL-MCNC: 106 MG/DL (ref 65–99)
HCT VFR BLD AUTO: 36.2 % (ref 34–46.6)
HGB BLD-MCNC: 12 G/DL (ref 12–15.9)
IMM GRANULOCYTES # BLD AUTO: 0.05 10*3/MM3 (ref 0–0.05)
IMM GRANULOCYTES NFR BLD AUTO: 0.7 % (ref 0–0.5)
IRON 24H UR-MRATE: 94 MCG/DL (ref 37–145)
IRON SATN MFR SERPL: 36 % (ref 20–50)
LDH SERPL-CCNC: 153 U/L (ref 135–214)
LYMPHOCYTES # BLD AUTO: 1.6 10*3/MM3 (ref 0.7–3.1)
LYMPHOCYTES NFR BLD AUTO: 22.1 % (ref 19.6–45.3)
MCH RBC QN AUTO: 34 PG (ref 26.6–33)
MCHC RBC AUTO-ENTMCNC: 33.1 G/DL (ref 31.5–35.7)
MCV RBC AUTO: 102.5 FL (ref 79–97)
MONOCYTES # BLD AUTO: 0.57 10*3/MM3 (ref 0.1–0.9)
MONOCYTES NFR BLD AUTO: 7.9 % (ref 5–12)
NEUTROPHILS NFR BLD AUTO: 4.6 10*3/MM3 (ref 1.7–7)
NEUTROPHILS NFR BLD AUTO: 63.7 % (ref 42.7–76)
NRBC BLD AUTO-RTO: 0 /100 WBC (ref 0–0.2)
PLATELET # BLD AUTO: 356 10*3/MM3 (ref 140–450)
PMV BLD AUTO: 10.5 FL (ref 6–12)
POTASSIUM SERPL-SCNC: 4.2 MMOL/L (ref 3.5–5.2)
PROT SERPL-MCNC: 7.6 G/DL (ref 6–8.5)
RBC # BLD AUTO: 3.53 10*6/MM3 (ref 3.77–5.28)
SODIUM SERPL-SCNC: 142 MMOL/L (ref 136–145)
TIBC SERPL-MCNC: 264 MCG/DL (ref 298–536)
TRANSFERRIN SERPL-MCNC: 177 MG/DL (ref 200–360)
URATE SERPL-MCNC: 5.1 MG/DL (ref 2.4–5.7)
WBC NRBC COR # BLD AUTO: 7.23 10*3/MM3 (ref 3.4–10.8)

## 2024-02-09 PROCEDURE — 83615 LACTATE (LD) (LDH) ENZYME: CPT | Performed by: INTERNAL MEDICINE

## 2024-02-09 PROCEDURE — 80053 COMPREHEN METABOLIC PANEL: CPT | Performed by: INTERNAL MEDICINE

## 2024-02-09 PROCEDURE — 83540 ASSAY OF IRON: CPT | Performed by: INTERNAL MEDICINE

## 2024-02-09 PROCEDURE — 82728 ASSAY OF FERRITIN: CPT | Performed by: INTERNAL MEDICINE

## 2024-02-09 PROCEDURE — 36415 COLL VENOUS BLD VENIPUNCTURE: CPT

## 2024-02-09 PROCEDURE — 85025 COMPLETE CBC W/AUTO DIFF WBC: CPT | Performed by: INTERNAL MEDICINE

## 2024-02-09 PROCEDURE — 84466 ASSAY OF TRANSFERRIN: CPT | Performed by: INTERNAL MEDICINE

## 2024-02-09 PROCEDURE — 84550 ASSAY OF BLOOD/URIC ACID: CPT | Performed by: INTERNAL MEDICINE

## 2024-02-09 RX ORDER — HYDROXYUREA 500 MG/1
CAPSULE ORAL
Qty: 135 CAPSULE | Refills: 1 | Status: SHIPPED | OUTPATIENT
Start: 2024-02-09

## 2024-02-09 NOTE — PROGRESS NOTES
Drug: Hydrea (hydroxyurea)  Strength: 500 mg  Directions: Take 1 capsule by mouth daily for 2 days, then 2 caps daily every third day  Quantity: 108  Refills: 1  Pharmacy prescription sent to: [facility]    Completed independent double check on medication order/RX.  Oc aRmsey, PharmD, BCOP  Clinical Oncology Pharmacist

## 2024-02-09 NOTE — PROGRESS NOTES
Re: Refills of Oral Specialty Medication - Hydrea (hydroxyurea)    Drug-Drug Interactions: The current medication list was reviewed and there are no relevant drug-drug interactions with the specialty medication.  Medication Allergies: The patient has no relevant allergies as it relates to their oral specialty medication  Review of Labs/Dose Adjustments: NO DOSE CHANGE - I reviewed the most recent note and labs and the patient will continue without any dose changes.  I sent refills as described below.    Drug: Hydrea (hydroxyurea)  Strength: 500 mg  Directions: Take 1 capsule by mouth daily for 2 days, then 2 caps daily every third day  Quantity: 108  Refills: 1  Pharmacy prescription sent to: [facility]    Name/Credentials Irina Macias RPh, BCOP    2/9/2024  12:44 EST

## 2024-02-09 NOTE — PROGRESS NOTES
Subjective     REASON FOR FOLLOW UP:   1.  History of extensive portal vein thrombosis 20 years ago.    2.  Portal hypertension causing esophageal and rectal varices  3.  Severe GI bleeding from varices  4.  Blood loss anemia  5.  Iron deficiency anemia from chronic GI blood loss  6.  Interventions for severe GI bleeding at [facility] in February 2023 including endoscopic banding of esophageal and rectal varices 1/27/2023 and splenic artery embolization by interventional radiology 2/1/2023  7.  Pulmonary emboli on CT angiogram of the chest 2/10/2023  8.  Anticoagulation with Eliquis 5 mg p.o. twice daily  9.  Lack of response to oral iron therapy due to malabsorption  10.  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  IV iron therapy with 2 doses of Injectafer 750 mg each on 3/30/2023 and 4/6/2023  13.  JAK2 mutation positive.  We believe the patient does indeed have polycythemia vera and she was started on Hydrea therapy 500 mg daily in August 2023  10.  Hydrea dosage adjusted to 500 mg alternating with 1000 mg      HISTORY OF PRESENT ILLNESS:  The patient is a 68 y.o. year old female who was referred to us from her primary care physician due to a history of extensive portal vein and splenic vein thrombus about 20 years ago.  She has been on anticoagulation ever since with Coumadin.  She had been evaluated at the [facility] in the past and was negative for factor V Leiden mutation or prothrombin gene mutation.  Her IgG and IgM anticardiolipin antibodies were also negative.    She had recently undergone a Doppler study on lower extremities 12/19/2022 and was found to have a peroneal vein DVT in the left lower extremity.  Upon review of some of her recent coag studies she has had INRs that have been subtherapeutic at times but she has remained on Coumadin.  This clot occurred in the setting of recent surgery on the left foot for osteophyte.    After our initial  consult visit of 1/17/2023 the patient developed severe GI bleeding and has had multiple hospitalizations.  She presented to [facility] for admission 1/21/2023 with bright red blood per rectum and at that time she was anticoagulated with Coumadin.  She received fresh frozen plasma and vitamin K and was transfused with red blood cells.  She underwent an EGD performed by Dr. Livia Mccoy of gastroenterology with no obvious bleeding seen.    It was felt that she likely had bleeding from rectal varices and decision was made to transfer the patient to [facility] for further intervention.  She underwent endoscopic banding of both esophageal varices and rectal varices performed by Dr. Hernandez on 1/27/2023.  She then underwent interventional radiology splenic artery embolization on 2/1/2023.    She was discharged from the hospital but developed chest pain and was found to have pulmonary emboli on CT angiogram of the chest 2/10/2023 and was readmitted to [facility] from 2/10/2023 to 2/14/2023.  She was transition from Coumadin to Eliquis and seems to be tolerating the Eliquis well.    Was found to be iron deficient with an iron saturation of 6%.  She received IV iron therapy with 2 doses of IV Injectafer 750 mg each delivered 1 week apart.  She received the first dose on 3/30/2023 and the second dose on 4/6/2023 and tolerated it well.  Follow-up labs on 8/8/2023 showed excellent response. Her hemoglobin was 13 g/dL.  Iron saturation was 27% with a ferritin level of 201.   She continues to have thrombocytosis with a platelet count greater than 700,000 a because of this we did order a JAK2 mutation.    INTERVAL HISTORY:  We have started her on Hydrea therapy initially at 500 mg daily.  Her platelet count remained somewhat elevated and we have since adjusted the dose to 500 mg alternating with 1000 mg every other day.    She send today that since she made this dose adjustment she has no appetite and her  legs are swelling more.  Her blood count was in good shape today with a platelet count of 356,000 and I told her we could adjust her Hydrea a little more and have instructed her to take 500 mg 2 days in a row and 1000 mg every third day.    We will now be checking her labs every 8 weeks with MD follow-up in 6 months.      History of Present Illness     Past Medical History:   Diagnosis Date    Acute torn meniscus     Right    Ankle sprain 1967    many times as a kid    Arthritis     DEGENERATIVE HANDS AND FEET    Arthritis of back     Arthritis of neck 2012    femoral neck in osteopenic range    CTS (carpal tunnel syndrome) 2011    or degenerative arthritis    Difficulty urinating     Dry eyes     Eczema     Esophageal varices     Fracture, foot 2021    not a fracture but you provide no space for my feet problem of arthritus/bunions/constant pain/flat footed    Gastritis     Great toe pain     Hiatal hernia     History of dizziness     Hypertension     Knee swelling 2020    Legal blindness     LEFT EYE    Low back strain 1980    not as bad now    Mesenteric venous thrombosis     PORTAL AND SPLENIC    Pain     ABD WITH CERTAIN FOOD    Pelvic pressure in female     WITH CERTAIN MOVEMENTS AND WHEN SITTING    Portal hypertension     Portal vein thrombosis     Splenic vein thrombosis     Splenomegaly     Tear of meniscus of knee 2020    R knee osteoarthritis/torn Mesiscus 2 places    Thyroid disease     Ureter, stricture     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 AND BEING FOLLOWED BY MICHELLE DESIR MD        Past Surgical History:   Procedure Laterality Date    APPENDECTOMY N/A 1985    COLONOSCOPY N/A 2019    CYSTOSCOPY Bilateral 10/14/2020    Procedure: CYSTOSCOPY RETROGRADE PYELOGRAM;  Surgeon: Jaiden Desir MD;  Location: [facility];  Service: Urology;  Laterality: Bilateral;    CYSTOSCOPY W/ URETERAL STENT REMOVAL      ENDOSCOPY N/A 2019    ENDOSCOPY Left 1/23/2023    Procedure: ESOPHAGOGASTRODUODENOSCOPY AT BEDSIDE;   Surgeon: Livia Mcmanus MD;  Location: [facility] ENDOSCOPY;  Service: Gastroenterology;  Laterality: Left;  PRE- HEMATOCHEZIA, ESOPHAGEAL VARICES, GASTRIC VARIX    EXPLORATORY LAPAROTOMY N/A 2001    EXPLORATORY LAPAROTOMY      ORIGINALLY LOOKING FOR OVARIAN CANCER, WAS NOT FOUND.  FOUND MESENTERIC/PORTAL/SPLENIC VENOUS THROMBOSIS    FOOT FUSION Left 10/25/2022    Procedure: Left first metatarsal phalangeal joint fusion;  Surgeon: Ulises Fairchild MD;  Location: [facility] OR OSC;  Service: Orthopedics;  Laterality: Left;    HERNIA REPAIR N/A     INGUINAL HERNIA REPAIR Left 01/20/2021    Procedure: open ventral hernia repair with mesh;  Surgeon: Yoanna Jacobo MD;  Location: [facility] MAIN OR;  Service: General;  Laterality: Left;    LAPAROSCOPIC CHOLECYSTECTOMY N/A 2001    TONSILLECTOMY  1971    URETERAL STENT INSERTION          Current Outpatient Medications on File Prior to Visit   Medication Sig Dispense Refill    ascorbic acid (VITAMIN C) 500 MG tablet Take 2 tablets by mouth Daily. PT HOLDING FOR SURGERY      calcium carbonate (TUMS) 500 MG chewable tablet Chew 1 tablet Daily.      Cholecalciferol (Vitamin D3) 50 MCG (2000 UT) capsule Take 1 capsule by mouth Daily.      Eliquis 5 MG tablet tablet Take 1 tablet by mouth Every 12 (Twelve) Hours.      Glucosamine-Chondroitin-MSM 6361-2604-788 MG pack Take  by mouth.      hydroxyurea (HYDREA) 500 MG capsule Take 1 tablet alternating with 2 tablets daily 135 capsule 1    levothyroxine (SYNTHROID, LEVOTHROID) 50 MCG tablet Take 1 tablet by mouth Daily.      Magnesium Oxide 400 (240 Mg) MG tablet Take 1 tablet by mouth Daily.      melatonin 3 MG tablet Take 1 tablet by mouth At Night As Needed for Sleep. 30 tablet     multivitamin (THERAGRAN) tablet tablet Take 1 tablet by mouth Daily. PT HOLDING FOR SURGERY      nadolol (CORGARD) 20 MG tablet Take 1.5 tablets by mouth Every Morning.      Probiotic Product (PROBIOTIC DAILY PO) Take 1 capsule by mouth Every Night.       Zinc 100 MG tablet Take 200 tablet/day by mouth.      hydrOXYzine (ATARAX) 25 MG tablet Take 1-2 tablets by mouth As Needed.      triamcinolone (KENALOG) 0.1 % cream Apply 1 application  topically to the appropriate area as directed As Needed.       No current facility-administered medications on file prior to visit.        ALLERGIES:    Allergies   Allergen Reactions    Contrast Dye (Echo Or Unknown Ct/Mr) Swelling     FACIAL    Diclofenac Other (See Comments)     Bad heartburn causing pressure in the chest    Aspartame Itching    Morphine Headache    Heparin Unknown - Low Severity     HIT 2/11 per Dr. Neetu Dai    Lamisil [Terbinafine] Swelling     JOINT PAIN, REDNESS    Dexamethasone GI Intolerance    Prednisone Rash and GI Intolerance     CAN TAKE IF NEEDED        Social History     Socioeconomic History    Marital status:     Number of children: 3   Tobacco Use    Smoking status: Never    Smokeless tobacco: Never   Vaping Use    Vaping Use: Never used   Substance and Sexual Activity    Alcohol use: Yes     Comment: glass of wine a month with dinner    Drug use: Never    Sexual activity: Never        Family History   Problem Relation Age of Onset    Osteoporosis Mother     Heart disease Father     Tuberculosis Father     Breast cancer Sister     Cancer Sister         lymphnodes    Cancer Sister         pallete    Colon cancer Paternal Grandfather     Malig Hyperthermia Neg Hx         Review of Systems   Constitutional:  Positive for fatigue and unexpected weight change. Negative for activity change, appetite change, chills and fever.   HENT:  Negative for hearing loss, mouth sores, nosebleeds, trouble swallowing and voice change.    Eyes:  Negative for pain and visual disturbance.   Respiratory:  Positive for cough. Negative for shortness of breath and wheezing.    Cardiovascular:  Negative for chest pain and palpitations.   Gastrointestinal:  Positive for abdominal distention and diarrhea. Negative  "for abdominal pain, constipation, nausea and vomiting.   Genitourinary:  Negative for difficulty urinating, frequency, hematuria and urgency.   Musculoskeletal:  Negative for arthralgias, back pain, joint swelling and neck pain.   Skin:  Negative for rash.   Neurological:  Positive for weakness. Negative for dizziness, seizures, syncope and headaches.   Hematological:  Negative for adenopathy. Bruises/bleeds easily.   Psychiatric/Behavioral:  Negative for behavioral problems and confusion. The patient is not nervous/anxious.         Objective     Vitals:    02/09/24 1041   BP: 103/69   Pulse: 59   Resp: 16   Temp: 98 °F (36.7 °C)   TempSrc: Temporal   SpO2: 97%   Weight: 73.5 kg (162 lb)   Height: 172 cm (67.72\")   PainSc: 0-No pain           2/9/2024    10:42 AM   Current Status   ECOG score 0       Physical Exam  Constitutional:       General: She is not in acute distress.     Appearance: She is well-developed. She is ill-appearing.   HENT:      Head: Normocephalic.   Eyes:      General: No scleral icterus.     Conjunctiva/sclera: Conjunctivae normal.      Pupils: Pupils are equal, round, and reactive to light.   Neck:      Thyroid: No thyromegaly.      Vascular: No JVD.   Cardiovascular:      Rate and Rhythm: Normal rate and regular rhythm.      Heart sounds: No murmur heard.     No friction rub. No gallop.   Pulmonary:      Effort: Pulmonary effort is normal.      Breath sounds: Normal breath sounds. No wheezing or rales.   Abdominal:      General: There is distension.      Tenderness: There is abdominal tenderness.      Comments: Abdomen is firm and distended   Musculoskeletal:         General: No deformity. Normal range of motion.      Cervical back: Normal range of motion and neck supple.   Lymphadenopathy:      Cervical: No cervical adenopathy.   Skin:     General: Skin is warm and dry.      Findings: No erythema or rash.   Neurological:      Mental Status: She is alert and oriented to person, place, and " time.      Cranial Nerves: No cranial nerve deficit.      Deep Tendon Reflexes: Reflexes are normal and symmetric.   Psychiatric:         Behavior: Behavior normal.         Judgment: Judgment normal.           RECENT LABS:  Hematology WBC   Date Value Ref Range Status   02/09/2024 7.23 3.40 - 10.80 10*3/mm3 Final     RBC   Date Value Ref Range Status   02/09/2024 3.53 (L) 3.77 - 5.28 10*6/mm3 Final     Hemoglobin   Date Value Ref Range Status   02/09/2024 12.0 12.0 - 15.9 g/dL Final     Hematocrit   Date Value Ref Range Status   02/09/2024 36.2 34.0 - 46.6 % Final     Platelets   Date Value Ref Range Status   02/09/2024 356 140 - 450 10*3/mm3 Final        RA 2 mutation ([id number]) POSITIVE    Lab Results   Component Value Date    GLUCOSE 92 11/17/2023    CALCIUM 9.3 11/17/2023     11/17/2023    K 4.6 11/17/2023    CO2 28.4 11/17/2023     11/17/2023    BUN 17 11/17/2023    CREATININE 0.71 11/17/2023    EGFRIFNONA 79 01/13/2021    BCR 23.9 11/17/2023    ANIONGAP 8.6 11/17/2023     Lab Results   Component Value Date    IRON 72 11/10/2023    TIBC 268 (L) 11/10/2023    FERRITIN 307.50 (H) 11/10/2023   SAT 27%    CT Chest Pulmonary Angiogram   2/10/2023 5:12 PM   IMPRESSION:   1.  Segmental pulmonary emboli are identified within the right upper and lower pulmonary arteries. No evidence of right heart strain.     2.  Small/moderate volume left-sided pleural fluid collection. There is bibasilar atelectasis, left side worse than right.     3.  Small low-attenuation left thyroid lobe nodule. If not previously assessed, nonemergent/outpatient thyroid sonography should be considered for complete evaluation.     4.  Please refer to same-day CT abdomen pelvis for characterization of abdominopelvic findings.     CT Abdomen Pelvis W   2/10/2023 5:12 PM   IMPRESSION:   1.  Interval changes from splenic artery embolization with embolization material along the lesser curvature of the stomach, near the splenic hilum, and into  the splenic parenchyma. The spleen is enlarged with new geographic areas of hypodensity consistent with large splenic infarct.     2.  Moderate left-sided hydroureteronephrosis is unchanged.     3.  Portal vein, splenic vein, and superior mesenteric vein thrombosis is redemonstrated. Diffuse mesenteric edema is unchanged.     4.  Increased small volume ascites.     5.  Small left pleural effusion and adjacent opacity consistent with atelectasis.        VENOUS DOPPLER 12/19/2022  Interpretation Summary         Acute deep vein thrombosis in 1 of 2 paired left peroneal veins.    Chronic deep vein thrombosis in the left gastrocnemius vein.    Previous ablation of the left great saphenous vein in the thigh and proximal calf.    All other left sided veins appeared normal.      Assessment & Plan     1.  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  She was worked up at the [facility] at that time with no obvious thrombophilic defect.  She has been anticoagulated with Coumadin for about 20 years but has now been switched to Eliquis.  2.  Positive JAK2 mutation identified in August 2023.  We believe the patient does have a myeloproliferative disorder either ET or polycythemia vera.  Patient is currently on Hydrea 500 mg daily.  2.  Portal hypertension leading to esophageal and rectal varices.  3.  Extensive GI bleeding from varices requiring intervention at [facility] including banding of esophageal varices and rectal varices.  4.  Splenic artery embolization by interventional radiology at [facility] 2/1/2023  5.  Anemia due to acute blood loss and iron deficiency.  6.  Failure to respond to oral iron therapy due to malabsorption.  She has had a good response to 2 weekly doses of IV Injectafer 750 mg each on 3/30/2023 and 4/6/2023   7.  Bilateral pulmonary emboli.  Patient will require long-term Eliquis anticoagulation.    PLAN  1.  Continue  Hydrea but dose will be adjusted to 500 mg daily for 2 days with 1000 mg every third day..    2.  We will have her return for labs every 8 weeks to check her CBC and further adjust her Hydrea dosage as needed.  3.  MD follow-up in 6 months with additional labs including repeat iron panel and ferritin and serum chemistries.  4.  She will remain on long-term Eliquis.

## 2024-02-27 ENCOUNTER — OFFICE VISIT (OUTPATIENT)
Dept: ORTHOPEDIC SURGERY | Facility: CLINIC | Age: 69
End: 2024-02-27
Payer: MEDICARE

## 2024-02-27 VITALS — TEMPERATURE: 98.6 F | WEIGHT: 162 LBS | HEIGHT: 68 IN | BODY MASS INDEX: 24.55 KG/M2

## 2024-02-27 DIAGNOSIS — M17.11 PRIMARY OSTEOARTHRITIS OF RIGHT KNEE: ICD-10-CM

## 2024-02-27 DIAGNOSIS — R52 PAIN: Primary | ICD-10-CM

## 2024-02-27 RX ORDER — LIDOCAINE HYDROCHLORIDE 10 MG/ML
3 INJECTION, SOLUTION EPIDURAL; INFILTRATION; INTRACAUDAL; PERINEURAL
Status: COMPLETED | OUTPATIENT
Start: 2024-02-27 | End: 2024-02-27

## 2024-02-27 RX ADMIN — LIDOCAINE HYDROCHLORIDE 3 ML: 10 INJECTION, SOLUTION EPIDURAL; INFILTRATION; INTRACAUDAL; PERINEURAL at 10:23

## 2024-02-27 NOTE — PROGRESS NOTES
Patient: Blanka Mueller  YOB: 1955 68 y.o. female  Medical Record Number: [id number]    Chief Complaints:   Chief Complaint   Patient presents with    Right Knee - Follow-up       History of Present Illness:Blanka Mueller is a 68 y.o. female who presents for follow-up of right knee pain that is chronic in nature.  Patient has known right knee osteoarthritis that we have been conservatively treating.  Patient was last seen in January 2023.  She states in the interim she has had multiple health complications due to a blood clotting disorder.  She reports as of recent that her symptoms have progressively worsened.  She states the pain is located mostly to the medial aspect of her knee and describes it as a moderate to severe ache.  She reports her symptoms are worse with prolonged walking standing, going up and down stairs.  Patient gets some relief by taking brief periods of rest.  She denies any instability issues and she is without any other significant complaints today.    Allergies:   Allergies   Allergen Reactions    Contrast Dye (Echo Or Unknown Ct/Mr) Swelling     FACIAL    Diclofenac Other (See Comments)     Bad heartburn causing pressure in the chest    Aspartame Itching    Morphine Headache    Heparin Unknown - Low Severity     HIT 2/11 per Dr. Neetu Dai    Lamisil [Terbinafine] Swelling     JOINT PAIN, REDNESS    Dexamethasone GI Intolerance    Prednisone Rash and GI Intolerance     CAN TAKE IF NEEDED       Medications:   Current Outpatient Medications   Medication Sig Dispense Refill    ascorbic acid (VITAMIN C) 500 MG tablet Take 2 tablets by mouth Daily. PT HOLDING FOR SURGERY      Cholecalciferol (Vitamin D3) 50 MCG (2000 UT) capsule Take 1 capsule by mouth Daily.      Eliquis 5 MG tablet tablet Take 1 tablet by mouth Every 12 (Twelve) Hours.      hydroxyurea (HYDREA) 500 MG capsule Take 1 capsule ( 500 mg) by mouth  daily for 2 days, then take 2 caps ( 1000 mg) every third  "day. 135 capsule 1    levothyroxine (SYNTHROID, LEVOTHROID) 50 MCG tablet Take 1 tablet by mouth Daily.      Magnesium Oxide 400 (240 Mg) MG tablet Take 1 tablet by mouth Daily.      multivitamin (THERAGRAN) tablet tablet Take 1 tablet by mouth Daily. PT HOLDING FOR SURGERY      nadolol (CORGARD) 20 MG tablet Take 1.5 tablets by mouth Every Morning.      Probiotic Product (PROBIOTIC DAILY PO) Take 1 capsule by mouth Every Night.      Zinc 100 MG tablet Take 200 tablet/day by mouth.      calcium carbonate (TUMS) 500 MG chewable tablet Chew 1 tablet Daily. (Patient not taking: Reported on 2/27/2024)      Glucosamine-Chondroitin-MSM 4126-4909-278 MG pack Take  by mouth.      hydrOXYzine (ATARAX) 25 MG tablet Take 1-2 tablets by mouth As Needed.      melatonin 3 MG tablet Take 1 tablet by mouth At Night As Needed for Sleep. 30 tablet     triamcinolone (KENALOG) 0.1 % cream Apply 1 application  topically to the appropriate area as directed As Needed.       No current facility-administered medications for this visit.         The following portions of the patient's history were reviewed and updated as appropriate: allergies, current medications, past family history, past medical history, past social history, past surgical history and problem list.    Review of Systems:   Pertinent positives/negative listed in HPI above    Physical Exam:   Vitals:    02/27/24 0938   Temp: 98.6 °F (37 °C)   TempSrc: Temporal   Weight: 73.5 kg (162 lb)   Height: 172 cm (67.72\")   PainSc:   7   PainLoc: Knee       General: A and O x 3, ASA, NAD      Knee Exam List: Knee:  right    ALIGNMENT:     Varus  ,   Patella  tracks  midline    GAIT:    Antalgic    SKIN:    No abnormality    RANGE OF MOTION:   3  -  120   DEG    STRENGTH:   4  / 5    LIGAMENTS:    No varus / valgus instability.   Negative  Lachman.    MENISCUS:     Negative   Jessie       DISTAL PULSES:    Paplable    DISTAL SENSATION :   Intact    LYMPHATICS:     No   " lymphadenopathy    OTHER:          - Positive   effusion      - Crepitance with ROM     Radiology:  Xrays 3views right knee (ap,lateral, sunrise) were ordered and reviewed for evaluation of knee pain demonstrating advanced varus osteoarthritis with bone on bone articulation, subchondral cysts, and periarticular osteophytes as well as moderate patellofemoral osteoarthritis with near bone-on-bone articulation. Today's xrays were compared to previous xrays and demonstrate no change.    Assessment/Plan: Primary osteoarthritis right knee    Treatment options as well as imaging results were discussed in detail with the patient.  Due to patient's medical concerns she is not a good candidate for surgery therefore we will continue to treat her conservatively.  I have instructed patient to continue with her quad strengthening and range of motion exercises.  I did instruct her on the RICE method for inflammation and swelling.  Patient reports previously that cortisone injections did not last very long therefore we will get her approved for gel injection today.  Will have her follow back up with me on an as-needed basis.    Large Joint Arthrocentesis: R knee  Date/Time: 2/27/2024 10:23 AM  Consent given by: patient  Site marked: site marked  Timeout: Immediately prior to procedure a time out was called to verify the correct patient, procedure, equipment, support staff and site/side marked as required   Supporting Documentation  Indications: pain and joint swelling   Procedure Details  Location: knee - R knee  Preparation: Patient was prepped and draped in the usual sterile fashion  Needle size: 22 G  Approach: anterolateral  Medications administered: 3 mL lidocaine PF 1% 1 %; 48 mg hylan 48 MG/6ML  Patient tolerance: patient tolerated the procedure well with no immediate complications    (3 mL of lidocaine was used for anesthetic purposes)      EVARISTO Mann  2/27/2024

## 2024-04-05 ENCOUNTER — CLINICAL SUPPORT (OUTPATIENT)
Dept: ONCOLOGY | Facility: HOSPITAL | Age: 69
End: 2024-04-05
Payer: MEDICARE

## 2024-04-05 ENCOUNTER — LAB (OUTPATIENT)
Dept: OTHER | Facility: HOSPITAL | Age: 69
End: 2024-04-05
Payer: MEDICARE

## 2024-04-05 DIAGNOSIS — D47.1 MYELOPROLIFERATIVE DISORDER: ICD-10-CM

## 2024-04-05 DIAGNOSIS — D50.0 IRON DEFICIENCY ANEMIA DUE TO CHRONIC BLOOD LOSS: ICD-10-CM

## 2024-04-05 DIAGNOSIS — D75.839 THROMBOCYTOSIS: ICD-10-CM

## 2024-04-05 DIAGNOSIS — Z15.89 JAK2 V617F MUTATION: ICD-10-CM

## 2024-04-05 LAB
BASOPHILS # BLD AUTO: 0.09 10*3/MM3 (ref 0–0.2)
BASOPHILS NFR BLD AUTO: 0.9 % (ref 0–1.5)
DEPRECATED RDW RBC AUTO: 51.7 FL (ref 37–54)
EOSINOPHIL # BLD AUTO: 0.46 10*3/MM3 (ref 0–0.4)
EOSINOPHIL NFR BLD AUTO: 4.8 % (ref 0.3–6.2)
ERYTHROCYTE [DISTWIDTH] IN BLOOD BY AUTOMATED COUNT: 14.1 % (ref 12.3–15.4)
HCT VFR BLD AUTO: 34.8 % (ref 34–46.6)
HGB BLD-MCNC: 12 G/DL (ref 12–15.9)
IMM GRANULOCYTES # BLD AUTO: 0.08 10*3/MM3 (ref 0–0.05)
IMM GRANULOCYTES NFR BLD AUTO: 0.8 % (ref 0–0.5)
LYMPHOCYTES # BLD AUTO: 1.61 10*3/MM3 (ref 0.7–3.1)
LYMPHOCYTES NFR BLD AUTO: 16.9 % (ref 19.6–45.3)
MCH RBC QN AUTO: 34.7 PG (ref 26.6–33)
MCHC RBC AUTO-ENTMCNC: 34.5 G/DL (ref 31.5–35.7)
MCV RBC AUTO: 100.6 FL (ref 79–97)
MONOCYTES # BLD AUTO: 0.8 10*3/MM3 (ref 0.1–0.9)
MONOCYTES NFR BLD AUTO: 8.4 % (ref 5–12)
NEUTROPHILS NFR BLD AUTO: 6.49 10*3/MM3 (ref 1.7–7)
NEUTROPHILS NFR BLD AUTO: 68.2 % (ref 42.7–76)
NRBC BLD AUTO-RTO: 0 /100 WBC (ref 0–0.2)
PLATELET # BLD AUTO: 413 10*3/MM3 (ref 140–450)
PMV BLD AUTO: 10.5 FL (ref 6–12)
RBC # BLD AUTO: 3.46 10*6/MM3 (ref 3.77–5.28)
WBC NRBC COR # BLD AUTO: 9.53 10*3/MM3 (ref 3.4–10.8)

## 2024-04-05 PROCEDURE — 85025 COMPLETE CBC W/AUTO DIFF WBC: CPT | Performed by: INTERNAL MEDICINE

## 2024-04-05 NOTE — PROGRESS NOTES
Blanka Mueller is a 68 y.o. female with Thrombocytosis, Thrombophilia, Positive JAK2 mutation seen by Hematology/Oncology provider, Dr. Singh, and is scheduled with RN Review/Clinical Pharmacy Review offered by [facility]. Patient's visit was held in clinic today.     Therapy plan  Hydroxyurea    Current Medication List  Medication Sig Start Date End Date Taking? Authorizing Provider   ascorbic acid (VITAMIN C) 500 MG tablet Take 2 tablets by mouth Daily. PT HOLDING FOR SURGERY    Kala Be MD   calcium carbonate (TUMS) 500 MG chewable tablet Chew 1 tablet Daily.  Patient not taking: Reported on 2/27/2024    Kala Be MD   Cholecalciferol (Vitamin D3) 50 MCG (2000 UT) capsule Take 1 capsule by mouth Daily.    Kala Be MD   Eliquis 5 MG tablet tablet Take 1 tablet by mouth Every 12 (Twelve) Hours. 3/16/23   Kala Be MD   Glucosamine-Chondroitin-MSM 3189-5202-577 MG pack Take  by mouth.    Kala Be MD   hydroxyurea (HYDREA) 500 MG capsule Take 1 capsule ( 500 mg) by mouth  daily for 2 days, then take 2 caps ( 1000 mg) every third day. 2/9/24   Marc Singh MD   hydrOXYzine (ATARAX) 25 MG tablet Take 1-2 tablets by mouth As Needed. 3/28/23   Kala Be MD   levothyroxine (SYNTHROID, LEVOTHROID) 50 MCG tablet Take 1 tablet by mouth Daily. 12/8/22   Kala Be MD   Magnesium Oxide 400 (240 Mg) MG tablet Take 1 tablet by mouth Daily.    Kala Be MD   melatonin 3 MG tablet Take 1 tablet by mouth At Night As Needed for Sleep. 1/23/23   Nabil Osuna MD   multivitamin (THERAGRAN) tablet tablet Take 1 tablet by mouth Daily. PT HOLDING FOR SURGERY    Kala Be MD   nadolol (CORGARD) 20 MG tablet Take 1.5 tablets by mouth Every Morning.    Kala Be MD   Probiotic Product (PROBIOTIC DAILY PO) Take 1 capsule by mouth Every Night.    Kala Be MD    triamcinolone (KENALOG) 0.1 % cream Apply 1 application  topically to the appropriate area as directed As Needed. 9/1/23 8/31/24  ProviderKala MD   Zinc 100 MG tablet Take 200 tablet/day by mouth.    Provider, MD Kala       Relevant Laboratory Values  Lab Results   Component Value Date    WBC 9.53 04/05/2024    RBC 3.46 (L) 04/05/2024    HGB 12.0 04/05/2024    HCT 34.8 04/05/2024    .6 (H) 04/05/2024    MCH 34.7 (H) 04/05/2024    MCHC 34.5 04/05/2024    RDW 14.1 04/05/2024    RDWSD 51.7 04/05/2024    MPV 10.5 04/05/2024     04/05/2024    NEUTRORELPCT 68.2 04/05/2024    LYMPHORELPCT 16.9 (L) 04/05/2024    MONORELPCT 8.4 04/05/2024    EOSRELPCT 4.8 04/05/2024    BASORELPCT 0.9 04/05/2024    AUTOIGPER 0.8 (H) 04/05/2024    NEUTROABS 6.49 04/05/2024    LYMPHSABS 1.61 04/05/2024    MONOSABS 0.80 04/05/2024    EOSABS 0.46 (H) 04/05/2024    BASOSABS 0.09 04/05/2024    AUTOIGNUM 0.08 (H) 04/05/2024    NRBC 0.0 04/05/2024       Clinical Review  Patient reports feeling well overall. She states she felt very fatigued and achy after Reclast infusion at [facility]. Patient reports feeling better since.     Patient reports adherence to Hydrea and alternates between 500 mg one day and 1000 mg for two days. Patient also reports adherence to Eliquis. Patient denies any signs/symptoms of bruising or bleeding.     Platelets 413 from 356 (2/9/24) from 404 (1/12/24)     The patient's current therapy plan and above labs have been reviewed.     Plan  CBC reviewed, results are stable for this patient at this time. - see above in Laboratory Results  Will instruct Blanka Mueller to continue Hydrea at current dosage (alternates between 500 mg one day and 1000 mg for two days).  Follow up in 8 weeks. Next scheduled appointment(s) reviewed with patient.  Patient is instructed to call the office with any concerns or new symptoms prior to next visit.  Verbal and written information provided. Patient expresses  understanding and has no further questions at this time.            Sinai Scott, PharmD  Clinical Pharmacy Services   [facility]  4/5/2024  13:26 EDT

## 2024-05-15 ENCOUNTER — OFFICE VISIT (OUTPATIENT)
Age: 69
End: 2024-05-15
Payer: MEDICARE

## 2024-05-15 VITALS — TEMPERATURE: 96.8 F | BODY MASS INDEX: 24.63 KG/M2 | HEIGHT: 68 IN | WEIGHT: 162.5 LBS

## 2024-05-15 DIAGNOSIS — M19.032 OSTEOARTHRITIS OF LEFT WRIST, UNSPECIFIED OSTEOARTHRITIS TYPE: ICD-10-CM

## 2024-05-15 DIAGNOSIS — M19.042 OSTEOARTHRITIS OF BOTH HANDS, UNSPECIFIED OSTEOARTHRITIS TYPE: ICD-10-CM

## 2024-05-15 DIAGNOSIS — M19.041 OSTEOARTHRITIS OF BOTH HANDS, UNSPECIFIED OSTEOARTHRITIS TYPE: ICD-10-CM

## 2024-05-15 DIAGNOSIS — M18.9 OSTEOARTHRITIS OF FIRST CARPOMETACARPAL (CMC) JOINT OF ONE HAND: ICD-10-CM

## 2024-05-15 DIAGNOSIS — M19.031 OSTEOARTHRITIS OF RIGHT WRIST, UNSPECIFIED OSTEOARTHRITIS TYPE: ICD-10-CM

## 2024-05-15 PROCEDURE — 1159F MED LIST DOCD IN RCRD: CPT | Performed by: STUDENT IN AN ORGANIZED HEALTH CARE EDUCATION/TRAINING PROGRAM

## 2024-05-15 PROCEDURE — 99204 OFFICE O/P NEW MOD 45 MIN: CPT | Performed by: STUDENT IN AN ORGANIZED HEALTH CARE EDUCATION/TRAINING PROGRAM

## 2024-05-15 PROCEDURE — 1160F RVW MEDS BY RX/DR IN RCRD: CPT | Performed by: STUDENT IN AN ORGANIZED HEALTH CARE EDUCATION/TRAINING PROGRAM

## 2024-05-15 RX ORDER — ACETAMINOPHEN 500 MG
500 TABLET ORAL EVERY 6 HOURS PRN
COMMUNITY

## 2024-05-15 NOTE — PROGRESS NOTES
"Chief Complaint   Patient presents with    Right Wrist - Initial Evaluation    Left Wrist - Initial Evaluation       History of Present Illness  Blanka is a 68 y.o. year old RHD female here today for bilateral wrist pain that has been present for years but with progressive worsening with no known injury or trauma. Pain is currently rated 8/10, right is worse than left, and is described as an aching with shooting pain.   Admits to associated stiffness and swelling in her fingers, worse at the MCP joints.  Denies any numbness or tingling.   Pain worsens with increased activity.   Has been managing symptoms with a collagen supplement. She will take Tylenol as needed, but states that she feels groggy afterwards so doesn't take it often.  Denies any previous injury or occurrence.     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   Stays active and does a lot of building, gardening.   Has a history of knee OA being managed by Dr. Casarez/Arturo Olivo    The following data was reviewed by: Debora Bhatia DO on 05/15/2024:  Data reviewed :        Lab Results   Component Value Date    GLUCOSE 106 (H) 02/09/2024    BUN 17 02/09/2024    CREATININE 0.78 02/09/2024    EGFR 83.9 02/09/2024    BCR 21.8 02/09/2024    K 4.2 02/09/2024    CO2 27.2 02/09/2024    CALCIUM 9.1 02/09/2024    ALBUMIN 3.9 02/09/2024    BILITOT 0.6 02/09/2024    AST 30 02/09/2024    ALT 20 02/09/2024       Review of Systems   All other systems reviewed and are negative.      Temp 96.8 °F (36 °C)   Ht 172.7 cm (68\")   Wt 73.7 kg (162 lb 8 oz)   BMI 24.71 kg/m²        Physical Exam  Vital signs reviewed.   General: Well developed, well nourished; No acute distress.  Eyes: conjunctiva clear; pupils equally round and reactive  ENT: external ears and nose atraumatic; hearing normal  CV: no peripheral edema, 2+ distal pulses  Resp: normal respiratory effort, no use of accessory muscles  Skin: normal color and pigmentation; no rashes or wounds; normal " turgor  Psych: alert and oriented; mood and affect appropriate; recent and remote memory intact  Neuro: sensation to light touch intact    MSK Exam:  The bilateral hands and wrists are without obvious signs of acute bony deformity. There are diffuse chronic bony changes throughout the hands, including mild ulnar deviation at the first digit which she states is new. There appears to be some soft tissue swelling at the MCP joints, worse at the second and third MCPs bilaterally. There is tenderness to the dorsal or volar joint line on the right. There is significant tenderness to the bilateral first CMC joints. No apparent tenderness at the first dorsal compartment and negative Finkelstein's test.  Active and passive range of motion at the wrist is limited and with mild pain, worse on the right. Strength testing of the wrist and forearm are 4/5 on the left and 4-/5 on the right with pain. Sensory and vascular exams are otherwise normal.     Bilateral Wrist X-Ray  Indication: Pain  Views: AP, Lateral, and Oblique  Findings:  No fracture  Normal soft tissues  Diffuse degenerative changes throughout the hands, most notably at the first CMC and MCP joints bilaterally.  On the right wrist, there are advanced degenerative changes at the distal radial ulnar joint, including small chronic appearing calcification adjacent to the ulnar styloid.    Narrowing at the radiocarpal joint.  No prior studies were available for comparison.      Assessment and Plan  Diagnoses and all orders for this visit:    1. Osteoarthritis of left wrist, unspecified osteoarthritis type  -     Rheumatoid Arthritis (RA) Profile; Future  -     Sedimentation Rate  -     C-reactive Protein    2. Osteoarthritis of right wrist, unspecified osteoarthritis type  -     Rheumatoid Arthritis (RA) Profile; Future  -     Sedimentation Rate  -     C-reactive Protein    3. Osteoarthritis of first carpometacarpal (CMC) joint of one hand  -     Rheumatoid Arthritis  (RA) Profile; Future  -     Sedimentation Rate  -     C-reactive Protein    4. Osteoarthritis of both hands, unspecified osteoarthritis type  -     Rheumatoid Arthritis (RA) Profile; Future  -     Sedimentation Rate  -     C-reactive Protein    Blanka is a 68 y.o. year old RHD female here today for bilateral wrist pain that has been present for years but with progressive worsening with no known injury or trauma. We reviewed images and exam findings. Initial x-rays show diffuse degenerative changes throughout the hands, most notably at the first CMC and MCP joints bilaterally.  Additionally, at the right wrist there are advanced degenerative changes at the distal radial ulnar joint, including small chronic appearing calcification adjacent to the ulnar styloid and narrowing at the radiocarpal joint.  I feel her symptoms are related to degenerative changes, however given the advanced nature of the changes at the DRUJ with no history of significant trauma or injury, I feel it is worth ruling out possible inflammatory causes. Order for blood work provided. She was placed in a thumb spica brace on the right to hopefully help with wrist and CMC pain.  She may wear this with activity and as needed for pain and comfort.  She does not seem to be overly painful at the left wrist, with most of her symptoms appearing to come from the first CMC joint.  Discussed possibility of a Thumb-o-prene brace on the left, however we currently do not have any in the office. She should avoid oral anti-inflammatories due to her medical history (and is on Eliquis). I recommended trial of topical Voltaren 3-4 times daily as needed.  Provided her with a handout for gentle hand exercises to work on gentle range of motion and strengthening. She may continue with activity as tolerated but should avoid painful or overly strenuous activity.  We will follow-up in 6 weeks. All of her questions were answered and she is agreeable with the  plan.    Dictated utilizing Dragon dictation.     no difficulty in swallowing

## 2024-05-17 ENCOUNTER — OFFICE VISIT (OUTPATIENT)
Dept: ORTHOPEDIC SURGERY | Facility: CLINIC | Age: 69
End: 2024-05-17
Payer: MEDICARE

## 2024-05-17 VITALS — WEIGHT: 159.8 LBS | HEIGHT: 68 IN | BODY MASS INDEX: 24.22 KG/M2 | TEMPERATURE: 96 F

## 2024-05-17 DIAGNOSIS — M17.11 PRIMARY OSTEOARTHRITIS OF RIGHT KNEE: Primary | ICD-10-CM

## 2024-05-17 RX ORDER — LIDOCAINE HYDROCHLORIDE 10 MG/ML
5 INJECTION, SOLUTION EPIDURAL; INFILTRATION; INTRACAUDAL; PERINEURAL
Status: COMPLETED | OUTPATIENT
Start: 2024-05-17 | End: 2024-05-17

## 2024-05-17 RX ORDER — TRIAMCINOLONE ACETONIDE 40 MG/ML
80 INJECTION, SUSPENSION INTRA-ARTICULAR; INTRAMUSCULAR
Status: COMPLETED | OUTPATIENT
Start: 2024-05-17 | End: 2024-05-17

## 2024-05-17 RX ADMIN — LIDOCAINE HYDROCHLORIDE 5 ML: 10 INJECTION, SOLUTION EPIDURAL; INFILTRATION; INTRACAUDAL; PERINEURAL at 17:43

## 2024-05-17 RX ADMIN — TRIAMCINOLONE ACETONIDE 80 MG: 40 INJECTION, SUSPENSION INTRA-ARTICULAR; INTRAMUSCULAR at 17:43

## 2024-05-17 NOTE — PROGRESS NOTES
5/17/2024    Blanka Mueller is here today for worsening knee pain. Pt has undergone injection of the knee in the past with good resolution of symptoms. Pt is requesting a repeat injection.     KNEE Injection Procedure Note:    Large Joint Arthrocentesis: R knee  Date/Time: 5/17/2024 5:43 PM  Consent given by: patient  Site marked: site marked  Timeout: Immediately prior to procedure a time out was called to verify the correct patient, procedure, equipment, support staff and site/side marked as required   Supporting Documentation  Indications: pain and joint swelling   Procedure Details  Location: knee - R knee  Preparation: Patient was prepped and draped in the usual sterile fashion  Needle size: 22 G  Approach: anterolateral  Medications administered: 5 mL lidocaine PF 1% 1 %; 80 mg triamcinolone acetonide 40 MG/ML  Patient tolerance: patient tolerated the procedure well with no immediate complications    (3 mL of lidocaine was used for anesthetic purposes)      At the conclusion of the injection I discussed the importance of continued quad strengthening exercises on a daily basis. I will see the patient back if the symptoms should fail to improve or worsen.    Arturo Olivo, APRN  5/17/2024

## 2024-05-21 ENCOUNTER — SPECIALTY PHARMACY (OUTPATIENT)
Dept: PHARMACY | Facility: HOSPITAL | Age: 69
End: 2024-05-21
Payer: MEDICARE

## 2024-05-30 ENCOUNTER — SPECIALTY PHARMACY (OUTPATIENT)
Dept: PHARMACY | Facility: HOSPITAL | Age: 69
End: 2024-05-30
Payer: MEDICARE

## 2024-05-30 NOTE — PROGRESS NOTES
Specialty Pharmacy Patient Management Program  Oncology 6-Month Clinical Assessment       Blanka Mueller is a 68 y.o. female with polycythemia vera called today to assess adherence and side effects.    Regimen: hydrea 500 mg po daily for 2 days then 1000 mg po daily every third day.     Reason for Outreach: Routine medication check-in .       Problem list reviewed by Jaylin Smith RPH on 5/30/2024 at  2:47 PM  Problem list reviewed by Jaylin Smith RPH on 5/30/2024 at  2:48 PM  Medicines reviewed by Jaylin Smith RPH on 5/30/2024 at  2:47 PM  Medicines reviewed by Jaylin Smith RPH on 5/30/2024 at  2:48 PM  Allergies reviewed by Jaylin Smith RPH on 5/30/2024 at  2:47 PM     Goals Addressed Today        Specialty Pharmacy General Goal      Normalization of plt counts            Medication Assessment:  Medication Assessment  Follow Up Clinical Assessment  Linked Medication(s) Assessed: Hydroxyurea (Antineoplastics Misc.)  Therapeutic appropriateness: Appropriate  Medication tolerability: Tolerating with no to minimal ADRs  Medication plan: Continue therapy with normal follow-up  Quality of Life Improvement Scale: 5-No change  Administration: Patient is taking every day at the same time .   Patient can self administer medications: yes  Medication Follow-Up Plan: Next clinical assessment  Lab Review: The labs listed below have been reviewed. No dose adjustments are needed for the oral specialty medication(s) based on the labs.    Lab Results   Component Value Date    GLUCOSE 106 (H) 02/09/2024    CALCIUM 9.1 02/09/2024     02/09/2024    K 4.2 02/09/2024    CO2 27.2 02/09/2024     02/09/2024    BUN 17 02/09/2024    CREATININE 0.78 02/09/2024    EGFRIFNONA 79 01/13/2021    BCR 21.8 02/09/2024    ANIONGAP 8.8 02/09/2024     Lab Results   Component Value Date    WBC 9.53 04/05/2024    RBC 3.46 (L) 04/05/2024    HGB 12.0 04/05/2024    HCT 34.8 04/05/2024    .6 (H) 04/05/2024    MCH 34.7  (H) 04/05/2024    MCHC 34.5 04/05/2024    RDW 14.1 04/05/2024    RDWSD 51.7 04/05/2024    MPV 10.5 04/05/2024     04/05/2024    NEUTRORELPCT 68.2 04/05/2024    LYMPHORELPCT 16.9 (L) 04/05/2024    MONORELPCT 8.4 04/05/2024    EOSRELPCT 4.8 04/05/2024    BASORELPCT 0.9 04/05/2024    AUTOIGPER 0.8 (H) 04/05/2024    NEUTROABS 6.49 04/05/2024    LYMPHSABS 1.61 04/05/2024    MONOSABS 0.80 04/05/2024    EOSABS 0.46 (H) 04/05/2024    BASOSABS 0.09 04/05/2024    AUTOIGNUM 0.08 (H) 04/05/2024    NRBC 0.0 04/05/2024     Drug-drug interactions  Completed medication reconciliation today to assess for drug interactions. Patient denies starting or stopping any medications.    Assessed medication list for interactions, no significant drug interactions noted.   Advised patient to call the clinic if any new medications are started so we can assess for drug-drug interactions.  Drug-food interactions discussed:  none  Vaccines are coordinated by the patient's oncologist and primary care provider.    Allergies  Known allergies and reactions were discussed with the patient. The patient's chart has been reviewed for allergy information and updated as necessary.   Allergies   Allergen Reactions    Contrast Dye (Echo Or Unknown Ct/Mr) Swelling     FACIAL    Diclofenac Other (See Comments)     Bad heartburn causing pressure in the chest    Aspartame Itching    Morphine Headache    Heparin Unknown - Low Severity     HIT 2/11 per Dr. Neetu Dai    Lamisil [Terbinafine] Swelling     JOINT PAIN, REDNESS    Dexamethasone GI Intolerance    Prednisone Rash and GI Intolerance     CAN TAKE IF NEEDED        Hospitalizations and Urgent Care Visits Since Last Assessment:  Unplanned hospitalizations or inpatient admissions: no  ED Visits: no  Urgent Office Visits: no    Adherence Assessment:  Adherence Questions  Linked Medication(s) Assessed: Hydroxyurea (Antineoplastics Misc.)  On average, how many doses/injections does the patient miss per  month?: 0  What are the identified reasons for non-adherence or missed doses? : no problems identified  What is the estimated medication adherence level?: %  Based on the patient/caregiver response and refill history, does this patient require an MTP to track adherence improvements?: no    Quality of Life Assessment:  Quality of Life Assessment  Quality of Life Improvement Scale: 5-No change  -- Quality of Life: 8/10    Financial Assessment:  Medication availability/affordability: Patient has had no issues obtaining medication from pharmacy.    Attestation     I attest the patient was actively involved in and has agreed to the above plan of care.  If the prescribed therapy is at any point deemed not appropriate based on the current or future assessments, a consultation will be initiated with the patient's specialty care provider to determine the best course of action. The revised plan of therapy will be documented along with any required assessments and/or additional patient education provided.       All questions addressed and patient had no additional concerns. Patient has pharmacy contact information.    Name/Credentials: Jaylin Smith PharmD, BCPS    5/30/2024  14:48 EDT

## 2024-06-07 ENCOUNTER — LAB (OUTPATIENT)
Dept: OTHER | Facility: HOSPITAL | Age: 69
End: 2024-06-07
Payer: MEDICARE

## 2024-06-07 ENCOUNTER — CLINICAL SUPPORT (OUTPATIENT)
Dept: ONCOLOGY | Facility: HOSPITAL | Age: 69
End: 2024-06-07
Payer: MEDICARE

## 2024-06-07 DIAGNOSIS — Z15.89 JAK2 V617F MUTATION: ICD-10-CM

## 2024-06-07 DIAGNOSIS — D75.839 THROMBOCYTOSIS: ICD-10-CM

## 2024-06-07 DIAGNOSIS — M19.032 OSTEOARTHRITIS OF LEFT WRIST, UNSPECIFIED OSTEOARTHRITIS TYPE: ICD-10-CM

## 2024-06-07 DIAGNOSIS — D50.0 IRON DEFICIENCY ANEMIA DUE TO CHRONIC BLOOD LOSS: ICD-10-CM

## 2024-06-07 DIAGNOSIS — M19.042 OSTEOARTHRITIS OF BOTH HANDS, UNSPECIFIED OSTEOARTHRITIS TYPE: ICD-10-CM

## 2024-06-07 DIAGNOSIS — D47.1 MYELOPROLIFERATIVE DISORDER: ICD-10-CM

## 2024-06-07 DIAGNOSIS — M18.9 OSTEOARTHRITIS OF FIRST CARPOMETACARPAL (CMC) JOINT OF ONE HAND: ICD-10-CM

## 2024-06-07 DIAGNOSIS — M19.031 OSTEOARTHRITIS OF RIGHT WRIST, UNSPECIFIED OSTEOARTHRITIS TYPE: ICD-10-CM

## 2024-06-07 DIAGNOSIS — M19.041 OSTEOARTHRITIS OF BOTH HANDS, UNSPECIFIED OSTEOARTHRITIS TYPE: ICD-10-CM

## 2024-06-07 LAB
BASOPHILS # BLD AUTO: 0.1 10*3/MM3 (ref 0–0.2)
BASOPHILS NFR BLD AUTO: 1 % (ref 0–1.5)
DEPRECATED RDW RBC AUTO: 55.2 FL (ref 37–54)
EOSINOPHIL # BLD AUTO: 0.33 10*3/MM3 (ref 0–0.4)
EOSINOPHIL NFR BLD AUTO: 3.3 % (ref 0.3–6.2)
ERYTHROCYTE [DISTWIDTH] IN BLOOD BY AUTOMATED COUNT: 14.3 % (ref 12.3–15.4)
ERYTHROCYTE [SEDIMENTATION RATE] IN BLOOD: 19 MM/HR (ref 0–30)
HCT VFR BLD AUTO: 34.7 % (ref 34–46.6)
HGB BLD-MCNC: 11.9 G/DL (ref 12–15.9)
IMM GRANULOCYTES # BLD AUTO: 0.07 10*3/MM3 (ref 0–0.05)
IMM GRANULOCYTES NFR BLD AUTO: 0.7 % (ref 0–0.5)
LYMPHOCYTES # BLD AUTO: 1.46 10*3/MM3 (ref 0.7–3.1)
LYMPHOCYTES NFR BLD AUTO: 14.8 % (ref 19.6–45.3)
MCH RBC QN AUTO: 36 PG (ref 26.6–33)
MCHC RBC AUTO-ENTMCNC: 34.3 G/DL (ref 31.5–35.7)
MCV RBC AUTO: 104.8 FL (ref 79–97)
MONOCYTES # BLD AUTO: 0.81 10*3/MM3 (ref 0.1–0.9)
MONOCYTES NFR BLD AUTO: 8.2 % (ref 5–12)
NEUTROPHILS NFR BLD AUTO: 7.12 10*3/MM3 (ref 1.7–7)
NEUTROPHILS NFR BLD AUTO: 72 % (ref 42.7–76)
NRBC BLD AUTO-RTO: 0 /100 WBC (ref 0–0.2)
PLATELET # BLD AUTO: 378 10*3/MM3 (ref 140–450)
PMV BLD AUTO: 10.1 FL (ref 6–12)
RBC # BLD AUTO: 3.31 10*6/MM3 (ref 3.77–5.28)
WBC NRBC COR # BLD AUTO: 9.89 10*3/MM3 (ref 3.4–10.8)

## 2024-06-07 PROCEDURE — 36415 COLL VENOUS BLD VENIPUNCTURE: CPT

## 2024-06-07 PROCEDURE — 86200 CCP ANTIBODY: CPT | Performed by: STUDENT IN AN ORGANIZED HEALTH CARE EDUCATION/TRAINING PROGRAM

## 2024-06-07 PROCEDURE — 85652 RBC SED RATE AUTOMATED: CPT | Performed by: STUDENT IN AN ORGANIZED HEALTH CARE EDUCATION/TRAINING PROGRAM

## 2024-06-07 PROCEDURE — 86140 C-REACTIVE PROTEIN: CPT | Performed by: STUDENT IN AN ORGANIZED HEALTH CARE EDUCATION/TRAINING PROGRAM

## 2024-06-07 PROCEDURE — 86431 RHEUMATOID FACTOR QUANT: CPT | Performed by: STUDENT IN AN ORGANIZED HEALTH CARE EDUCATION/TRAINING PROGRAM

## 2024-06-07 PROCEDURE — 85025 COMPLETE CBC W/AUTO DIFF WBC: CPT | Performed by: INTERNAL MEDICINE

## 2024-06-07 NOTE — PROGRESS NOTES
Blanka Mueller is a 68 y.o. female with Thrombocytosis, Thrombophilia, Positive JAK2 mutation seen by Hematology/Oncology provider, Dr. Singh, and is scheduled with Clinical Review offered by [facility]. Patient's visit was held in clinic today.     Therapy plan  Hydroxyurea    Current Medication List  Medication Sig Start Date End Date Taking? Authorizing Provider   ascorbic acid (VITAMIN C) 500 MG tablet Take 2 tablets by mouth Daily. PT HOLDING FOR SURGERY    Kala eB MD   calcium carbonate (TUMS) 500 MG chewable tablet Chew 1 tablet Daily.  Patient not taking: Reported on 2/27/2024    Kala Be MD   Cholecalciferol (Vitamin D3) 50 MCG (2000 UT) capsule Take 1 capsule by mouth Daily.    Kala Be MD   Eliquis 5 MG tablet tablet Take 1 tablet by mouth Every 12 (Twelve) Hours. 3/16/23   Kala Be MD   Glucosamine-Chondroitin-MSM [id number] MG pack Take  by mouth.    Kala Be MD   hydroxyurea (HYDREA) 500 MG capsule Take 1 capsule ( 500 mg) by mouth  daily for 2 days, then take 2 caps ( 1000 mg) every third day. 2/9/24   Marc Singh MD   hydrOXYzine (ATARAX) 25 MG tablet Take 1-2 tablets by mouth As Needed. 3/28/23   Kala Be MD   levothyroxine (SYNTHROID, LEVOTHROID) 50 MCG tablet Take 1 tablet by mouth Daily. 12/8/22   Kala Be MD   Magnesium Oxide 400 (240 Mg) MG tablet Take 1 tablet by mouth Daily.    Kala Be MD   melatonin 3 MG tablet Take 1 tablet by mouth At Night As Needed for Sleep. 1/23/23   Nabil Osuna MD   multivitamin (THERAGRAN) tablet tablet Take 1 tablet by mouth Daily. PT HOLDING FOR SURGERY    Kala Be MD   nadolol (CORGARD) 20 MG tablet Take 1.5 tablets by mouth Every Morning.    Kala Be MD   Probiotic Product (PROBIOTIC DAILY PO) Take 1 capsule by mouth Every Night.    Kala Be MD   triamcinolone (KENALOG) 0.1 %  cream Apply 1 application  topically to the appropriate area as directed As Needed. 9/1/23 8/31/24  ProviderKala MD   Zinc 100 MG tablet Take 200 tablet/day by mouth.    Provider, MD Kala       Relevant Laboratory Values  Lab Results   Component Value Date    WBC 9.89 06/07/2024    RBC 3.31 (L) 06/07/2024    HGB 11.9 (L) 06/07/2024    HCT 34.7 06/07/2024    .8 (H) 06/07/2024    MCH 36.0 (H) 06/07/2024    MCHC 34.3 06/07/2024    RDW 14.3 06/07/2024    RDWSD 55.2 (H) 06/07/2024    MPV 10.1 06/07/2024     06/07/2024    NEUTRORELPCT 72.0 06/07/2024    LYMPHORELPCT 14.8 (L) 06/07/2024    MONORELPCT 8.2 06/07/2024    EOSRELPCT 3.3 06/07/2024    BASORELPCT 1.0 06/07/2024    AUTOIGPER 0.7 (H) 06/07/2024    NEUTROABS 7.12 (H) 06/07/2024    LYMPHSABS 1.46 06/07/2024    MONOSABS 0.81 06/07/2024    EOSABS 0.33 06/07/2024    BASOSABS 0.10 06/07/2024    AUTOIGNUM 0.07 (H) 06/07/2024    NRBC 0.0 06/07/2024       Clinical Review  Patient reports feeling well overall and reports good EGD report with absence of esophageal and gastric varices.     Patient reports adherence to Hydrea and alternates between 500 mg for two days and then 1000mg on the third day (every third day patient takes 1000mg Hydrea). Patient also reports adherence to Eliquis. Patient denies any signs/symptoms of bruising or bleeding.     Platelets 378 from 413 (4/5/24) from 356 (2/9/24) from 404 (1/12/24)      The patient's current therapy plan and above labs have been reviewed.     Plan  CBC reviewed, results are stable for this patient at this time. - see above in Laboratory Results  Will instruct Blanka Mueller to continue Hydrea at current dosage (takes 500mg for two days and 1000mg every third day).  Follow up in about 6 weeks with Dr. Bryant. Next scheduled appointment(s) reviewed with patient.  Patient is instructed to call the office with any concerns or new symptoms prior to next visit.  Verbal and written information  provided. Patient expresses understanding and has no further questions at this time.            Mary Fisher, PharmD  Clinical Pharmacy Services   [facility]  6/7/2024  13:45 EDT

## 2024-06-08 LAB
CCP IGA+IGG SERPL IA-ACNC: 1 UNITS (ref 0–19)
CRP SERPL-MCNC: 2 MG/L (ref 0–10)
RHEUMATOID FACT SERPL-ACNC: 12.6 IU/ML

## 2024-06-13 PROBLEM — I82.409 DVT (DEEP VENOUS THROMBOSIS): Status: ACTIVE | Noted: 2024-06-13

## 2024-06-25 RX ORDER — HYDROXYUREA 500 MG/1
CAPSULE ORAL
Qty: 135 CAPSULE | Refills: 1 | Status: SHIPPED | OUTPATIENT
Start: 2024-06-25

## 2024-06-26 ENCOUNTER — OFFICE VISIT (OUTPATIENT)
Age: 69
End: 2024-06-26
Payer: MEDICARE

## 2024-06-26 VITALS — TEMPERATURE: 97.8 F | BODY MASS INDEX: 24.19 KG/M2 | WEIGHT: 159.6 LBS | HEIGHT: 68 IN

## 2024-06-26 DIAGNOSIS — M19.041 OSTEOARTHRITIS OF BOTH HANDS, UNSPECIFIED OSTEOARTHRITIS TYPE: ICD-10-CM

## 2024-06-26 DIAGNOSIS — M19.031 OSTEOARTHRITIS OF RIGHT WRIST, UNSPECIFIED OSTEOARTHRITIS TYPE: ICD-10-CM

## 2024-06-26 DIAGNOSIS — M18.0 ARTHRITIS OF CARPOMETACARPAL (CMC) JOINT OF BOTH THUMBS: ICD-10-CM

## 2024-06-26 DIAGNOSIS — M19.032 OSTEOARTHRITIS OF LEFT WRIST, UNSPECIFIED OSTEOARTHRITIS TYPE: Primary | ICD-10-CM

## 2024-06-26 DIAGNOSIS — M19.042 OSTEOARTHRITIS OF BOTH HANDS, UNSPECIFIED OSTEOARTHRITIS TYPE: ICD-10-CM

## 2024-06-26 NOTE — PROGRESS NOTES
"Chief Complaint   Patient presents with    Right Wrist - Follow-up    Left Wrist - Follow-up       History of Present Illness  Blanka is a 68 y.o. year old RHD female here today for bilateral wrist pain that has been present for years but with progressive worsening with no known injury or trauma. Initial x-rays showed diffuse degenerative changes throughout the hands, most notably at the first CMC and MCP joints bilaterally. Additionally, at the right wrist there were advanced degenerative changes at the distal radial ulnar joint, including small chronic appearing calcification adjacent to the ulnar styloid and narrowing at the radiocarpal joint.  I felt her symptoms were related to degenerative changes, however given the advanced nature of the changes at the DRUJ with no history of significant trauma or injury, blood work was ordered to rule out possible inflammatory causes. I recommended conservative management with bracing and topical Voltaren. Please see previous notes for complete history and treatment course. She returns today reporting significant improvement. Pain is rated 1/10 (previously 8/10). She states that the hands and wrists feel \"all good\".  She has been helping her daughter move and even with increased activity denies any significant symptoms. She wore the right thumb spica brace for about a week but has since stopped as pain is improved.  She was initially using topical diclofenac more frequently. She has been taking an arthritis collagen supplement and feels like that has been beneficial. No new injuries or complaints.    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   Stays active and does a lot of building, gardening.   Has a history of knee OA being managed by Dr. Casarez/Arturo Olivo    Lab Results   Component Value Date    SEDRATE 19 06/07/2024       Rheumatoid Arthritis (RA) Profile  Order: [id number]  Status: Final result       Visible to patient: Yes (seen)       Next appt: 08/14/2024 " "at 10:30 AM in Vascular Surgery (Mary Ortega MD)       Dx: Osteoarthritis of first carpometacarp...    Specimen Information: Blood   2 Result Notes       1 Patient Communication      Component  Ref Range & Units 2 wk ago   RA Latex Turbid  <14.0 IU/mL 12.6   CCP Antibodies IgG/IgA  0 - 19 units 1   Comment:                           Negative               <20                            Weak positive      20 - 39                            Moderate positive  40 - 59                            Strong positive        >59   Resulting Agency [facility]              Narrative  Performed by: [facility]  Performed at:  01 - [facility] [address]  [id number]  : Arian Coughlin PhD, Phone:  [phone]      Specimen Collected: 06/07/24 13:33 EDT Last Resulted: 06/08/24 14:09 EDT             C-reactive Protein  Order: [id number]  Status: Final result    Visible to patient: Yes (seen)    Next appt: 08/14/2024 at 10:30 AM in Vascular Surgery (Mary Ortega MD)       Dx: Osteoarthritis of first carpometacarp...    Specimen Information: Blood   0 Result Notes       1 Patient Communication      Component  Ref Range & Units 2 wk ago   C-Reactive Protein  0 - 10 mg/L 2   Resulting Agency [facility]              Narrative  Performed by: [facility]  Performed at:  [address]  [address]  [id number]  : Arian Coughlin PhD, Phone:  [phone]      Specimen Collected: 06/07/24 13:33 EDT Last Resulted: 06/08/24 10:10 EDT             Temp 97.8 °F (36.6 °C)   Ht 172.7 cm (68\")   Wt 72.4 kg (159 lb 9.6 oz)   BMI 24.27 kg/m²        Physical Exam  MSK Exam:  The bilateral hands and wrists are without obvious signs of acute bony deformity. There are diffuse chronic bony changes throughout the hands, including very mild ulnar deviation at the first digit which she states is new. Soft tissue swelling at the MCP joints seems improved compared to previous. Mild persistent " tenderness to the dorsal mid to ulnar aspect joint line on the right.  Mild persistent tenderness to the bilateral first CMC joints. Active and passive range of motion at the wrist is symmetrical and without pain. Sensory and vascular exams are otherwise normal.       Assessment and Plan  Diagnoses and all orders for this visit:    1. Osteoarthritis of left wrist, unspecified osteoarthritis type (Primary)    2. Osteoarthritis of right wrist, unspecified osteoarthritis type    3. Arthritis of carpometacarpal (CMC) joint of both thumbs    4. Osteoarthritis of both hands, unspecified osteoarthritis type    Blanka is a 68 y.o. year old RHD female here today for follow-up of bilateral wrist pain that has been present for years but with progressive worsening with no known injury or trauma. Initial x-rays showed diffuse degenerative changes throughout the hands, most notably at the first CMC and MCP joints bilaterally. Additionally, at the right wrist there were advanced degenerative changes at the distal radial ulnar joint, including small chronic appearing calcification adjacent to the ulnar styloid and narrowing at the radiocarpal joint.  I felt her symptoms were related to degenerative changes, however given the advanced nature of the changes at the DRUJ with no history of significant trauma or injury, blood work was ordered to rule out possible inflammatory causes. I recommended conservative management with bracing and topical Voltaren.  She returns today reporting significant improvement in her symptoms.  We discussed the possibility of intra-articular injections, but given the improvement in her symptoms I do not feel it is necessary at this time and she agrees. I recommended continued supportive care as needed if pain worsens. Discussed possible OTC thumb bracing for CMC arthritis as needed.  We will follow-up as needed if symptoms persist or worsen. If she does develop worsening or recurrent swelling, may  consider referral to rheumatology for evaluation. All of her questions were answered and she is agreeable with the plan.    Dictated utilizing Dragon dictation.

## 2024-08-14 ENCOUNTER — OFFICE VISIT (OUTPATIENT)
Age: 69
End: 2024-08-14
Payer: MEDICARE

## 2024-08-14 VITALS
SYSTOLIC BLOOD PRESSURE: 130 MMHG | BODY MASS INDEX: 23.79 KG/M2 | DIASTOLIC BLOOD PRESSURE: 74 MMHG | HEIGHT: 68 IN | WEIGHT: 157 LBS | HEART RATE: 68 BPM

## 2024-08-14 DIAGNOSIS — Z15.89 JAK2 V617F MUTATION: ICD-10-CM

## 2024-08-14 DIAGNOSIS — K76.6 PORTAL HYPERTENSION WITH ESOPHAGEAL VARICES: ICD-10-CM

## 2024-08-14 DIAGNOSIS — I85.00 PORTAL HYPERTENSION WITH ESOPHAGEAL VARICES: ICD-10-CM

## 2024-08-14 DIAGNOSIS — I82.542 CHRONIC DEEP VEIN THROMBOSIS (DVT) OF TIBIAL VEIN OF LEFT LOWER EXTREMITY: Primary | ICD-10-CM

## 2024-08-14 NOTE — PROGRESS NOTES
Chief Complaint  Deep Vein Thrombosis    Subjective        Blanka Mueller presents to [facility] VASCULAR SURGERY  History of Present Illness  69 y.o. 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  She is on indefinite anticoagulation, now with eliquis, for her thrombophilia.  She returns today for a 1 year follow up.  She has had gum bleeding for the last 1-2 weeks.  She is taking Eliquis 5 mg BID.  She was followed by Dr. Singh but is now transitioning her care to Dr. Bryant. No new leg complaints, no pain, no swelling.      Past Medical History:   Diagnosis Date    Acute (reversible) ischemia of intestine, part and extent unspecified 01/20/2023    Acute embolism and thrombosis of unspecified deep veins of left lower extremity 01/20/2023    Acute torn meniscus     Right    Ankle sprain 1967    many times as a kid    Arthritis     DEGENERATIVE HANDS AND FEET    Arthritis of back     Arthritis of neck 2012    femoral neck in osteopenic range    CTS (carpal tunnel syndrome) 2011    or degenerative arthritis    Difficulty urinating     Dry eyes     Eczema     Esophageal varices     Essential (primary) hypertension     partial    Fracture, foot 2021    not a fracture but you provide no space for my feet problem of arthritus/bunions/constant pain/flat footed    Gastritis     Great toe pain     Hiatal hernia     History of dizziness     History of DVT (deep vein thrombosis)     Hypertension     Hypothyroid 2022    Knee swelling 2020    Legal blindness     LEFT EYE    Low back strain 1980    not as bad now    Mesenteric venous thrombosis     PORTAL AND SPLENIC    Other primary thrombophilia 01/20/2023    Pain     ABD WITH CERTAIN FOOD    Pelvic pressure in female     WITH CERTAIN MOVEMENTS AND WHEN SITTING    Portal hypertension     Portal vein thrombosis      Splenic vein thrombosis     Splenomegaly     Tear of meniscus of knee 2020    R knee osteoarthritis/torn Mesiscus 2 places    Thyroid disease     Ureter, stricture     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 AND BEING FOLLOWED BY MICHELLE DESIR MD       Past Surgical History:   Procedure Laterality Date    APPENDECTOMY N/A 1985    CHOLECYSTECTOMY  2001    COLONOSCOPY N/A 2019    CYSTOSCOPY Bilateral 10/14/2020    Procedure: CYSTOSCOPY RETROGRADE PYELOGRAM;  Surgeon: Jaiden Desir MD;  Location: [facility] OR;  Service: Urology;  Laterality: Bilateral;    CYSTOSCOPY W/ URETERAL STENT REMOVAL      ENDOSCOPY N/A 2019    ENDOSCOPY Left 01/23/2023    Procedure: ESOPHAGOGASTRODUODENOSCOPY AT BEDSIDE;  Surgeon: Livia Mcmanus MD;  Location: [facility] ENDOSCOPY;  Service: Gastroenterology;  Laterality: Left;  PRE- HEMATOCHEZIA, ESOPHAGEAL VARICES, GASTRIC VARIX    EXPLORATORY LAPAROTOMY N/A 2001    EXPLORATORY LAPAROTOMY      ORIGINALLY LOOKING FOR OVARIAN CANCER, WAS NOT FOUND.  FOUND MESENTERIC/PORTAL/SPLENIC VENOUS THROMBOSIS    FOOT FUSION Left 10/25/2022    Procedure: Left first metatarsal phalangeal joint fusion;  Surgeon: Ulises Fairchild MD;  Location: [facility] OR OSC;  Service: Orthopedics;  Laterality: Left;    HERNIA REPAIR N/A     HERNIA REPAIR  2021    INGUINAL HERNIA REPAIR Left 01/20/2021    Procedure: open ventral hernia repair with mesh;  Surgeon: Yaonna Jacobo MD;  Location: [facility] OR;  Service: General;  Laterality: Left;    LAPAROSCOPIC CHOLECYSTECTOMY N/A 2001    TONSILLECTOMY  1971    URETERAL STENT INSERTION Left 2016    left ureter, temporary       Family History   Problem Relation Age of Onset    Osteoporosis Mother     Heart disease Father     Tuberculosis Father     Breast cancer Sister     Cancer Sister         lymphnodes    Cancer Sister         pallete    Osteoporosis Sister     Colon cancer Paternal Grandfather     Malig Hyperthermia Neg Hx          Social History     Tobacco Use    Smoking  status: Never     Passive exposure: Never    Smokeless tobacco: Never    Tobacco comments:     Patient does not smoke   Vaping Use    Vaping status: Never Used   Substance Use Topics    Alcohol use: Yes     Comment: glass of wine a month with dinner; Patient is non drinker.    Drug use: Never       Allergies: Contrast dye (echo or unknown ct/mr), Diclofenac, Aspartame, Morphine, Heparin, Lamisil [terbinafine], Dexamethasone, and Prednisone    Prior to Admission medications    Medication Sig Start Date End Date Taking? Authorizing Provider   acetaminophen (TYLENOL) 500 MG tablet Take 1 tablet by mouth Every 6 (Six) Hours As Needed for Mild Pain.   Yes Kala Be MD   ascorbic acid (VITAMIN C) 500 MG tablet Take 2 tablets by mouth Daily. PT HOLDING FOR SURGERY   Yes Kala Be MD   calcium carbonate (TUMS) 500 MG chewable tablet Chew 1 tablet Daily.   Yes Kala Be MD   Cholecalciferol (Vitamin D3) 50 MCG (2000 UT) capsule Take 1 capsule by mouth Daily.   Yes Kala Be MD   Eliquis 5 MG tablet tablet Take 1 tablet by mouth Every 12 (Twelve) Hours. 3/16/23  Yes Kala Be MD   hydroxyurea (HYDREA) 500 MG capsule Take 1 capsule ( 500 mg) by mouth  daily for 2 days, then take 2 caps ( 1000 mg) every third day. 6/25/24  Yes Cruz Ceja MD PhD   levothyroxine (SYNTHROID, LEVOTHROID) 50 MCG tablet Take 1 tablet by mouth Daily. 12/8/22  Yes Kala Be MD   Magnesium Oxide 400 (240 Mg) MG tablet Take 1 tablet by mouth Daily.   Yes Kala Be MD   Misc Natural Products (JOINT HEALTH PO) Take  by mouth.   Yes Kala Be MD   multivitamin (THERAGRAN) tablet tablet Take 1 tablet by mouth Daily. PT HOLDING FOR SURGERY   Yes Kala Be MD   NADOLOL PO Take 30 mg by mouth Daily. Nadolol 30 MG Tablet (sig: tablet once per day )   Yes Kala Be MD   Probiotic Product (PROBIOTIC DAILY PO) Take 1 capsule by mouth Every  "Night.   Yes Kala Be MD   Zinc 100 MG tablet Take 200 tablet/day by mouth.   Yes Kala Be MD   Glucosamine-Chondroitin-MSM 1785-3595-913 MG pack Take  by mouth.  Patient not taking: Reported on 5/17/2024    Kala Be MD   hydrOXYzine (ATARAX) 25 MG tablet Take 1-2 tablets by mouth As Needed.  Patient not taking: Reported on 5/17/2024 3/28/23   Kala Be MD   melatonin 3 MG tablet Take 1 tablet by mouth At Night As Needed for Sleep.  Patient not taking: Reported on 5/17/2024 1/23/23   Nabil Osuna MD   nadolol (CORGARD) 20 MG tablet Take 1.5 tablets by mouth Every Morning.    Kala Be MD   triamcinolone (KENALOG) 0.1 % cream Apply 1 application  topically to the appropriate area as directed As Needed.  Patient not taking: Reported on 5/17/2024 9/1/23 8/31/24  Kala Be MD   WARFARIN SODIUM PO Take 9 mg by mouth Daily. Warfarin Sodium 9 MG Tablet (sig: tablet once per day )  Patient not taking: Reported on 6/26/2024    Kala Be MD         Objective   Vital Signs:  /74   Pulse 68   Ht 172.7 cm (68\")   Wt 71.2 kg (157 lb)   BMI 23.87 kg/m²   Estimated body mass index is 23.87 kg/m² as calculated from the following:    Height as of this encounter: 172.7 cm (68\").    Weight as of this encounter: 71.2 kg (157 lb).       BMI is within normal parameters. No other follow-up for BMI required.       Physical Exam  Vitals reviewed.   Constitutional:       General: She is not in acute distress.     Appearance: Normal appearance.   HENT:      Head: Normocephalic and atraumatic.      Right Ear: External ear normal.      Left Ear: External ear normal.      Nose: Nose normal.      Mouth/Throat:      Mouth: Mucous membranes are moist.      Pharynx: Oropharynx is clear.   Eyes:      Extraocular Movements: Extraocular movements intact.      Conjunctiva/sclera: Conjunctivae normal.      Pupils: Pupils are equal, round, and reactive " to light.   Cardiovascular:      Rate and Rhythm: Normal rate and regular rhythm.      Pulses:           Carotid pulses are 2+ on the right side and 2+ on the left side.       Radial pulses are 2+ on the right side and 2+ on the left side.        Dorsalis pedis pulses are 2+ on the right side and 2+ on the left side.        Posterior tibial pulses are 2+ on the right side and 2+ on the left side.   Pulmonary:      Comments: Non labored respirations on room air, equal chest rise  Abdominal:      General: Abdomen is flat. There is no distension.      Palpations: Abdomen is soft.   Musculoskeletal:      Cervical back: Normal range of motion. No rigidity.      Right lower leg: No edema.      Left lower leg: No edema.   Skin:     General: Skin is warm and dry.      Capillary Refill: Capillary refill takes less than 2 seconds.   Neurological:      General: No focal deficit present.      Mental Status: She is alert and oriented to person, place, and time.   Psychiatric:         Mood and Affect: Mood normal.         Behavior: Behavior normal.        Result Review :                  Assessment and Plan     Diagnoses and all orders for this visit:    1. Chronic deep vein thrombosis (DVT) of tibial vein of left lower extremity (Primary)    2. JAK2 [id number] mutation    3. Portal hypertension with esophageal varices      Blanka Cancino is a 69 year old female with a history of JAK2 mutation and thrombophilia who had extensive mesenteric venous thrombosis and now has portal hypertension with varices as a result.  She was treated for a lower extremity DVT last year and has had pulmonary emboli in the past.  Her DVT was chronic on her last duplex and she denies any new symptoms of leg pain or swelling.  She has been doing relatively well on eliquis 5 mg BID but she has had gum bleeding over the last 2 weeks.  I checked her last CBC from 6/7/24 and her platelet count was normal at 378,000, hemoglobin was 12 g/dl, and hematocrit was  35%.  I offered to recheck her platelet count but she will defer until Friday when she is getting this checked at Dr. Bryant's office.  She will need lifelong anticoagulation for her thrombophilia.  We will only obtain ultrasounds as needed if she develops new symptoms.  I will plan to follow up with her in 1 year.  She will contact us if anything new issues develop in the interim.           Follow Up     Return in about 1 year (around 8/14/2025).  Patient was given instructions and counseling regarding her condition or for health maintenance advice. Please see specific information pulled into the AVS if appropriate.

## 2024-08-15 NOTE — PROGRESS NOTES
.     REASONS FOR FOLLOWUP: Essential thrombocythemia    HISTORY OF PRESENT ILLNESS:  The patient is a 69 y.o. year old female  who is here for follow-up with the above-mentioned history.    No new events since last visit.  Denies fevers, chills, weight loss, night sweats.  Denies unusual headaches, unusual vision changes, or stroke like symptoms.  Denies chest pain or shortness of air.  Denies pain or swelling of one leg more than the other leg.  Denies itching after hot showers.     Past Medical History:   Diagnosis Date    Acute (reversible) ischemia of intestine, part and extent unspecified 01/20/2023    Acute embolism and thrombosis of unspecified deep veins of left lower extremity 01/20/2023    Acute torn meniscus     Right    Ankle sprain 1967    many times as a kid    Arthritis     DEGENERATIVE HANDS AND FEET    Arthritis of back     Arthritis of neck 2012    femoral neck in osteopenic range    CTS (carpal tunnel syndrome) 2011    or degenerative arthritis    Difficulty urinating     Dry eyes     Eczema     Esophageal varices     Essential (primary) hypertension     partial    Fracture, foot 2021    not a fracture but you provide no space for my feet problem of arthritus/bunions/constant pain/flat footed    Gastritis     Great toe pain     Hiatal hernia     History of dizziness     History of DVT (deep vein thrombosis)     Hypertension     Hypothyroid 2022    Knee swelling 2020    Legal blindness     LEFT EYE    Low back strain 1980    not as bad now    Mesenteric venous thrombosis     PORTAL AND SPLENIC    Other primary thrombophilia 01/20/2023    Pain     ABD WITH CERTAIN FOOD    Pelvic pressure in female     WITH CERTAIN MOVEMENTS AND WHEN SITTING    Portal hypertension     Portal vein thrombosis     Splenic vein thrombosis     Splenomegaly     Tear of meniscus of knee 2020    R knee osteoarthritis/torn Mesiscus 2 places    Thyroid disease     Ureter, stricture     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 AND BEING  FOLLOWED BY MICHELLE DESIR MD     Past Surgical History:   Procedure Laterality Date    APPENDECTOMY N/A 1985    CHOLECYSTECTOMY  2001    COLONOSCOPY N/A 2019    CYSTOSCOPY Bilateral 10/14/2020    Procedure: CYSTOSCOPY RETROGRADE PYELOGRAM;  Surgeon: Jaiden Desir MD;  Location: [facility] MAIN OR;  Service: Urology;  Laterality: Bilateral;    CYSTOSCOPY W/ URETERAL STENT REMOVAL      ENDOSCOPY N/A 2019    ENDOSCOPY Left 01/23/2023    Procedure: ESOPHAGOGASTRODUODENOSCOPY AT BEDSIDE;  Surgeon: Livia Mcmanus MD;  Location: [facility] ENDOSCOPY;  Service: Gastroenterology;  Laterality: Left;  PRE- HEMATOCHEZIA, ESOPHAGEAL VARICES, GASTRIC VARIX    EXPLORATORY LAPAROTOMY N/A 2001    EXPLORATORY LAPAROTOMY      ORIGINALLY LOOKING FOR OVARIAN CANCER, WAS NOT FOUND.  FOUND MESENTERIC/PORTAL/SPLENIC VENOUS THROMBOSIS    FOOT FUSION Left 10/25/2022    Procedure: Left first metatarsal phalangeal joint fusion;  Surgeon: Ulises Fairchild MD;  Location: [facility] OR OSC;  Service: Orthopedics;  Laterality: Left;    HERNIA REPAIR N/A     HERNIA REPAIR  2021    INGUINAL HERNIA REPAIR Left 01/20/2021    Procedure: open ventral hernia repair with mesh;  Surgeon: Yoanna Jacobo MD;  Location: [facility] MAIN OR;  Service: General;  Laterality: Left;    LAPAROSCOPIC CHOLECYSTECTOMY N/A 2001    TONSILLECTOMY  1971    URETERAL STENT INSERTION Left 2016    left ureter, temporary       MEDICATIONS    Current Outpatient Medications:     acetaminophen (TYLENOL) 500 MG tablet, Take 1 tablet by mouth Every 6 (Six) Hours As Needed for Mild Pain., Disp: , Rfl:     ascorbic acid (VITAMIN C) 500 MG tablet, Take 2 tablets by mouth Daily. PT HOLDING FOR SURGERY, Disp: , Rfl:     calcium carbonate (TUMS) 500 MG chewable tablet, Chew 1 tablet Daily., Disp: , Rfl:     Cholecalciferol (Vitamin D3) 50 MCG (2000 UT) capsule, Take 1 capsule by mouth Daily., Disp: , Rfl:     Eliquis 5 MG tablet tablet, Take 1 tablet by mouth Every 12 (Twelve) Hours.,  Disp: , Rfl:     hydroxyurea (HYDREA) 500 MG capsule, Take 1 capsule ( 500 mg) by mouth  daily for 2 days, then take 2 caps ( 1000 mg) every third day., Disp: 135 capsule, Rfl: 1    levothyroxine (SYNTHROID, LEVOTHROID) 50 MCG tablet, Take 1 tablet by mouth Daily., Disp: , Rfl:     Magnesium Oxide 400 (240 Mg) MG tablet, Take 1 tablet by mouth Daily., Disp: , Rfl:     Misc Natural Products (JOINT HEALTH PO), Take  by mouth., Disp: , Rfl:     multivitamin (THERAGRAN) tablet tablet, Take 1 tablet by mouth Daily. PT HOLDING FOR SURGERY, Disp: , Rfl:     NADOLOL PO, Take 30 mg by mouth Daily. Nadolol 30 MG Tablet (sig: tablet once per day ), Disp: , Rfl:     Polyethyl Glycol-Propyl Glycol (SYSTANE FREE OP), Apply  to eye(s) as directed by provider 4 (Four) Times a Day., Disp: , Rfl:     Probiotic Product (PROBIOTIC DAILY PO), Take 1 capsule by mouth Every Night., Disp: , Rfl:     Zinc 100 MG tablet, Take 200 tablet/day by mouth., Disp: , Rfl:     Glucosamine-Chondroitin-MSM 8823-3979-574 MG pack, Take  by mouth. (Patient not taking: Reported on 5/17/2024), Disp: , Rfl:     hydrOXYzine (ATARAX) 25 MG tablet, Take 1-2 tablets by mouth As Needed. (Patient not taking: Reported on 5/17/2024), Disp: , Rfl:     melatonin 3 MG tablet, Take 1 tablet by mouth At Night As Needed for Sleep. (Patient not taking: Reported on 5/17/2024), Disp: 30 tablet, Rfl:     nadolol (CORGARD) 20 MG tablet, Take 1.5 tablets by mouth Every Morning., Disp: , Rfl:     triamcinolone (KENALOG) 0.1 % cream, Apply 1 application  topically to the appropriate area as directed As Needed. (Patient not taking: Reported on 5/17/2024), Disp: , Rfl:     WARFARIN SODIUM PO, Take 9 mg by mouth Daily. Warfarin Sodium 9 MG Tablet (sig: tablet once per day ) (Patient not taking: Reported on 6/26/2024), Disp: , Rfl:     ALLERGIES:     Allergies   Allergen Reactions    Contrast Dye (Echo Or Unknown Ct/Mr) Swelling     FACIAL    Diclofenac Other (See Comments)     Bad  "heartburn causing pressure in the chest    Aspartame Itching    Morphine Headache    Heparin Unknown - Low Severity     HIT 2/11 per Dr. Neetu Dai    Lamisil [Terbinafine] Swelling     JOINT PAIN, REDNESS    Zoledronic Acid Other (See Comments)     \" Felt achy and felt like I got hit by a [employer] truck.\"    Dexamethasone GI Intolerance    Prednisone Rash and GI Intolerance     CAN TAKE IF NEEDED       SOCIAL HISTORY:       Social History     Socioeconomic History    Marital status:     Number of children: 3   Tobacco Use    Smoking status: Never     Passive exposure: Never    Smokeless tobacco: Never    Tobacco comments:     Patient does not smoke   Vaping Use    Vaping status: Never Used   Substance and Sexual Activity    Alcohol use: Yes     Comment: glass of wine a month with dinner; Patient is non drinker.    Drug use: Never    Sexual activity: Never         FAMILY HISTORY:  Family History   Problem Relation Age of Onset    Osteoporosis Mother     Heart disease Father     Tuberculosis Father     Breast cancer Sister     Cancer Sister         lymphnodes    Cancer Sister         pallete    Osteoporosis Sister     Colon cancer Paternal Grandfather     Malig Hyperthermia Neg Hx        REVIEW OF SYSTEMS:  Review of Systems   Constitutional:  Negative for activity change.   HENT:  Negative for nosebleeds and trouble swallowing.    Respiratory:  Negative for shortness of breath and wheezing.    Cardiovascular:  Negative for chest pain and palpitations.   Gastrointestinal:  Negative for constipation, diarrhea and nausea.   Genitourinary:  Negative for dysuria and hematuria.   Musculoskeletal:  Negative for arthralgias and myalgias.   Skin:  Negative for rash and wound.   Neurological:  Negative for seizures and syncope.   Hematological:  Negative for adenopathy. Does not bruise/bleed easily.   Psychiatric/Behavioral:  Negative for confusion.             Vitals:    08/16/24 1239   BP: 115/72   Pulse: 67   Resp: 16 " "  Temp: 97.7 °F (36.5 °C)   TempSrc: Oral   SpO2: 98%   Weight: 71.1 kg (156 lb 11.2 oz)   Height: 172 cm (67.72\")   PainSc: 0-No pain         8/16/2024    12:45 PM   Current Status   ECOG score 0        PHYSICAL EXAM:        CONSTITUTIONAL:  Vital signs reviewed.  No distress, looks comfortable.  EYES:  Conjunctiva and lids unremarkable.  PERRLA  EARS,NOSE,MOUTH,THROAT:  Ears and nose appear unremarkable.  Lips, teeth, gums appear unremarkable.  RESPIRATORY:  Normal respiratory effort.  Lungs clear to auscultation bilaterally.  CARDIOVASCULAR:  Normal S1, S2.  No murmurs rubs or gallops.  No significant lower extremity edema.  GASTROINTESTINAL: Abdomen appears unremarkable.  Nontender.  No hepatomegaly.  No splenomegaly.  LYMPHATIC:  No cervical, supraclavicular, axillary lymphadenopathy.  SKIN:  Warm.  No rashes.  PSYCHIATRIC:  Normal judgment and insight.  Normal mood and affect.       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  On Hydrea 1000 mg twice per week, 500 mg other days    *Thrombophilia with a history of extensive mesenteric vein, portal vein, and splenic vein thrombosis around 2001.  And bilateral PE at some point  Bilateral pulmonary emboli at some point.  Records unclear as to when  [facility] with no obvious thrombophilic defect.  On Coumadin including " after left peroneal vein DVT 12/19/2022, after surgery and left foot for osteophyte.  Had some subtherapeutic INR's around that time.  3/21/2023, Dr. Singh changed her to Eliquis  No signs to suggest recurrent clotting.  No bleeding    *Iron deficiency anemia  Does not respond to oral iron due to poor absorption.  History of Injectafer (in the setting of a myeloproliferative disorder, but given due to anemia)  8/16/2024: Ferritin 178, 30% saturation, Hb 12.4.    *Portal hypertension leading to esophageal and rectal varices    *January 2023: Extensive GI bleeding with varices requiring banding of esophageal and rectal varices at [facility]    *Splenic artery embolization by interventional radiology at [facility], 2/1/2023    *Elevated AST on 8/16/2024 and previously chronically elevated alkaline phosphatase  8/16/2024: Denies alcohol.  Takes 325 mg of acetaminophen per day.  Follows with Dr. Ky Erwin, and sees him on 8/19/2024.  She will discuss the elevated AST with him.  Defer further follow-up or treatment of this to him    *8/16/2024: Initial visit with me (prior patient of Dr. Singh's).    Plan  She has been having CBC every 2 months with nurse review  MD 6 months.  1 week prior: Ferritin, iron panel, CBC, reticulate hemoglobin, CMP  Following iron labs because has been anemic from iron deficiency in the past, which required IV iron  (But will use caution regarding IV iron considering myeloproliferative disorder)  Continue Hydrea.  Last dose change was 2/9/2024, from I believe 500 mg daily up to 1000 mg twice per week and 500 mg on other days   Continue Eliquis 5 mg twice per day    I am following her longitudinally  40 minutes.  Total time.  Same day.

## 2024-08-16 ENCOUNTER — OFFICE VISIT (OUTPATIENT)
Dept: ONCOLOGY | Facility: CLINIC | Age: 69
End: 2024-08-16
Payer: MEDICARE

## 2024-08-16 ENCOUNTER — LAB (OUTPATIENT)
Dept: OTHER | Facility: HOSPITAL | Age: 69
End: 2024-08-16
Payer: MEDICARE

## 2024-08-16 VITALS
WEIGHT: 156.7 LBS | OXYGEN SATURATION: 98 % | SYSTOLIC BLOOD PRESSURE: 115 MMHG | DIASTOLIC BLOOD PRESSURE: 72 MMHG | RESPIRATION RATE: 16 BRPM | TEMPERATURE: 97.7 F | HEIGHT: 68 IN | HEART RATE: 67 BPM | BODY MASS INDEX: 23.75 KG/M2

## 2024-08-16 DIAGNOSIS — D75.839 THROMBOCYTOSIS: Primary | ICD-10-CM

## 2024-08-16 DIAGNOSIS — D50.0 IRON DEFICIENCY ANEMIA DUE TO CHRONIC BLOOD LOSS: ICD-10-CM

## 2024-08-16 DIAGNOSIS — D75.839 THROMBOCYTOSIS: ICD-10-CM

## 2024-08-16 DIAGNOSIS — Z15.89 JAK2 V617F MUTATION: ICD-10-CM

## 2024-08-16 DIAGNOSIS — D47.1 MYELOPROLIFERATIVE DISORDER: ICD-10-CM

## 2024-08-16 LAB
ALBUMIN SERPL-MCNC: 3.7 G/DL (ref 3.5–5.2)
ALBUMIN/GLOB SERPL: 0.9 G/DL
ALP SERPL-CCNC: 104 U/L (ref 39–117)
ALT SERPL W P-5'-P-CCNC: 26 U/L (ref 1–33)
ANION GAP SERPL CALCULATED.3IONS-SCNC: 10.1 MMOL/L (ref 5–15)
AST SERPL-CCNC: 38 U/L (ref 1–32)
BASOPHILS # BLD AUTO: 0.08 10*3/MM3 (ref 0–0.2)
BASOPHILS NFR BLD AUTO: 0.9 % (ref 0–1.5)
BILIRUB SERPL-MCNC: 0.8 MG/DL (ref 0–1.2)
BUN SERPL-MCNC: 18 MG/DL (ref 8–23)
BUN/CREAT SERPL: 30.5 (ref 7–25)
CALCIUM SPEC-SCNC: 9.1 MG/DL (ref 8.6–10.5)
CHLORIDE SERPL-SCNC: 105 MMOL/L (ref 98–107)
CO2 SERPL-SCNC: 23.9 MMOL/L (ref 22–29)
CREAT SERPL-MCNC: 0.59 MG/DL (ref 0.57–1)
DEPRECATED RDW RBC AUTO: 55.4 FL (ref 37–54)
EGFRCR SERPLBLD CKD-EPI 2021: 98.9 ML/MIN/1.73
EOSINOPHIL # BLD AUTO: 0.33 10*3/MM3 (ref 0–0.4)
EOSINOPHIL NFR BLD AUTO: 3.9 % (ref 0.3–6.2)
ERYTHROCYTE [DISTWIDTH] IN BLOOD BY AUTOMATED COUNT: 14.1 % (ref 12.3–15.4)
FERRITIN SERPL-MCNC: 177.8 NG/ML (ref 13–150)
GLOBULIN UR ELPH-MCNC: 3.9 GM/DL
GLUCOSE SERPL-MCNC: 103 MG/DL (ref 65–99)
HCT VFR BLD AUTO: 37.9 % (ref 34–46.6)
HGB BLD-MCNC: 12.4 G/DL (ref 12–15.9)
IMM GRANULOCYTES # BLD AUTO: 0.04 10*3/MM3 (ref 0–0.05)
IMM GRANULOCYTES NFR BLD AUTO: 0.5 % (ref 0–0.5)
IRON 24H UR-MRATE: 91 MCG/DL (ref 37–145)
IRON SATN MFR SERPL: 30 % (ref 20–50)
LDH SERPL-CCNC: 525 U/L (ref 135–214)
LYMPHOCYTES # BLD AUTO: 1.5 10*3/MM3 (ref 0.7–3.1)
LYMPHOCYTES NFR BLD AUTO: 17.6 % (ref 19.6–45.3)
MACROCYTES BLD QL SMEAR: NORMAL
MCH RBC QN AUTO: 35 PG (ref 26.6–33)
MCHC RBC AUTO-ENTMCNC: 32.7 G/DL (ref 31.5–35.7)
MCV RBC AUTO: 107.1 FL (ref 79–97)
MONOCYTES # BLD AUTO: 0.82 10*3/MM3 (ref 0.1–0.9)
MONOCYTES NFR BLD AUTO: 9.6 % (ref 5–12)
NEUTROPHILS NFR BLD AUTO: 5.74 10*3/MM3 (ref 1.7–7)
NEUTROPHILS NFR BLD AUTO: 67.5 % (ref 42.7–76)
NRBC BLD AUTO-RTO: 0 /100 WBC (ref 0–0.2)
PLAT MORPH BLD: NORMAL
PLATELET # BLD AUTO: 387 10*3/MM3 (ref 140–450)
PMV BLD AUTO: 10.5 FL (ref 6–12)
POTASSIUM SERPL-SCNC: 4.2 MMOL/L (ref 3.5–5.2)
PROT SERPL-MCNC: 7.6 G/DL (ref 6–8.5)
RBC # BLD AUTO: 3.54 10*6/MM3 (ref 3.77–5.28)
SODIUM SERPL-SCNC: 139 MMOL/L (ref 136–145)
TIBC SERPL-MCNC: 305 MCG/DL (ref 298–536)
TRANSFERRIN SERPL-MCNC: 205 MG/DL (ref 200–360)
WBC MORPH BLD: NORMAL
WBC NRBC COR # BLD AUTO: 8.51 10*3/MM3 (ref 3.4–10.8)

## 2024-08-16 PROCEDURE — 82728 ASSAY OF FERRITIN: CPT | Performed by: INTERNAL MEDICINE

## 2024-08-16 PROCEDURE — 36415 COLL VENOUS BLD VENIPUNCTURE: CPT

## 2024-08-16 PROCEDURE — 84466 ASSAY OF TRANSFERRIN: CPT | Performed by: INTERNAL MEDICINE

## 2024-08-16 PROCEDURE — 80053 COMPREHEN METABOLIC PANEL: CPT | Performed by: INTERNAL MEDICINE

## 2024-08-16 PROCEDURE — 85025 COMPLETE CBC W/AUTO DIFF WBC: CPT

## 2024-08-16 PROCEDURE — 83540 ASSAY OF IRON: CPT | Performed by: INTERNAL MEDICINE

## 2024-08-16 PROCEDURE — 83615 LACTATE (LD) (LDH) ENZYME: CPT | Performed by: INTERNAL MEDICINE

## 2024-08-16 PROCEDURE — 85007 BL SMEAR W/DIFF WBC COUNT: CPT

## 2024-08-19 ENCOUNTER — SPECIALTY PHARMACY (OUTPATIENT)
Dept: PHARMACY | Facility: HOSPITAL | Age: 69
End: 2024-08-19
Payer: MEDICARE

## 2024-08-19 NOTE — PROGRESS NOTES
Specialty Pharmacy Note: Hydrea (hydroxyurea)    Blanka Mueller is a 69 y.o. female with essential thrombocythemia was seen 8/16/24 by Dr. Bryant. Per provider dictation, no changes to oral oncology regimen Hydrea (hydroxyurea).  Labs Review: The CMP and CBC from 8/16/24 have been reviewed. No dose adjustments are needed for the oral specialty medication(s) based on the labs.    Specialty pharmacy will continue to follow patient.    Jaylin Smith, AlexandraD, BCPS  8/19/2024  09:58 EDT

## 2024-08-21 ENCOUNTER — OFFICE (OUTPATIENT)
Age: 69
End: 2024-08-21
Payer: COMMERCIAL

## 2024-08-21 ENCOUNTER — TELEPHONE (OUTPATIENT)
Dept: ONCOLOGY | Facility: CLINIC | Age: 69
End: 2024-08-21
Payer: MEDICARE

## 2024-08-21 ENCOUNTER — OFFICE (OUTPATIENT)
Dept: URBAN - METROPOLITAN AREA CLINIC 76 | Facility: CLINIC | Age: 69
End: 2024-08-21
Payer: COMMERCIAL

## 2024-08-21 VITALS
DIASTOLIC BLOOD PRESSURE: 82 MMHG | WEIGHT: 159 LBS | DIASTOLIC BLOOD PRESSURE: 82 MMHG | DIASTOLIC BLOOD PRESSURE: 82 MMHG | OXYGEN SATURATION: 99 % | DIASTOLIC BLOOD PRESSURE: 82 MMHG | HEART RATE: 76 BPM | OXYGEN SATURATION: 99 % | HEART RATE: 76 BPM | OXYGEN SATURATION: 99 % | SYSTOLIC BLOOD PRESSURE: 142 MMHG | DIASTOLIC BLOOD PRESSURE: 82 MMHG | HEIGHT: 68 IN | SYSTOLIC BLOOD PRESSURE: 142 MMHG | HEART RATE: 76 BPM | HEIGHT: 68 IN | HEIGHT: 68 IN | WEIGHT: 159 LBS | OXYGEN SATURATION: 99 % | SYSTOLIC BLOOD PRESSURE: 142 MMHG | DIASTOLIC BLOOD PRESSURE: 82 MMHG | SYSTOLIC BLOOD PRESSURE: 142 MMHG | HEIGHT: 68 IN | HEIGHT: 68 IN | WEIGHT: 159 LBS | HEART RATE: 76 BPM | WEIGHT: 159 LBS | HEIGHT: 68 IN | HEIGHT: 68 IN | DIASTOLIC BLOOD PRESSURE: 82 MMHG | OXYGEN SATURATION: 99 % | HEART RATE: 76 BPM | SYSTOLIC BLOOD PRESSURE: 142 MMHG | SYSTOLIC BLOOD PRESSURE: 142 MMHG | HEART RATE: 76 BPM | WEIGHT: 159 LBS | OXYGEN SATURATION: 99 % | OXYGEN SATURATION: 99 % | WEIGHT: 159 LBS | WEIGHT: 159 LBS | SYSTOLIC BLOOD PRESSURE: 142 MMHG | HEART RATE: 76 BPM

## 2024-08-21 DIAGNOSIS — R74.02 ELEVATION OF LEVELS OF LACTIC ACID DEHYDROGENASE [LDH]: ICD-10-CM

## 2024-08-21 DIAGNOSIS — I81 PORTAL VEIN THROMBOSIS: ICD-10-CM

## 2024-08-21 DIAGNOSIS — Z86.010 PERSONAL HISTORY OF COLON POLYPS: ICD-10-CM

## 2024-08-21 DIAGNOSIS — K64.9 UNSPECIFIED HEMORRHOIDS: ICD-10-CM

## 2024-08-21 DIAGNOSIS — I85.00 ESOPHAGEAL VARICES WITHOUT BLEEDING: ICD-10-CM

## 2024-08-21 DIAGNOSIS — D75.839 THROMBOCYTOSIS: Primary | ICD-10-CM

## 2024-08-21 PROCEDURE — 99214 OFFICE O/P EST MOD 30 MIN: CPT | Performed by: INTERNAL MEDICINE

## 2024-08-21 NOTE — TELEPHONE ENCOUNTER
"  Caller: Blanka Mueller \"Anne Marie Holder\"    Relationship: Self    Best call back number: [phone]      Who are you requesting to speak with (clinical staff, provider,  specific staff member): CLINICAL    What was the call regarding: PATIENT GASTRO PROVIDER MD. GARCIA REQUESTING TO ADD [facility] LABS TO HER MONTHLY ORDERS    PATIENT ALSO STATES THAT WHILE AST IS ELEVATED MD GARCIA DID NOT APPEAR CONCERNED  "

## 2024-08-22 ENCOUNTER — DOCUMENTATION (OUTPATIENT)
Dept: ONCOLOGY | Facility: CLINIC | Age: 69
End: 2024-08-22
Payer: MEDICARE

## 2024-08-22 ENCOUNTER — OFFICE VISIT (OUTPATIENT)
Dept: ORTHOPEDIC SURGERY | Facility: CLINIC | Age: 69
End: 2024-08-22
Payer: MEDICARE

## 2024-08-22 VITALS — BODY MASS INDEX: 24.13 KG/M2 | HEIGHT: 68 IN | TEMPERATURE: 97 F | WEIGHT: 159.2 LBS

## 2024-08-22 DIAGNOSIS — M17.11 PRIMARY OSTEOARTHRITIS OF RIGHT KNEE: Primary | ICD-10-CM

## 2024-08-22 RX ORDER — LIDOCAINE HYDROCHLORIDE 10 MG/ML
5 INJECTION, SOLUTION EPIDURAL; INFILTRATION; INTRACAUDAL; PERINEURAL
Status: COMPLETED | OUTPATIENT
Start: 2024-08-22 | End: 2024-08-22

## 2024-08-22 RX ORDER — METHYLPREDNISOLONE ACETATE 80 MG/ML
80 INJECTION, SUSPENSION INTRA-ARTICULAR; INTRALESIONAL; INTRAMUSCULAR; SOFT TISSUE
Status: COMPLETED | OUTPATIENT
Start: 2024-08-22 | End: 2024-08-22

## 2024-08-22 RX ADMIN — METHYLPREDNISOLONE ACETATE 80 MG: 80 INJECTION, SUSPENSION INTRA-ARTICULAR; INTRALESIONAL; INTRAMUSCULAR; SOFT TISSUE at 12:59

## 2024-08-22 RX ADMIN — LIDOCAINE HYDROCHLORIDE 5 ML: 10 INJECTION, SOLUTION EPIDURAL; INFILTRATION; INTRACAUDAL; PERINEURAL at 12:59

## 2024-08-22 NOTE — PROGRESS NOTES
I saw her on 8/16/2024 for the first time.  She was previously seeing Dr. Singh who has retired.  Dr. Singh had been ordering LDH testing.  I do not typically order this test for her medical issues.  However, on 8/16/2024 the LDH had elevated.  She saw her GI physician, Dr. Ky Erwin and he recommended getting this checked again at her next lab draw.  Patient called in to tell us this but also told us she did not really want to have it done at her next lab drawl to reduce cost.  Although I do not usually order this test for her medical problems since it was done and significantly elevated, I have recommended doing this test at her next lab draw in our office.

## 2024-08-22 NOTE — PROGRESS NOTES
8/22/2024    Blanka Mueller is here today for worsening knee pain. Pt has undergone injection of the knee in the past with good resolution of symptoms. Pt is requesting a repeat injection.     KNEE Injection Procedure Note:    Large Joint Arthrocentesis: R knee  Date/Time: 8/22/2024 12:59 PM  Consent given by: patient  Site marked: site marked  Timeout: Immediately prior to procedure a time out was called to verify the correct patient, procedure, equipment, support staff and site/side marked as required   Supporting Documentation  Indications: pain and joint swelling   Procedure Details  Location: knee - R knee  Preparation: Patient was prepped and draped in the usual sterile fashion  Needle size: 22 G  Approach: anterolateral  Medications administered: 80 mg methylPREDNISolone acetate 80 MG/ML; 5 mL lidocaine PF 1% 1 %  Patient tolerance: patient tolerated the procedure well with no immediate complications      (3 mL of lidocaine was used for anesthetic purposes)    At the conclusion of the injection I discussed the importance of continued quad strengthening exercises on a daily basis. I will see the patient back if the symptoms should fail to improve or worsen.    Arturo Olivo, APRN  8/22/2024

## 2024-08-22 NOTE — TELEPHONE ENCOUNTER
Called the patient back. Dr. Erwin brought the elevated LDH to her attention of 525 and she wanted to see what Dr. Bryant thought about redrawing this lab. I spoke with him and he stated that we could redraw the level in October with her labs and check the status of the result at that time. Patient was appreciative and v/u.

## 2024-09-05 ENCOUNTER — TELEPHONE (OUTPATIENT)
Dept: ORTHOPEDIC SURGERY | Facility: CLINIC | Age: 69
End: 2024-09-05

## 2024-09-05 DIAGNOSIS — M17.11 PRIMARY OSTEOARTHRITIS OF RIGHT KNEE: Primary | ICD-10-CM

## 2024-09-05 NOTE — TELEPHONE ENCOUNTER
"Caller: Blanka Mueller \"Anne Marie Holder\"    Relationship to patient: Self    Best call back number: [phone]    Type of visit: R KNEE GEL INJECTION    Requested date: ASAP     Additional notes:PT CALLING TO REQUEST A GEL INJECTION IN HER R KNEE. PT STATES THE GERONIMO INJ SHE RECEIVED ON 08/22/24 HAS NOT HELPED MUCH AT ALL. PLEASE CONTACT PT TO DISCUSS.         "

## 2024-09-05 NOTE — TELEPHONE ENCOUNTER
Patient will need to wait 3 months before injection.  I have also went ahead and put orders in so we can get it approved by insurance.

## 2024-10-08 ENCOUNTER — TELEPHONE (OUTPATIENT)
Dept: ONCOLOGY | Facility: CLINIC | Age: 69
End: 2024-10-08

## 2024-10-08 NOTE — TELEPHONE ENCOUNTER
"  Caller: Blanka Mueller \"Anne Marie Holder\"    Relationship to patient: Self    Best call back number: [phone]    Type of visit: LAB AND RN REVIEW    Requested date: TODAY OR TOMORROW ANY TIME OR THURSDAY AFTER 1 PM    If rescheduling, when is the original appointment: 10/11  "

## 2024-10-10 ENCOUNTER — CLINICAL SUPPORT (OUTPATIENT)
Dept: ONCOLOGY | Facility: HOSPITAL | Age: 69
End: 2024-10-10
Payer: MEDICARE

## 2024-10-10 ENCOUNTER — LAB (OUTPATIENT)
Dept: OTHER | Facility: HOSPITAL | Age: 69
End: 2024-10-10
Payer: MEDICARE

## 2024-10-10 DIAGNOSIS — D75.839 THROMBOCYTOSIS: ICD-10-CM

## 2024-10-10 LAB
BASOPHILS # BLD AUTO: 0.06 10*3/MM3 (ref 0–0.2)
BASOPHILS NFR BLD AUTO: 0.8 % (ref 0–1.5)
DEPRECATED RDW RBC AUTO: 55.8 FL (ref 37–54)
EOSINOPHIL # BLD AUTO: 0.31 10*3/MM3 (ref 0–0.4)
EOSINOPHIL NFR BLD AUTO: 4.3 % (ref 0.3–6.2)
ERYTHROCYTE [DISTWIDTH] IN BLOOD BY AUTOMATED COUNT: 14.5 % (ref 12.3–15.4)
HCT VFR BLD AUTO: 33.7 % (ref 34–46.6)
HGB BLD-MCNC: 11.1 G/DL (ref 12–15.9)
IMM GRANULOCYTES # BLD AUTO: 0.07 10*3/MM3 (ref 0–0.05)
IMM GRANULOCYTES NFR BLD AUTO: 1 % (ref 0–0.5)
LDH SERPL-CCNC: 265 U/L (ref 135–214)
LYMPHOCYTES # BLD AUTO: 1.53 10*3/MM3 (ref 0.7–3.1)
LYMPHOCYTES NFR BLD AUTO: 21.1 % (ref 19.6–45.3)
MCH RBC QN AUTO: 34.3 PG (ref 26.6–33)
MCHC RBC AUTO-ENTMCNC: 32.9 G/DL (ref 31.5–35.7)
MCV RBC AUTO: 104 FL (ref 79–97)
MONOCYTES # BLD AUTO: 0.68 10*3/MM3 (ref 0.1–0.9)
MONOCYTES NFR BLD AUTO: 9.4 % (ref 5–12)
NEUTROPHILS NFR BLD AUTO: 4.61 10*3/MM3 (ref 1.7–7)
NEUTROPHILS NFR BLD AUTO: 63.4 % (ref 42.7–76)
NRBC BLD AUTO-RTO: 0 /100 WBC (ref 0–0.2)
PLATELET # BLD AUTO: 349 10*3/MM3 (ref 140–450)
PMV BLD AUTO: 10 FL (ref 6–12)
RBC # BLD AUTO: 3.24 10*6/MM3 (ref 3.77–5.28)
WBC NRBC COR # BLD AUTO: 7.26 10*3/MM3 (ref 3.4–10.8)

## 2024-10-10 PROCEDURE — 85025 COMPLETE CBC W/AUTO DIFF WBC: CPT | Performed by: INTERNAL MEDICINE

## 2024-10-10 PROCEDURE — 36415 COLL VENOUS BLD VENIPUNCTURE: CPT

## 2024-10-10 PROCEDURE — 83615 LACTATE (LD) (LDH) ENZYME: CPT | Performed by: INTERNAL MEDICINE

## 2024-10-10 NOTE — PROGRESS NOTES
Balnka Mueller is a 69 y.o. female with Thrombocytosis, Thrombophilia, Positive JAK2 mutation seen by Hematology/Oncology provider, Dr. Singh, and is scheduled with Clinical Review offered by [facility]. Patient's visit was held via telephone today.     Therapy plan  Hydroxyurea    Current Medication List  Medication Sig Start Date End Date Taking? Authorizing Provider   ascorbic acid (VITAMIN C) 500 MG tablet Take 2 tablets by mouth Daily. PT HOLDING FOR SURGERY    Kala Be MD   calcium carbonate (TUMS) 500 MG chewable tablet Chew 1 tablet Daily.  Patient not taking: Reported on 2/27/2024    Kala Be MD   Cholecalciferol (Vitamin D3) 50 MCG (2000 UT) capsule Take 1 capsule by mouth Daily.    Kala Be MD   Eliquis 5 MG tablet tablet Take 1 tablet by mouth Every 12 (Twelve) Hours. 3/16/23   Kala Be MD   Glucosamine-Chondroitin-MSM 3967-6121-917 MG pack Take  by mouth.    Kala Be MD   hydroxyurea (HYDREA) 500 MG capsule Take 1 capsule ( 500 mg) by mouth  daily for 2 days, then take 2 caps ( 1000 mg) every third day. 2/9/24   Marc Singh MD   hydrOXYzine (ATARAX) 25 MG tablet Take 1-2 tablets by mouth As Needed. 3/28/23   Kala Be MD   levothyroxine (SYNTHROID, LEVOTHROID) 50 MCG tablet Take 1 tablet by mouth Daily. 12/8/22   Kala Be MD   Magnesium Oxide 400 (240 Mg) MG tablet Take 1 tablet by mouth Daily.    Kala Be MD   melatonin 3 MG tablet Take 1 tablet by mouth At Night As Needed for Sleep. 1/23/23   Nabil Osuna MD   multivitamin (THERAGRAN) tablet tablet Take 1 tablet by mouth Daily. PT HOLDING FOR SURGERY    Kala Be MD   nadolol (CORGARD) 20 MG tablet Take 1.5 tablets by mouth Every Morning.    Kala Be MD   Probiotic Product (PROBIOTIC DAILY PO) Take 1 capsule by mouth Every Night.    Kala Be MD   triamcinolone (KENALOG)  0.1 % cream Apply 1 application  topically to the appropriate area as directed As Needed. 9/1/23 8/31/24  ProviderKala MD   Zinc 100 MG tablet Take 200 tablet/day by mouth.    Provider, MD Kala       Relevant Laboratory Values  Lab Results   Component Value Date    WBC 7.26 10/10/2024    RBC 3.24 (L) 10/10/2024    HGB 11.1 (L) 10/10/2024    HCT 33.7 (L) 10/10/2024    .0 (H) 10/10/2024    MCH 34.3 (H) 10/10/2024    MCHC 32.9 10/10/2024    RDW 14.5 10/10/2024    RDWSD 55.8 (H) 10/10/2024    MPV 10.0 10/10/2024     10/10/2024    NEUTRORELPCT 63.4 10/10/2024    LYMPHORELPCT 21.1 10/10/2024    MONORELPCT 9.4 10/10/2024    EOSRELPCT 4.3 10/10/2024    BASORELPCT 0.8 10/10/2024    AUTOIGPER 1.0 (H) 10/10/2024    NEUTROABS 4.61 10/10/2024    LYMPHSABS 1.53 10/10/2024    MONOSABS 0.68 10/10/2024    EOSABS 0.31 10/10/2024    BASOSABS 0.06 10/10/2024    AUTOIGNUM 0.07 (H) 10/10/2024    NRBC 0.0 10/10/2024       Clinical Review  Patient reports adherence to Hydrea and alternates between 500 mg for two days and then 1000 mg on the third day (every third day patient takes 1000 mg Hydrea). Patient takes 2 tablets Tuesday/Friday and 1 tablet all other days. Patient also reports adherence to Eliquis. Patient denies any signs/symptoms of bruising or bleeding.     Platelets 359 from 378 (8/16/24) from 413 (4/5/24) from 356 (2/9/24) from 404 (1/12/24)    Hemoglobin is 11.1 from 12.4 (8/16/24) from 11.9 (6/7/24)     from 525 (8/16/24); no need to continue following at this time per Dr. Bryant    The patient's current therapy plan and above labs have been reviewed with Dr. Bryant.     Plan  CBC reviewed, results are stable for this patient at this time. - see above in Laboratory Results  Will instruct Blanka Mueller to continue Hydrea at current dosage (takes 500 mg for two days and 1000 mg every third day).  Follow up in two months. Next scheduled appointment(s) reviewed with patient.  Patient is  instructed to call the office with any concerns or new symptoms prior to next visit.  Verbal information provided. Patient expresses understanding and has no further questions at this time.            Sinai Scott, PharmD  Clinical Pharmacy Services   [facility]  10/10/2024  14:21 EDT

## 2024-11-13 RX ORDER — HYDROXYUREA 500 MG/1
CAPSULE ORAL
Qty: 120 CAPSULE | Refills: 1 | Status: SHIPPED | OUTPATIENT
Start: 2024-11-13

## 2024-11-21 ENCOUNTER — SPECIALTY PHARMACY (OUTPATIENT)
Dept: PHARMACY | Facility: HOSPITAL | Age: 69
End: 2024-11-21
Payer: MEDICARE

## 2024-11-26 ENCOUNTER — OFFICE VISIT (OUTPATIENT)
Dept: ORTHOPEDIC SURGERY | Facility: CLINIC | Age: 69
End: 2024-11-26
Payer: MEDICARE

## 2024-11-26 VITALS — TEMPERATURE: 97.5 F | WEIGHT: 159.2 LBS | BODY MASS INDEX: 24.13 KG/M2 | HEIGHT: 68 IN

## 2024-11-26 DIAGNOSIS — M17.11 PRIMARY OSTEOARTHRITIS OF RIGHT KNEE: ICD-10-CM

## 2024-11-26 RX ORDER — LIDOCAINE HYDROCHLORIDE 10 MG/ML
5 INJECTION, SOLUTION EPIDURAL; INFILTRATION; INTRACAUDAL; PERINEURAL
Status: COMPLETED | OUTPATIENT
Start: 2024-11-26 | End: 2024-11-26

## 2024-11-26 RX ORDER — LEVOTHYROXINE SODIUM 75 UG/1
75 TABLET ORAL DAILY
COMMUNITY
Start: 2024-09-04

## 2024-11-26 RX ADMIN — LIDOCAINE HYDROCHLORIDE 5 ML: 10 INJECTION, SOLUTION EPIDURAL; INFILTRATION; INTRACAUDAL; PERINEURAL at 11:19

## 2024-11-26 NOTE — PROGRESS NOTES
11/26/2024    Blanka Mueller is here today for worsening knee pain. Pt has undergone injection of the knee in the past with good resolution of symptoms. Pt is requesting a repeat injection.     KNEE Injection Procedure Note:    Large Joint Arthrocentesis: R knee  Date/Time: 11/26/2024 11:19 AM  Consent given by: patient  Site marked: site marked  Timeout: Immediately prior to procedure a time out was called to verify the correct patient, procedure, equipment, support staff and site/side marked as required   Supporting Documentation  Indications: pain and joint swelling   Procedure Details  Location: knee - R knee  Preparation: Patient was prepped and draped in the usual sterile fashion  Needle size: 22 G  Approach: anterolateral  Medications administered: 5 mL lidocaine PF 1% 1 %; 32 mg Triamcinolone Acetonide 32 MG  Patient tolerance: patient tolerated the procedure well with no immediate complications    (3 mL of lidocaine was used for anesthetic purposes)    At the conclusion of the injection I discussed the importance of continued quad strengthening exercises on a daily basis. OA web  medial/lateral was fit for stability during ambulation to offload compartment pressure. I will see the patient back if the symptoms should fail to improve or worsen.    Arturo Olivo, APRN  11/26/2024

## 2024-12-06 ENCOUNTER — LAB (OUTPATIENT)
Dept: OTHER | Facility: HOSPITAL | Age: 69
End: 2024-12-06
Payer: MEDICARE

## 2024-12-06 ENCOUNTER — CLINICAL SUPPORT (OUTPATIENT)
Dept: ONCOLOGY | Facility: HOSPITAL | Age: 69
End: 2024-12-06
Payer: MEDICARE

## 2024-12-06 DIAGNOSIS — D75.839 THROMBOCYTOSIS: ICD-10-CM

## 2024-12-06 LAB
BASOPHILS # BLD AUTO: 0.09 10*3/MM3 (ref 0–0.2)
BASOPHILS NFR BLD AUTO: 0.9 % (ref 0–1.5)
DEPRECATED RDW RBC AUTO: 49.2 FL (ref 37–54)
EOSINOPHIL # BLD AUTO: 0.3 10*3/MM3 (ref 0–0.4)
EOSINOPHIL NFR BLD AUTO: 3 % (ref 0.3–6.2)
ERYTHROCYTE [DISTWIDTH] IN BLOOD BY AUTOMATED COUNT: 13.7 % (ref 12.3–15.4)
HCT VFR BLD AUTO: 37.2 % (ref 34–46.6)
HGB BLD-MCNC: 12.8 G/DL (ref 12–15.9)
IMM GRANULOCYTES # BLD AUTO: 0.11 10*3/MM3 (ref 0–0.05)
IMM GRANULOCYTES NFR BLD AUTO: 1.1 % (ref 0–0.5)
LYMPHOCYTES # BLD AUTO: 1.89 10*3/MM3 (ref 0.7–3.1)
LYMPHOCYTES NFR BLD AUTO: 18.7 % (ref 19.6–45.3)
MCH RBC QN AUTO: 34.1 PG (ref 26.6–33)
MCHC RBC AUTO-ENTMCNC: 34.4 G/DL (ref 31.5–35.7)
MCV RBC AUTO: 99.2 FL (ref 79–97)
MONOCYTES # BLD AUTO: 1.08 10*3/MM3 (ref 0.1–0.9)
MONOCYTES NFR BLD AUTO: 10.7 % (ref 5–12)
NEUTROPHILS NFR BLD AUTO: 6.63 10*3/MM3 (ref 1.7–7)
NEUTROPHILS NFR BLD AUTO: 65.6 % (ref 42.7–76)
NRBC BLD AUTO-RTO: 0 /100 WBC (ref 0–0.2)
PLATELET # BLD AUTO: 426 10*3/MM3 (ref 140–450)
PMV BLD AUTO: 10.4 FL (ref 6–12)
RBC # BLD AUTO: 3.75 10*6/MM3 (ref 3.77–5.28)
WBC NRBC COR # BLD AUTO: 10.1 10*3/MM3 (ref 3.4–10.8)

## 2024-12-06 PROCEDURE — 85025 COMPLETE CBC W/AUTO DIFF WBC: CPT | Performed by: INTERNAL MEDICINE

## 2024-12-06 NOTE — PROGRESS NOTES
Blanka Mueller is a 69 y.o. female with Thrombocytosis, Thrombophilia, Positive JAK2 mutation seen by Hematology/Oncology provider, Dr. Singh, and is scheduled with Clinical Review offered by [facility]. Patient's visit was held via telephone today.     Therapy plan  Hydroxyurea    Current Medication List  Medication Sig Start Date End Date Taking? Authorizing Provider   ascorbic acid (VITAMIN C) 500 MG tablet Take 2 tablets by mouth Daily. PT HOLDING FOR SURGERY    Kala Be MD   calcium carbonate (TUMS) 500 MG chewable tablet Chew 1 tablet Daily.  Patient not taking: Reported on 2/27/2024    Kala Be MD   Cholecalciferol (Vitamin D3) 50 MCG (2000 UT) capsule Take 1 capsule by mouth Daily.    Kala Be MD   Eliquis 5 MG tablet tablet Take 1 tablet by mouth Every 12 (Twelve) Hours. 3/16/23   Kala Be MD   Glucosamine-Chondroitin-MSM 6569-6797-657 MG pack Take  by mouth.    Kala Be MD   hydroxyurea (HYDREA) 500 MG capsule Take 1 capsule ( 500 mg) by mouth  daily for 2 days, then take 2 caps ( 1000 mg) every third day. 2/9/24   Marc Singh MD   hydrOXYzine (ATARAX) 25 MG tablet Take 1-2 tablets by mouth As Needed. 3/28/23   Kala Be MD   levothyroxine (SYNTHROID, LEVOTHROID) 50 MCG tablet Take 1 tablet by mouth Daily. 12/8/22   Kala Be MD   Magnesium Oxide 400 (240 Mg) MG tablet Take 1 tablet by mouth Daily.    Kala Be MD   melatonin 3 MG tablet Take 1 tablet by mouth At Night As Needed for Sleep. 1/23/23   Nabil Osuna MD   multivitamin (THERAGRAN) tablet tablet Take 1 tablet by mouth Daily. PT HOLDING FOR SURGERY    Kala Be MD   nadolol (CORGARD) 20 MG tablet Take 1.5 tablets by mouth Every Morning.    Kala Be MD   Probiotic Product (PROBIOTIC DAILY PO) Take 1 capsule by mouth Every Night.    Kala Be MD   triamcinolone (KENALOG)  0.1 % cream Apply 1 application  topically to the appropriate area as directed As Needed. 9/1/23 8/31/24  Provider, MD Kala   Zinc 100 MG tablet Take 200 tablet/day by mouth.    Provider, MD Kala       Relevant Laboratory Values  Lab Results   Component Value Date    WBC 10.10 12/06/2024    RBC 3.75 (L) 12/06/2024    HGB 12.8 12/06/2024    HCT 37.2 12/06/2024    MCV 99.2 (H) 12/06/2024    MCH 34.1 (H) 12/06/2024    MCHC 34.4 12/06/2024    RDW 13.7 12/06/2024    RDWSD 49.2 12/06/2024    MPV 10.4 12/06/2024     12/06/2024    NEUTRORELPCT 65.6 12/06/2024    LYMPHORELPCT 18.7 (L) 12/06/2024    MONORELPCT 10.7 12/06/2024    EOSRELPCT 3.0 12/06/2024    BASORELPCT 0.9 12/06/2024    AUTOIGPER 1.1 (H) 12/06/2024    NEUTROABS 6.63 12/06/2024    LYMPHSABS 1.89 12/06/2024    MONOSABS 1.08 (H) 12/06/2024    EOSABS 0.30 12/06/2024    BASOSABS 0.09 12/06/2024    AUTOIGNUM 0.11 (H) 12/06/2024    NRBC 0.0 12/06/2024       Clinical Review  Patient reports adherence to Hydrea and alternates between 500 mg for two days and then 1000 mg on the third day (every third day patient takes 1000 mg Hydrea). Patient also reports adherence to Eliquis. Patient denies any signs/symptoms of bruising or bleeding.     Platelets 426    Hemoglobin 12.8    The patient's current therapy plan and above labs have been reviewed.     Plan  CBC reviewed, results are stable for this patient at this time. - see above in Laboratory Results  Will instruct Blanka LIVINGSTON [place] to continue Hydrea at current dosage (takes 500 mg for two days and 1000 mg every third day).  Follow up in two months. Next scheduled appointment(s) reviewed with patient.  Patient is instructed to call the office with any concerns or new symptoms prior to next visit.  Verbal information provided. Patient expresses understanding and has no further questions at this time.            Sinai Scott, PharmD  Clinical Pharmacy Services   [facility]  12/6/2024  12:58 EST

## 2025-01-30 ENCOUNTER — CLINICAL SUPPORT (OUTPATIENT)
Dept: ONCOLOGY | Facility: HOSPITAL | Age: 70
End: 2025-01-30
Payer: MEDICARE

## 2025-01-30 ENCOUNTER — LAB (OUTPATIENT)
Dept: OTHER | Facility: HOSPITAL | Age: 70
End: 2025-01-30
Payer: MEDICARE

## 2025-01-30 DIAGNOSIS — D75.839 THROMBOCYTOSIS: ICD-10-CM

## 2025-01-30 LAB
BASOPHILS # BLD AUTO: 0.11 10*3/MM3 (ref 0–0.2)
BASOPHILS NFR BLD AUTO: 1.5 % (ref 0–1.5)
DEPRECATED RDW RBC AUTO: 52.4 FL (ref 37–54)
EOSINOPHIL # BLD AUTO: 0.44 10*3/MM3 (ref 0–0.4)
EOSINOPHIL NFR BLD AUTO: 5.9 % (ref 0.3–6.2)
ERYTHROCYTE [DISTWIDTH] IN BLOOD BY AUTOMATED COUNT: 14.3 % (ref 12.3–15.4)
HCT VFR BLD AUTO: 38.5 % (ref 34–46.6)
HGB BLD-MCNC: 13.2 G/DL (ref 12–15.9)
IMM GRANULOCYTES # BLD AUTO: 0.04 10*3/MM3 (ref 0–0.05)
IMM GRANULOCYTES NFR BLD AUTO: 0.5 % (ref 0–0.5)
LYMPHOCYTES # BLD AUTO: 1.45 10*3/MM3 (ref 0.7–3.1)
LYMPHOCYTES NFR BLD AUTO: 19.4 % (ref 19.6–45.3)
MCH RBC QN AUTO: 34.6 PG (ref 26.6–33)
MCHC RBC AUTO-ENTMCNC: 34.3 G/DL (ref 31.5–35.7)
MCV RBC AUTO: 101 FL (ref 79–97)
MONOCYTES # BLD AUTO: 0.64 10*3/MM3 (ref 0.1–0.9)
MONOCYTES NFR BLD AUTO: 8.5 % (ref 5–12)
NEUTROPHILS NFR BLD AUTO: 4.81 10*3/MM3 (ref 1.7–7)
NEUTROPHILS NFR BLD AUTO: 64.2 % (ref 42.7–76)
NRBC BLD AUTO-RTO: 0 /100 WBC (ref 0–0.2)
PLATELET # BLD AUTO: 351 10*3/MM3 (ref 140–450)
PMV BLD AUTO: 10.6 FL (ref 6–12)
RBC # BLD AUTO: 3.81 10*6/MM3 (ref 3.77–5.28)
WBC NRBC COR # BLD AUTO: 7.49 10*3/MM3 (ref 3.4–10.8)

## 2025-01-30 PROCEDURE — 85025 COMPLETE CBC W/AUTO DIFF WBC: CPT | Performed by: INTERNAL MEDICINE

## 2025-01-30 NOTE — PROGRESS NOTES
Blanka Mueller is a 69 y.o. female with Thrombocytosis, Thrombophilia, Positive JAK2 mutation seen by Hematology/Oncology provider, Dr. Bryant, and is scheduled with Clinical Review offered by [facility]. Patient's visit was held via telephone today.     Therapy plan  Hydroxyurea    Current Medication List  Medication Sig Start Date End Date Taking? Authorizing Provider   ascorbic acid (VITAMIN C) 500 MG tablet Take 2 tablets by mouth Daily. PT HOLDING FOR SURGERY    Kala Be MD   calcium carbonate (TUMS) 500 MG chewable tablet Chew 1 tablet Daily.  Patient not taking: Reported on 2/27/2024    Kala Be MD   Cholecalciferol (Vitamin D3) 50 MCG (2000 UT) capsule Take 1 capsule by mouth Daily.    Kala Be MD   Eliquis 5 MG tablet tablet Take 1 tablet by mouth Every 12 (Twelve) Hours. 3/16/23   Kala Be MD   Glucosamine-Chondroitin-MSM [id number] MG pack Take  by mouth.    Kala Be MD   hydroxyurea (HYDREA) 500 MG capsule Take 1 capsule ( 500 mg) by mouth  daily for 2 days, then take 2 caps ( 1000 mg) every third day. 2/9/24   Marc Singh MD   hydrOXYzine (ATARAX) 25 MG tablet Take 1-2 tablets by mouth As Needed. 3/28/23   Kala Be MD   levothyroxine (SYNTHROID, LEVOTHROID) 50 MCG tablet Take 1 tablet by mouth Daily. 12/8/22   Kala Be MD   Magnesium Oxide 400 (240 Mg) MG tablet Take 1 tablet by mouth Daily.    Kala Be MD   melatonin 3 MG tablet Take 1 tablet by mouth At Night As Needed for Sleep. 1/23/23   Nabil Osuna MD   multivitamin (THERAGRAN) tablet tablet Take 1 tablet by mouth Daily. PT HOLDING FOR SURGERY    Kala Be MD   nadolol (CORGARD) 20 MG tablet Take 1.5 tablets by mouth Every Morning.    Kala Be MD   Probiotic Product (PROBIOTIC DAILY PO) Take 1 capsule by mouth Every Night.    Kala Be MD   triamcinolone (KENALOG)  0.1 % cream Apply 1 application  topically to the appropriate area as directed As Needed. 9/1/23 8/31/24  Provider, MD Kala   Zinc 100 MG tablet Take 200 tablet/day by mouth.    Provider, MD Kala       Relevant Laboratory Values  Lab Results   Component Value Date    WBC 7.49 01/30/2025    RBC 3.81 01/30/2025    HGB 13.2 01/30/2025    HCT 38.5 01/30/2025    .0 (H) 01/30/2025    MCH 34.6 (H) 01/30/2025    MCHC 34.3 01/30/2025    RDW 14.3 01/30/2025    RDWSD 52.4 01/30/2025    MPV 10.6 01/30/2025     01/30/2025    NEUTRORELPCT 64.2 01/30/2025    LYMPHORELPCT 19.4 (L) 01/30/2025    MONORELPCT 8.5 01/30/2025    EOSRELPCT 5.9 01/30/2025    BASORELPCT 1.5 01/30/2025    AUTOIGPER 0.5 01/30/2025    NEUTROABS 4.81 01/30/2025    LYMPHSABS 1.45 01/30/2025    MONOSABS 0.64 01/30/2025    EOSABS 0.44 (H) 01/30/2025    BASOSABS 0.11 01/30/2025    AUTOIGNUM 0.04 01/30/2025    NRBC 0.0 01/30/2025       Clinical Review  Patient reports adherence to Hydrea and alternates between 500 mg for two days and then 1000 mg on the third day (every third day patient takes 1000 mg Hydrea). Patient also reports adherence to Eliquis. Patient denies any signs/symptoms of bruising or bleeding.     Platelets 351   Hemoglobin 13.2    The patient's current therapy plan and above labs have been reviewed.     Plan  CBC reviewed, results are stable for this patient at this time. - see above in Laboratory Results  Will instruct Blanka LIVINGSTON [place] to continue Eliquis 5 mg twice daily and continue Hydrea at current dosage (takes 1000 mg on Monday and Thursday and 500 mg on all other days).  Follow up in two months. Next scheduled appointment(s) reviewed with patient.  Patient is instructed to call the office with any concerns or new symptoms prior to next visit.  Verbal information provided. Patient expresses understanding and has no further questions at this time.            Sinai Scott, PharmD  Clinical Pharmacy Services    [facility]  1/30/2025  16:21 EST

## 2025-02-25 ENCOUNTER — LAB (OUTPATIENT)
Dept: OTHER | Facility: HOSPITAL | Age: 70
End: 2025-02-25
Payer: MEDICARE

## 2025-02-25 DIAGNOSIS — D75.839 THROMBOCYTOSIS: ICD-10-CM

## 2025-02-25 DIAGNOSIS — D50.0 IRON DEFICIENCY ANEMIA DUE TO CHRONIC BLOOD LOSS: ICD-10-CM

## 2025-02-25 LAB
BASOPHILS # BLD AUTO: 0.06 10*3/MM3 (ref 0–0.2)
BASOPHILS NFR BLD AUTO: 1 % (ref 0–1.5)
DEPRECATED RDW RBC AUTO: 51.5 FL (ref 37–54)
EOSINOPHIL # BLD AUTO: 0.25 10*3/MM3 (ref 0–0.4)
EOSINOPHIL NFR BLD AUTO: 4.3 % (ref 0.3–6.2)
ERYTHROCYTE [DISTWIDTH] IN BLOOD BY AUTOMATED COUNT: 13.8 % (ref 12.3–15.4)
FERRITIN SERPL-MCNC: 140.5 NG/ML (ref 13–150)
HCT VFR BLD AUTO: 36.4 % (ref 34–46.6)
HGB BLD-MCNC: 12.2 G/DL (ref 12–15.9)
HGB RETIC QN AUTO: 36.3 PG (ref 29.8–36.1)
IMM GRANULOCYTES # BLD AUTO: 0.05 10*3/MM3 (ref 0–0.05)
IMM GRANULOCYTES NFR BLD AUTO: 0.9 % (ref 0–0.5)
IMM RETICS NFR: 11.4 % (ref 3–15.8)
IRON 24H UR-MRATE: 94 MCG/DL (ref 37–145)
IRON SATN MFR SERPL: 30 % (ref 20–50)
LYMPHOCYTES # BLD AUTO: 1.25 10*3/MM3 (ref 0.7–3.1)
LYMPHOCYTES NFR BLD AUTO: 21.4 % (ref 19.6–45.3)
MCH RBC QN AUTO: 34.2 PG (ref 26.6–33)
MCHC RBC AUTO-ENTMCNC: 33.5 G/DL (ref 31.5–35.7)
MCV RBC AUTO: 102 FL (ref 79–97)
MONOCYTES # BLD AUTO: 0.44 10*3/MM3 (ref 0.1–0.9)
MONOCYTES NFR BLD AUTO: 7.5 % (ref 5–12)
NEUTROPHILS NFR BLD AUTO: 3.8 10*3/MM3 (ref 1.7–7)
NEUTROPHILS NFR BLD AUTO: 64.9 % (ref 42.7–76)
NRBC BLD AUTO-RTO: 0 /100 WBC (ref 0–0.2)
PLATELET # BLD AUTO: 361 10*3/MM3 (ref 140–450)
PMV BLD AUTO: 10.2 FL (ref 6–12)
RBC # BLD AUTO: 3.57 10*6/MM3 (ref 3.77–5.28)
RETICS # AUTO: 0.04 10*6/MM3 (ref 0.02–0.13)
RETICS/RBC NFR AUTO: 1.17 % (ref 0.7–1.9)
TIBC SERPL-MCNC: 317 MCG/DL (ref 298–536)
TRANSFERRIN SERPL-MCNC: 213 MG/DL (ref 200–360)
WBC NRBC COR # BLD AUTO: 5.85 10*3/MM3 (ref 3.4–10.8)

## 2025-02-25 PROCEDURE — 83540 ASSAY OF IRON: CPT | Performed by: INTERNAL MEDICINE

## 2025-02-25 PROCEDURE — 82728 ASSAY OF FERRITIN: CPT | Performed by: INTERNAL MEDICINE

## 2025-02-25 PROCEDURE — 84466 ASSAY OF TRANSFERRIN: CPT | Performed by: INTERNAL MEDICINE

## 2025-02-25 PROCEDURE — 85025 COMPLETE CBC W/AUTO DIFF WBC: CPT | Performed by: INTERNAL MEDICINE

## 2025-02-25 PROCEDURE — 36415 COLL VENOUS BLD VENIPUNCTURE: CPT

## 2025-02-25 PROCEDURE — 85046 RETICYTE/HGB CONCENTRATE: CPT | Performed by: INTERNAL MEDICINE

## 2025-02-27 ENCOUNTER — CLINICAL SUPPORT (OUTPATIENT)
Dept: ORTHOPEDIC SURGERY | Facility: CLINIC | Age: 70
End: 2025-02-27
Payer: MEDICARE

## 2025-02-27 VITALS — HEIGHT: 68 IN | TEMPERATURE: 96.8 F | BODY MASS INDEX: 24.4 KG/M2 | WEIGHT: 161 LBS

## 2025-02-27 DIAGNOSIS — R52 PAIN: Primary | ICD-10-CM

## 2025-02-27 DIAGNOSIS — M17.11 PRIMARY OSTEOARTHRITIS OF RIGHT KNEE: ICD-10-CM

## 2025-02-27 RX ORDER — LIDOCAINE HYDROCHLORIDE 10 MG/ML
3 INJECTION, SOLUTION EPIDURAL; INFILTRATION; INTRACAUDAL; PERINEURAL
Status: COMPLETED | OUTPATIENT
Start: 2025-02-27 | End: 2025-02-27

## 2025-02-27 RX ADMIN — LIDOCAINE HYDROCHLORIDE 3 ML: 10 INJECTION, SOLUTION EPIDURAL; INFILTRATION; INTRACAUDAL; PERINEURAL at 10:38

## 2025-02-27 NOTE — PROGRESS NOTES
2/27/2025    Blanka Mueller is here today for worsening knee pain. Pt has undergone injection of the knee in the past with good resolution of symptoms. Pt is requesting a repeat injection.     KNEE Injection Procedure Note:    Large Joint Arthrocentesis: R knee  Date/Time: 2/27/2025 10:38 AM  Consent given by: patient  Site marked: site marked  Timeout: Immediately prior to procedure a time out was called to verify the correct patient, procedure, equipment, support staff and site/side marked as required   Supporting Documentation  Indications: pain and joint swelling   Procedure Details  Location: knee - R knee  Preparation: Patient was prepped and draped in the usual sterile fashion  Needle size: 22 G  Approach: anterolateral  Medications administered: 3 mL lidocaine PF 1% 1 %; 32 mg Triamcinolone Acetonide 32 MG  Patient tolerance: patient tolerated the procedure well with no immediate complications    (3 mL of lidocaine was used for anesthetic purposes)      Imaging: X-rays 3views right knee (ap,lateral, sunrise) were ordered and reviewed for evaluation of knee pain demonstrating advanced varus osteoarthritis with bone on bone articulation, subchondral cysts, and periarticular osteophytes as well as moderate patellofemoral osteoarthritis with near bone-on-bone articulation. Today's xrays were compared to previous xrays and demonstrate further arthritic progression.     At the conclusion of the injection I discussed the importance of continued quad strengthening exercises on a daily basis. I will see the patient back if the symptoms should fail to improve or worsen.    Arturo Olivo, APRN  2/27/2025

## 2025-03-06 NOTE — PROGRESS NOTES
.     REASONS FOR FOLLOWUP: Essential thrombocythemia    HISTORY OF PRESENT ILLNESS:  The patient is a 69 y.o. year old female  who is here for follow-up with the above-mentioned history.    No new problems.  Taking Hydrea and Eliquis.  No significant bleeding issues    Past Medical History:   Diagnosis Date    Acute (reversible) ischemia of intestine, part and extent unspecified 01/20/2023    Acute embolism and thrombosis of unspecified deep veins of left lower extremity 01/20/2023    Acute torn meniscus     Right    Ankle sprain 1967    many times as a kid    Arthritis     DEGENERATIVE HANDS AND FEET    Arthritis of back     Arthritis of neck 2012    femoral neck in osteopenic range    CTS (carpal tunnel syndrome) 2011    or degenerative arthritis    Difficulty urinating     Dry eyes     Eczema     Esophageal varices     Essential (primary) hypertension     partial    Essential thrombocytosis     Fracture, foot 2021    not a fracture but you provide no space for my feet problem of arthritus/bunions/constant pain/flat footed    Gastritis     Great toe pain     Hiatal hernia     History of dizziness     History of DVT (deep vein thrombosis)     Hypertension     Hypothyroid 2022    Knee swelling 2020    Legal blindness     LEFT EYE    Low back strain 1980    not as bad now    Mesenteric venous thrombosis     PORTAL AND SPLENIC    Other primary thrombophilia 01/20/2023    Pain     ABD WITH CERTAIN FOOD    Pelvic pressure in female     WITH CERTAIN MOVEMENTS AND WHEN SITTING    Portal hypertension     Portal vein thrombosis     Splenic vein thrombosis     Splenomegaly     Tear of meniscus of knee 2020    R knee osteoarthritis/torn Mesiscus 2 places    Thyroid disease     Ureter, stricture     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 AND BEING FOLLOWED BY MICHELLE ALMEIDA MD     Past Surgical History:   Procedure Laterality Date    APPENDECTOMY N/A 1985    CHOLECYSTECTOMY  2001    COLONOSCOPY N/A 2019    CYSTOSCOPY Bilateral 10/14/2020     Procedure: CYSTOSCOPY RETROGRADE PYELOGRAM;  Surgeon: Jaiden eDsir MD;  Location: [facility] MAIN OR;  Service: Urology;  Laterality: Bilateral;    CYSTOSCOPY W/ URETERAL STENT REMOVAL      ENDOSCOPY N/A 2019    ENDOSCOPY Left 01/23/2023    Procedure: ESOPHAGOGASTRODUODENOSCOPY AT BEDSIDE;  Surgeon: Livia Mcmanus MD;  Location: [facility] ENDOSCOPY;  Service: Gastroenterology;  Laterality: Left;  PRE- HEMATOCHEZIA, ESOPHAGEAL VARICES, GASTRIC VARIX    EXPLORATORY LAPAROTOMY N/A 2001    EXPLORATORY LAPAROTOMY      ORIGINALLY LOOKING FOR OVARIAN CANCER, WAS NOT FOUND.  FOUND MESENTERIC/PORTAL/SPLENIC VENOUS THROMBOSIS    FOOT FUSION Left 10/25/2022    Procedure: Left first metatarsal phalangeal joint fusion;  Surgeon: Ulises Fairchild MD;  Location: [facility] OR OSC;  Service: Orthopedics;  Laterality: Left;    HERNIA REPAIR N/A     HERNIA REPAIR  2021    INGUINAL HERNIA REPAIR Left 01/20/2021    Procedure: open ventral hernia repair with mesh;  Surgeon: Yoanna Jacobo MD;  Location: [facility] OR;  Service: General;  Laterality: Left;    LAPAROSCOPIC CHOLECYSTECTOMY N/A 2001    TONSILLECTOMY  1971    URETERAL STENT INSERTION Left 2016    left ureter, temporary       MEDICATIONS    Current Outpatient Medications:     acetaminophen (TYLENOL) 500 MG tablet, Take 1 tablet by mouth Every 6 (Six) Hours As Needed for Mild Pain., Disp: , Rfl:     ascorbic acid (VITAMIN C) 500 MG tablet, Take 2 tablets by mouth Daily. PT HOLDING FOR SURGERY, Disp: , Rfl:     calcium carbonate (TUMS) 500 MG chewable tablet, Chew 1 tablet Daily., Disp: , Rfl:     Cholecalciferol (Vitamin D3) 50 MCG (2000 UT) capsule, Take 1 capsule by mouth Daily., Disp: , Rfl:     Eliquis 5 MG tablet tablet, Take 1 tablet by mouth Every 12 (Twelve) Hours., Disp: , Rfl:     hydroxyurea (HYDREA) 500 MG capsule, TAKE 1 CAPSULE BY MOUTH ONCE DAILY FOR 2 DAYS AND THEN 2 CAPSULES EVERY THIRD DAY., Disp: 120 capsule, Rfl: 1    levothyroxine (SYNTHROID,  "LEVOTHROID) 100 MCG tablet, Take 1 tablet by mouth Daily., Disp: , Rfl:     LIVER EXTRACT PO, Take  by mouth., Disp: , Rfl:     Magnesium Oxide 400 (240 Mg) MG tablet, Take 1 tablet by mouth Daily., Disp: , Rfl:     Misc Natural Products (JOINT HEALTH PO), Take  by mouth., Disp: , Rfl:     multivitamin (THERAGRAN) tablet tablet, Take 1 tablet by mouth Daily. PT HOLDING FOR SURGERY, Disp: , Rfl:     NADOLOL PO, Take 30 mg by mouth Daily. Nadolol 30 MG Tablet (sig: tablet once per day ), Disp: , Rfl:     Polyethyl Glycol-Propyl Glycol (SYSTANE FREE OP), Apply  to eye(s) as directed by provider 4 (Four) Times a Day., Disp: , Rfl:     Probiotic Product (PROBIOTIC DAILY PO), Take 1 capsule by mouth Every Night., Disp: , Rfl:     Zinc 100 MG tablet, Take 200 tablet/day by mouth., Disp: , Rfl:     levothyroxine (SYNTHROID, LEVOTHROID) 75 MCG tablet, Take 1 tablet by mouth Daily. (Patient not taking: Reported on 3/7/2025), Disp: , Rfl:     ALLERGIES:     Allergies   Allergen Reactions    Contrast Dye (Echo Or Unknown Ct/Mr) Swelling     FACIAL    Diclofenac Other (See Comments)     Bad heartburn causing pressure in the chest    Aspartame Itching    Morphine Headache    Chlorpheniramine Other (See Comments)    Heparin Unknown - Low Severity     HIT 2/11 per Dr. Neetu Dai    Lamisil [Terbinafine] Swelling     JOINT PAIN, REDNESS    Zoledronic Acid Other (See Comments)     \" Felt achy and felt like I got hit by a [employer] truck.\"    Dexamethasone GI Intolerance    Prednisone Rash and GI Intolerance     CAN TAKE IF NEEDED       SOCIAL HISTORY:       Social History     Socioeconomic History    Marital status:     Number of children: 3   Tobacco Use    Smoking status: Never     Passive exposure: Never    Smokeless tobacco: Never    Tobacco comments:     Patient does not smoke   Vaping Use    Vaping status: Never Used   Substance and Sexual Activity    Alcohol use: Yes     Alcohol/week: 1.0 standard drink of alcohol     Types: " "1 Glasses of wine per week     Comment: for special occasions    Drug use: Never    Sexual activity: Never         FAMILY HISTORY:  Family History   Problem Relation Age of Onset    Osteoporosis Mother     Heart disease Father     Tuberculosis Father     Breast cancer Sister     Cancer Sister         lymphnodes    Cancer Sister         pallete    Osteoporosis Sister     Colon cancer Paternal Grandfather     Malig Hyperthermia Neg Hx        REVIEW OF SYSTEMS:  Review of Systems   Constitutional:  Negative for activity change.   HENT:  Negative for nosebleeds and trouble swallowing.    Respiratory:  Negative for shortness of breath and wheezing.    Cardiovascular:  Negative for chest pain and palpitations.   Gastrointestinal:  Negative for constipation, diarrhea and nausea.   Genitourinary:  Negative for dysuria and hematuria.   Musculoskeletal:  Negative for arthralgias and myalgias.   Skin:  Negative for rash and wound.   Neurological:  Negative for seizures and syncope.   Hematological:  Negative for adenopathy. Does not bruise/bleed easily.   Psychiatric/Behavioral:  Negative for confusion.             Vitals:    03/07/25 1112   BP: 124/82   Pulse: 57   Resp: 16   Temp: 98.4 °F (36.9 °C)   SpO2: 97%   Weight: 71.8 kg (158 lb 3.2 oz)   Height: 172.7 cm (67.99\")           3/7/2025    11:14 AM   Current Status   ECOG score 0        PHYSICAL EXAM:        CONSTITUTIONAL:  Vital signs reviewed.  No distress, looks comfortable.  EYES:  Conjunctiva and lids unremarkable.  PERRLA  EARS,NOSE,MOUTH,THROAT:  Ears and nose appear unremarkable.  Lips, teeth, gums appear unremarkable.  RESPIRATORY:  Normal respiratory effort.  Lungs clear to auscultation bilaterally.  CARDIOVASCULAR:  Normal S1, S2.  No murmurs rubs or gallops.  No significant lower extremity edema.  GASTROINTESTINAL: Abdomen appears unremarkable.  Nontender.  No hepatomegaly.  No splenomegaly.  LYMPHATIC:  No cervical, supraclavicular, axillary " lymphadenopathy.  SKIN:  Warm.  No rashes.  PSYCHIATRIC:  Normal judgment and insight.  Normal mood and affect.         RECENT LABS:        WBC   Date/Time Value Ref Range Status   02/25/2025 09:44 AM 5.85 3.40 - 10.80 10*3/mm3 Final     Hemoglobin   Date/Time Value Ref Range Status   02/25/2025 09:44 AM 12.2 12.0 - 15.9 g/dL Final     Platelets   Date/Time Value Ref Range Status   02/25/2025 09:44  140 - 450 10*3/mm3 Final       Assessment & Plan   Iron deficiency anemia due to chronic blood loss  - CBC & Differential  - Ferritin  - Iron Profile  - Retic With IRF & RET-He  - Comprehensive Metabolic Panel  - CBC & Differential  - CBC & Differential    Thrombocytosis  - CBC & Differential  - Ferritin  - Iron Profile  - Retic With IRF & RET-He  - Comprehensive Metabolic Panel  - CBC & Differential  - CBC & Differential    Malabsorption of iron  - CBC & Differential  - Ferritin  - Iron Profile  - Retic With IRF & RET-He  - Comprehensive Metabolic Panel  - CBC & Differential  - CBC & Differential          Blanka Mueller   *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  On Hydrea 1000 mg twice per week, 500 mg other days      *Thrombophilia with a history of extensive mesenteric vein, portal vein, and splenic vein thrombosis around 2001.  And bilateral PE at some point  Bilateral pulmonary emboli at some point.  Records unclear as to when  worked up at the [facility] at that time with no obvious thrombophilic defect.  On Coumadin including after left peroneal vein DVT 12/19/2022, after surgery and left foot for osteophyte.  Had some subtherapeutic INR's around that time.  3/21/2023, Dr. Singh changed her to Irene  No evidence to suggest recurrent clotting.  No bleeding problems    *Iron deficiency anemia  Does not respond to oral iron due to poor absorption.  History of Injectafer (in the setting of a myeloproliferative disorder, but  given due to anemia)  8/16/2024: Ferritin 178, 30% saturation, Hb 12.4.  2/25/2025: Ferritin 141, 30% saturation, Hb 12.2.  Reminded her that even if she was low on iron, unless she had anemia would not plan IV iron since she has a myeloproliferative disorder    *Portal hypertension leading to esophageal and rectal varices    *January 2023: Extensive GI bleeding with varices requiring banding of esophageal and rectal varices at [facility]    *Splenic artery embolization by interventional radiology at [facility], 2/1/2023    *Elevated AST on 8/16/2024 and previously chronically elevated alkaline phosphatase  8/16/2024: Denies alcohol.  Takes 325 mg of acetaminophen per day.  Follows with Dr. Ky Erwin, and sees him on 8/19/2024.  She will discuss the elevated AST with him.  Defer further follow-up or treatment of this to him    *Elevated LDH  525 on 8/16/2024, checked by Dr. Singh.  I would not usually order this test for her problems but because it was elevated I recommended checking it again.  10/10/2024: , remains elevated but significantly better, not planning further LDH checks.    *8/16/2024: Initial visit with me (prior patient of Dr. Singh's).    Plan  CBC every 2 months with nurse review    MD 6 months. 1 week prior: Ferritin, iron panel, CBC, reticulate hemoglobin, CMP   Following iron labs because has been anemic from iron deficiency in the past, which required IV iron  (But will use caution regarding IV iron considering myeloproliferative disorder)  Continue Hydrea.  Last dose change was 2/9/2024, from I believe 500 mg daily up to 1000 mg twice per week and 500 mg on other days   Continue Eliquis 5 mg twice per day  ur office.

## 2025-03-07 ENCOUNTER — TELEPHONE (OUTPATIENT)
Dept: ONCOLOGY | Facility: CLINIC | Age: 70
End: 2025-03-07

## 2025-03-07 ENCOUNTER — OFFICE VISIT (OUTPATIENT)
Dept: ONCOLOGY | Facility: CLINIC | Age: 70
End: 2025-03-07
Payer: MEDICARE

## 2025-03-07 ENCOUNTER — SPECIALTY PHARMACY (OUTPATIENT)
Dept: ONCOLOGY | Facility: HOSPITAL | Age: 70
End: 2025-03-07
Payer: MEDICARE

## 2025-03-07 VITALS
BODY MASS INDEX: 23.98 KG/M2 | TEMPERATURE: 98.4 F | WEIGHT: 158.2 LBS | OXYGEN SATURATION: 97 % | HEIGHT: 68 IN | SYSTOLIC BLOOD PRESSURE: 124 MMHG | DIASTOLIC BLOOD PRESSURE: 82 MMHG | HEART RATE: 57 BPM | RESPIRATION RATE: 16 BRPM

## 2025-03-07 DIAGNOSIS — K90.9 MALABSORPTION OF IRON: ICD-10-CM

## 2025-03-07 DIAGNOSIS — D75.839 THROMBOCYTOSIS: ICD-10-CM

## 2025-03-07 DIAGNOSIS — D50.0 IRON DEFICIENCY ANEMIA DUE TO CHRONIC BLOOD LOSS: Primary | ICD-10-CM

## 2025-03-07 RX ORDER — LEVOTHYROXINE SODIUM 100 UG/1
100 TABLET ORAL DAILY
COMMUNITY
Start: 2025-03-05

## 2025-03-07 NOTE — TELEPHONE ENCOUNTER
"Caller: Blanka Mueller \"Lizz\"    Relationship to patient: Self    Best call back number: [phone]    Chief complaint: RESCHEDULE     Type of visit: LABS AND RN REVIEW     Requested date: ANY DAY BUT FRIDAY AROUND 10 or 10:30     If rescheduling, when is the original appointment: 5-2, 6-27, 8-15     Additional notes:PLEASE ADVISE          "

## 2025-03-10 ENCOUNTER — SPECIALTY PHARMACY (OUTPATIENT)
Dept: PHARMACY | Facility: HOSPITAL | Age: 70
End: 2025-03-10
Payer: MEDICARE

## 2025-03-10 RX ORDER — HYDROXYUREA 500 MG/1
CAPSULE ORAL
Qty: 108 CAPSULE | Refills: 1 | Status: SHIPPED | OUTPATIENT
Start: 2025-03-10

## 2025-03-10 NOTE — PROGRESS NOTES
Specialty Pharmacy Note: Hydrea (hydroxyurea)    Blanka Mueller is a 69 y.o. female with RA 2 + myeloproliferative disorder was seen 3/7/25 by Dr. Bryant. Per provider dictation, no changes to oral oncology regimen Hydrea (hydroxyurea) 1000 mg po twice weekly on Mondays and Thursdays and 500 mg all other days.  Labs Review: The CMP and CBC from 3/7/25 have been reviewed. No dose adjustments are needed for the oral specialty medication(s) based on the labs.    Specialty pharmacy will continue to follow patient.    Shobha Macias RPH, KIARA  3/10/2025  09:32 EDT

## 2025-03-10 NOTE — TELEPHONE ENCOUNTER
Specialty Pharmacy Patient Management Program  Per Protocol Prescription Order or Refill     Patient will be filling or currently fills medications at  [facility]  and is enrolled in the Patient Management Program.    Requested Prescriptions     Signed Prescriptions Disp Refills    hydroxyurea (HYDREA) 500 MG capsule 108 capsule 1     Sig: Take 2 caps ( 1000 mg) by mouth daily on Mondays and Thursdays and take 1 cap ( 500 mg) all other days of the week.     Authorizing Provider: HA DESHPANDE II     Ordering User: DEIDRA MACIAS     Prescription orders above were sent to the pharmacy per Collaborative Care Agreement Protocol.     Last Office Visit: 3/7/25  Next Office Visit: 8/22/25    Shobha Macias Rph, BCOP  Clinical Specialty Pharmacist, Oncology  3/10/2025  10:57 EDT

## 2025-03-10 NOTE — TELEPHONE ENCOUNTER
Specialty Pharmacy Patient Management Program  Per Protocol Prescription Order or Refill       Requested Prescriptions     Signed Prescriptions Disp Refills    hydroxyurea (HYDREA) 500 MG capsule 108 capsule 1     Sig: Take 2 caps ( 1000 mg) by mouth daily on Mondays and Thursdays and take 1 cap ( 500 mg) all other days of the week.     Authorizing Provider: HA DESHPANDE II     Ordering User: DEIDRA ABDI     Prescription orders above were sent to  [facility]  per Collaborative Care Agreement Protocol.     Completed independent double check on medication order/RX.    Oc Ramsey, PharmD, BCOP  Clinical Specialty Pharmacist, Oncology  3/10/2025  11:03 EDT

## 2025-04-30 ENCOUNTER — LAB (OUTPATIENT)
Dept: OTHER | Facility: HOSPITAL | Age: 70
End: 2025-04-30
Payer: MEDICARE

## 2025-04-30 ENCOUNTER — CLINICAL SUPPORT (OUTPATIENT)
Dept: ONCOLOGY | Facility: HOSPITAL | Age: 70
End: 2025-04-30
Payer: MEDICARE

## 2025-04-30 DIAGNOSIS — D75.839 THROMBOCYTOSIS: ICD-10-CM

## 2025-04-30 DIAGNOSIS — K90.9 MALABSORPTION OF IRON: ICD-10-CM

## 2025-04-30 DIAGNOSIS — D50.0 IRON DEFICIENCY ANEMIA DUE TO CHRONIC BLOOD LOSS: ICD-10-CM

## 2025-04-30 LAB
BASOPHILS # BLD AUTO: 0.08 10*3/MM3 (ref 0–0.2)
BASOPHILS NFR BLD AUTO: 1.1 % (ref 0–1.5)
DEPRECATED RDW RBC AUTO: 52.6 FL (ref 37–54)
EOSINOPHIL # BLD AUTO: 0.31 10*3/MM3 (ref 0–0.4)
EOSINOPHIL NFR BLD AUTO: 4.4 % (ref 0.3–6.2)
ERYTHROCYTE [DISTWIDTH] IN BLOOD BY AUTOMATED COUNT: 14.2 % (ref 12.3–15.4)
HCT VFR BLD AUTO: 35.7 % (ref 34–46.6)
HGB BLD-MCNC: 12.6 G/DL (ref 12–15.9)
IMM GRANULOCYTES # BLD AUTO: 0.06 10*3/MM3 (ref 0–0.05)
IMM GRANULOCYTES NFR BLD AUTO: 0.9 % (ref 0–0.5)
LYMPHOCYTES # BLD AUTO: 1.54 10*3/MM3 (ref 0.7–3.1)
LYMPHOCYTES NFR BLD AUTO: 21.8 % (ref 19.6–45.3)
MCH RBC QN AUTO: 35.6 PG (ref 26.6–33)
MCHC RBC AUTO-ENTMCNC: 35.3 G/DL (ref 31.5–35.7)
MCV RBC AUTO: 100.8 FL (ref 79–97)
MONOCYTES # BLD AUTO: 0.6 10*3/MM3 (ref 0.1–0.9)
MONOCYTES NFR BLD AUTO: 8.5 % (ref 5–12)
NEUTROPHILS NFR BLD AUTO: 4.46 10*3/MM3 (ref 1.7–7)
NEUTROPHILS NFR BLD AUTO: 63.3 % (ref 42.7–76)
NRBC BLD AUTO-RTO: 0 /100 WBC (ref 0–0.2)
PLATELET # BLD AUTO: 346 10*3/MM3 (ref 140–450)
PMV BLD AUTO: 10.5 FL (ref 6–12)
RBC # BLD AUTO: 3.54 10*6/MM3 (ref 3.77–5.28)
WBC NRBC COR # BLD AUTO: 7.05 10*3/MM3 (ref 3.4–10.8)

## 2025-04-30 PROCEDURE — 85025 COMPLETE CBC W/AUTO DIFF WBC: CPT | Performed by: INTERNAL MEDICINE

## 2025-04-30 NOTE — PROGRESS NOTES
Blanka Mueller is a 69 y.o. female with Thrombocytosis, Thrombophilia, Positive JAK2 mutation seen by Hematology/Oncology provider, Dr. Bryant, and is scheduled with Clinical Review offered by [facility]. Patient's visit was held via telephone today.     Therapy plan  Hydroxyurea    Current Medication List  Medication Sig Start Date End Date Taking? Authorizing Provider   ascorbic acid (VITAMIN C) 500 MG tablet Take 2 tablets by mouth Daily. PT HOLDING FOR SURGERY    Kala Be MD   calcium carbonate (TUMS) 500 MG chewable tablet Chew 1 tablet Daily.  Patient not taking: Reported on 2/27/2024    Kala Be MD   Cholecalciferol (Vitamin D3) 50 MCG (2000 UT) capsule Take 1 capsule by mouth Daily.    Kala Be MD   Eliquis 5 MG tablet tablet Take 1 tablet by mouth Every 12 (Twelve) Hours. 3/16/23   Kala Be MD   Glucosamine-Chondroitin-MSM [id number] MG pack Take  by mouth.    Kala Be MD   hydroxyurea (HYDREA) 500 MG capsule Take 1 capsule ( 500 mg) by mouth  daily for 2 days, then take 2 caps ( 1000 mg) every third day. 2/9/24   Marc Singh MD   hydrOXYzine (ATARAX) 25 MG tablet Take 1-2 tablets by mouth As Needed. 3/28/23   Kala Be MD   levothyroxine (SYNTHROID, LEVOTHROID) 50 MCG tablet Take 1 tablet by mouth Daily. 12/8/22   Kala Be MD   Magnesium Oxide 400 (240 Mg) MG tablet Take 1 tablet by mouth Daily.    Kala Be MD   melatonin 3 MG tablet Take 1 tablet by mouth At Night As Needed for Sleep. 1/23/23   Nabil Osuna MD   multivitamin (THERAGRAN) tablet tablet Take 1 tablet by mouth Daily. PT HOLDING FOR SURGERY    Kala Be MD   nadolol (CORGARD) 20 MG tablet Take 1.5 tablets by mouth Every Morning.    Kala Be MD   Probiotic Product (PROBIOTIC DAILY PO) Take 1 capsule by mouth Every Night.    Kala Be MD   triamcinolone (KENALOG)  0.1 % cream Apply 1 application  topically to the appropriate area as directed As Needed. 9/1/23 8/31/24  ProviderKala MD   Zinc 100 MG tablet Take 200 tablet/day by mouth.    Provider, MD Kala       Relevant Laboratory Values  Lab Results   Component Value Date    WBC 7.05 04/30/2025    RBC 3.54 (L) 04/30/2025    HGB 12.6 04/30/2025    HCT 35.7 04/30/2025    .8 (H) 04/30/2025    MCH 35.6 (H) 04/30/2025    MCHC 35.3 04/30/2025    RDW 14.2 04/30/2025    RDWSD 52.6 04/30/2025    MPV 10.5 04/30/2025     04/30/2025    NEUTRORELPCT 63.3 04/30/2025    LYMPHORELPCT 21.8 04/30/2025    MONORELPCT 8.5 04/30/2025    EOSRELPCT 4.4 04/30/2025    BASORELPCT 1.1 04/30/2025    AUTOIGPER 0.9 (H) 04/30/2025    NEUTROABS 4.46 04/30/2025    LYMPHSABS 1.54 04/30/2025    MONOSABS 0.60 04/30/2025    EOSABS 0.31 04/30/2025    BASOSABS 0.08 04/30/2025    AUTOIGNUM 0.06 (H) 04/30/2025    NRBC 0.0 04/30/2025       Clinical Review  Patient reports adherence to Hydrea and alternates between 500 mg for two days and then 1000 mg on the third day (every third day patient takes 1000 mg Hydrea). Patient also reports adherence to Eliquis. Patient denies any signs/symptoms of bruising or bleeding.     Platelets 346  Hemoglobin 12.6    The patient's current therapy plan and above labs have been reviewed.     Plan  CBC reviewed, results are stable for this patient at this time. - see above in Laboratory Results  Will instruct Blanka Mueller to continue Eliquis 5 mg twice daily and continue Hydrea at current dosage (takes 1000 mg on Monday and Thursday and 500 mg on all other days).  Follow up in two months. Next scheduled appointment(s) reviewed with patient.  Patient is instructed to call the office with any concerns or new symptoms prior to next visit.  Verbal information provided. Patient expresses understanding and has no further questions at this time.            Sinai Scott, PharmD  Clinical Pharmacy Services    [facility]  4/30/2025  11:28 EDT

## 2025-06-03 ENCOUNTER — CLINICAL SUPPORT (OUTPATIENT)
Dept: ORTHOPEDIC SURGERY | Facility: CLINIC | Age: 70
End: 2025-06-03
Payer: MEDICARE

## 2025-06-03 VITALS — WEIGHT: 162.6 LBS | BODY MASS INDEX: 24.64 KG/M2 | HEIGHT: 68 IN | TEMPERATURE: 97.8 F

## 2025-06-03 DIAGNOSIS — M17.11 PRIMARY OSTEOARTHRITIS OF RIGHT KNEE: Primary | ICD-10-CM

## 2025-06-03 PROCEDURE — 99212 OFFICE O/P EST SF 10 MIN: CPT | Performed by: NURSE PRACTITIONER

## 2025-06-03 RX ORDER — COLLAGEN, HYDROLYSATE (BOVINE) 100 %
10 POWDER (GRAM) MISCELLANEOUS DAILY
COMMUNITY

## 2025-06-03 RX ORDER — LEVOTHYROXINE SODIUM 50 UG/1
100 TABLET ORAL DAILY
COMMUNITY
Start: 2025-04-15

## 2025-06-03 NOTE — PROGRESS NOTES
"Patient: Blanka Mueller  YOB: 1955 69 y.o. female  Medical Record Number: [id number]    Chief Complaints:   Chief Complaint   Patient presents with    Right Knee - Follow-up, Pain, Injections       History of Present Illness:Blanka Mueller is a 69 y.o. female who presents for follow-up of right knee pain that is chronic in nature.  Patient has known advanced right knee osteoarthritis that we have been conservatively treating.  Patient reports that she has a recently started taking collagen for joint improvement and seems to think that this is improved her symptoms.  She states that her pain level is very minimal and rates it as a 1 or 2 when she has it.  She states as of right now currently sitting in the chair she has no pain today.  States that the symptoms started improving shortly after beginning the supplement.     Allergies:   Allergies   Allergen Reactions    Contrast Dye (Echo Or Unknown Ct/Mr) Swelling     FACIAL    Diclofenac Other (See Comments)     Bad heartburn causing pressure in the chest    Aspartame Itching    Morphine Headache    Chlorpheniramine Other (See Comments)    Heparin Unknown - Low Severity     HIT 2/11 per Dr. Neetu Dai    Lamisil [Terbinafine] Swelling     JOINT PAIN, REDNESS    Zoledronic Acid Other (See Comments)     \" Felt achy and felt like I got hit by a [employer] truck.\"    Dexamethasone GI Intolerance    Prednisone Rash and GI Intolerance     CAN TAKE IF NEEDED       Medications:   Current Outpatient Medications   Medication Sig Dispense Refill    acetaminophen (TYLENOL) 500 MG tablet Take 1 tablet by mouth Every 6 (Six) Hours As Needed for Mild Pain.      ascorbic acid (VITAMIN C) 500 MG tablet Take 2 tablets by mouth Daily. PT HOLDING FOR SURGERY      calcium carbonate (TUMS) 500 MG chewable tablet Chew 1 tablet Daily.      Cholecalciferol (Vitamin D3) 50 MCG (2000 UT) capsule Take 1 capsule by mouth Daily.      Collagen Hydrolysate powder Use 10 g Daily. " "     Eliquis 5 MG tablet tablet Take 1 tablet by mouth Every 12 (Twelve) Hours.      hydroxyurea (HYDREA) 500 MG capsule Take 2 caps ( 1000 mg) by mouth daily on Mondays and Thursdays and take 1 cap ( 500 mg) all other days of the week. 108 capsule 1    levothyroxine (SYNTHROID, LEVOTHROID) 50 MCG tablet Take 2 tablets by mouth Daily.      LIVER EXTRACT PO Take  by mouth.      Magnesium Oxide 400 (240 Mg) MG tablet Take 1 tablet by mouth Daily.      multivitamin (THERAGRAN) tablet tablet Take 1 tablet by mouth Daily. PT HOLDING FOR SURGERY      NADOLOL PO Take 30 mg by mouth Daily. Nadolol 30 MG Tablet (sig: tablet once per day )      Probiotic Product (PROBIOTIC DAILY PO) Take 1 capsule by mouth Every Night.      Zinc 100 MG tablet Take 200 tablet/day by mouth.       No current facility-administered medications for this visit.         The following portions of the patient's history were reviewed and updated as appropriate: allergies, current medications, past family history, past medical history, past social history, past surgical history and problem list.    Review of Systems:   Pertinent positives/negative listed in HPI above    Physical Exam:   Vitals:    06/03/25 1044   Temp: 97.8 °F (36.6 °C)   TempSrc: Temporal   Weight: 73.8 kg (162 lb 9.6 oz)   Height: 172.7 cm (68\")   PainSc: 2    PainLoc: Knee       General: A and O x 3, ASA, NAD      Knee Exam List: Knee:  right    ALIGNMENT:     Varus  ,   Patella  tracks  midline    GAIT:    Antalgic    SKIN:    No abnormality    RANGE OF MOTION:   0  -  122   DEG    STRENGTH:   4  / 5    LIGAMENTS:    No varus / valgus instability.   Negative  Lachman.    MENISCUS:     Negative   Jessie       DISTAL PULSES:    Paplable    DISTAL SENSATION :   Intact    LYMPHATICS:     No   lymphadenopathy    OTHER:          - Positive   effusion      - Crepitance with ROM     Radiology:  Xrays 3views right knee (ap,lateral, sunrise) taken previously demonstratingadvanced varus " osteoarthritis with bone on bone articulation, subchondral cysts, and periarticular osteophytes.  No new x-rays were taken today for comparison purposes.      Assessment/Plan: Primary osteoarthritis right knee    Since patient's symptoms seem to be controlled now with minimal pain she can continue with the current course.  I did encourage her to continue with her quad strengthening and range of motion exercises.  She was actually scheduled for an intra-articular joint injection today which we will forego considering that it is not warranted today.  She can follow back up with us on an as-needed basis.    Arturo Olivo, APRN  6/3/2025

## 2025-06-26 ENCOUNTER — LAB (OUTPATIENT)
Dept: OTHER | Facility: HOSPITAL | Age: 70
End: 2025-06-26
Payer: MEDICARE

## 2025-06-26 ENCOUNTER — CLINICAL SUPPORT (OUTPATIENT)
Dept: ONCOLOGY | Facility: HOSPITAL | Age: 70
End: 2025-06-26
Payer: MEDICARE

## 2025-06-26 DIAGNOSIS — D50.0 IRON DEFICIENCY ANEMIA DUE TO CHRONIC BLOOD LOSS: ICD-10-CM

## 2025-06-26 DIAGNOSIS — K90.9 MALABSORPTION OF IRON: ICD-10-CM

## 2025-06-26 DIAGNOSIS — D75.839 THROMBOCYTOSIS: ICD-10-CM

## 2025-06-26 LAB
BASOPHILS # BLD AUTO: 0.08 10*3/MM3 (ref 0–0.2)
BASOPHILS NFR BLD AUTO: 1.2 % (ref 0–1.5)
DEPRECATED RDW RBC AUTO: 53.4 FL (ref 37–54)
EOSINOPHIL # BLD AUTO: 0.3 10*3/MM3 (ref 0–0.4)
EOSINOPHIL NFR BLD AUTO: 4.6 % (ref 0.3–6.2)
ERYTHROCYTE [DISTWIDTH] IN BLOOD BY AUTOMATED COUNT: 13.8 % (ref 12.3–15.4)
HCT VFR BLD AUTO: 34.9 % (ref 34–46.6)
HGB BLD-MCNC: 11.9 G/DL (ref 12–15.9)
IMM GRANULOCYTES # BLD AUTO: 0.03 10*3/MM3 (ref 0–0.05)
IMM GRANULOCYTES NFR BLD AUTO: 0.5 % (ref 0–0.5)
LYMPHOCYTES # BLD AUTO: 1.34 10*3/MM3 (ref 0.7–3.1)
LYMPHOCYTES NFR BLD AUTO: 20.4 % (ref 19.6–45.3)
MCH RBC QN AUTO: 35.4 PG (ref 26.6–33)
MCHC RBC AUTO-ENTMCNC: 34.1 G/DL (ref 31.5–35.7)
MCV RBC AUTO: 103.9 FL (ref 79–97)
MONOCYTES # BLD AUTO: 0.52 10*3/MM3 (ref 0.1–0.9)
MONOCYTES NFR BLD AUTO: 7.9 % (ref 5–12)
NEUTROPHILS NFR BLD AUTO: 4.31 10*3/MM3 (ref 1.7–7)
NEUTROPHILS NFR BLD AUTO: 65.4 % (ref 42.7–76)
NRBC BLD AUTO-RTO: 0 /100 WBC (ref 0–0.2)
PLATELET # BLD AUTO: 369 10*3/MM3 (ref 140–450)
PMV BLD AUTO: 10.7 FL (ref 6–12)
RBC # BLD AUTO: 3.36 10*6/MM3 (ref 3.77–5.28)
WBC NRBC COR # BLD AUTO: 6.58 10*3/MM3 (ref 3.4–10.8)

## 2025-06-26 PROCEDURE — 85025 COMPLETE CBC W/AUTO DIFF WBC: CPT | Performed by: INTERNAL MEDICINE

## 2025-06-26 PROCEDURE — 36415 COLL VENOUS BLD VENIPUNCTURE: CPT

## 2025-06-26 NOTE — PROGRESS NOTES
Blanka Mueller is a 69 y.o. female with Thrombocytosis, Thrombophilia, Positive JAK2 mutation seen by Hematology/Oncology provider, Dr. Bryant, and is scheduled with Clinical Review offered by [facility]. Patient's visit was held via telephone today.     Therapy plan  Hydroxyurea    Current Medication List  Medication Sig Start Date End Date Taking? Authorizing Provider   ascorbic acid (VITAMIN C) 500 MG tablet Take 2 tablets by mouth Daily. PT HOLDING FOR SURGERY    Kala Be MD   calcium carbonate (TUMS) 500 MG chewable tablet Chew 1 tablet Daily.  Patient not taking: Reported on 2/27/2024    Kala Be MD   Cholecalciferol (Vitamin D3) 50 MCG (2000 UT) capsule Take 1 capsule by mouth Daily.    Kala Be MD   Eliquis 5 MG tablet tablet Take 1 tablet by mouth Every 12 (Twelve) Hours. 3/16/23   Kala Be MD   Glucosamine-Chondroitin-MSM [id number] MG pack Take  by mouth.    Kala Be MD   hydroxyurea (HYDREA) 500 MG capsule Take 1 capsule ( 500 mg) by mouth  daily for 2 days, then take 2 caps ( 1000 mg) every third day. 2/9/24   Marc Singh MD   hydrOXYzine (ATARAX) 25 MG tablet Take 1-2 tablets by mouth As Needed. 3/28/23   Kala Be MD   levothyroxine (SYNTHROID, LEVOTHROID) 50 MCG tablet Take 1 tablet by mouth Daily. 12/8/22   Kala Be MD   Magnesium Oxide 400 (240 Mg) MG tablet Take 1 tablet by mouth Daily.    Kala Be MD   melatonin 3 MG tablet Take 1 tablet by mouth At Night As Needed for Sleep. 1/23/23   Nabil Osuna MD   multivitamin (THERAGRAN) tablet tablet Take 1 tablet by mouth Daily. PT HOLDING FOR SURGERY    Kala Be MD   nadolol (CORGARD) 20 MG tablet Take 1.5 tablets by mouth Every Morning.    Kala Be MD   Probiotic Product (PROBIOTIC DAILY PO) Take 1 capsule by mouth Every Night.    Kala Be MD   triamcinolone (KENALOG)  Pt c/o of SOB and cough. States that he was diagnosed with rhinovirus and hospitalized a couple months ago with same symptoms. 95% on RA. Dyspnea with exertion. 0.1 % cream Apply 1 application  topically to the appropriate area as directed As Needed. 9/1/23 8/31/24  Provider, MD Kala   Zinc 100 MG tablet Take 200 tablet/day by mouth.    Provider, MD Kala       Relevant Laboratory Values  Lab Results   Component Value Date    WBC 6.58 06/26/2025    RBC 3.36 (L) 06/26/2025    HGB 11.9 (L) 06/26/2025    HCT 34.9 06/26/2025    .9 (H) 06/26/2025    MCH 35.4 (H) 06/26/2025    MCHC 34.1 06/26/2025    RDW 13.8 06/26/2025    RDWSD 53.4 06/26/2025    MPV 10.7 06/26/2025     06/26/2025    NEUTRORELPCT 65.4 06/26/2025    LYMPHORELPCT 20.4 06/26/2025    MONORELPCT 7.9 06/26/2025    EOSRELPCT 4.6 06/26/2025    BASORELPCT 1.2 06/26/2025    AUTOIGPER 0.5 06/26/2025    NEUTROABS 4.31 06/26/2025    LYMPHSABS 1.34 06/26/2025    MONOSABS 0.52 06/26/2025    EOSABS 0.30 06/26/2025    BASOSABS 0.08 06/26/2025    AUTOIGNUM 0.03 06/26/2025    NRBC 0.0 06/26/2025       Clinical Review  Patient reports adherence to Hydrea and alternates between 500 mg for two days and then 1000 mg on the third day (every third day patient takes 1000 mg Hydrea). Patient also reports adherence to Eliquis. Patient denies any signs/symptoms of bruising or bleeding.     Platelets 369  Hemoglobin 11.9    The patient's current therapy plan and above labs have been reviewed.     Plan  CBC results reviewed with patient. - see above in Laboratory Results  Will instruct Blanka LIVINGSTON Ami to continue Eliquis 5 mg twice daily and continue Hydrea at current dosage (takes 1000 mg on Monday and Thursday and 500 mg on all other days).  Follow up in two months when scheduled with Dr. Bryant. Next scheduled appointment(s) reviewed with patient.  Patient is instructed to call the office with any concerns or new symptoms prior to next visit.  Verbal information provided. Patient expresses understanding and has no further questions at this time.            Sinai Scott, PharmD  Clinical Pharmacy Services    [facility]  6/26/2025  12:34 EDT

## 2025-08-14 ENCOUNTER — LAB (OUTPATIENT)
Dept: OTHER | Facility: HOSPITAL | Age: 70
End: 2025-08-14
Payer: MEDICARE

## 2025-08-14 DIAGNOSIS — D75.839 THROMBOCYTOSIS: ICD-10-CM

## 2025-08-14 DIAGNOSIS — D50.0 IRON DEFICIENCY ANEMIA DUE TO CHRONIC BLOOD LOSS: ICD-10-CM

## 2025-08-14 DIAGNOSIS — K90.9 MALABSORPTION OF IRON: ICD-10-CM

## 2025-08-14 LAB
ALBUMIN SERPL-MCNC: 3.9 G/DL (ref 3.5–5.2)
ALBUMIN/GLOB SERPL: 1.3 G/DL
ALP SERPL-CCNC: 78 U/L (ref 39–117)
ALT SERPL W P-5'-P-CCNC: 18 U/L (ref 1–33)
ANION GAP SERPL CALCULATED.3IONS-SCNC: 10.7 MMOL/L (ref 5–15)
AST SERPL-CCNC: 23 U/L (ref 1–32)
BASOPHILS # BLD AUTO: 0.07 10*3/MM3 (ref 0–0.2)
BASOPHILS NFR BLD AUTO: 1.1 % (ref 0–1.5)
BILIRUB SERPL-MCNC: 0.6 MG/DL (ref 0–1.2)
BUN SERPL-MCNC: 23 MG/DL (ref 8–23)
BUN/CREAT SERPL: 33.3 (ref 7–25)
CALCIUM SPEC-SCNC: 8.9 MG/DL (ref 8.6–10.5)
CHLORIDE SERPL-SCNC: 104 MMOL/L (ref 98–107)
CO2 SERPL-SCNC: 25.3 MMOL/L (ref 22–29)
CREAT SERPL-MCNC: 0.69 MG/DL (ref 0.57–1)
DEPRECATED RDW RBC AUTO: 53.2 FL (ref 37–54)
EGFRCR SERPLBLD CKD-EPI 2021: 94.7 ML/MIN/1.73
EOSINOPHIL # BLD AUTO: 0.33 10*3/MM3 (ref 0–0.4)
EOSINOPHIL NFR BLD AUTO: 5.3 % (ref 0.3–6.2)
ERYTHROCYTE [DISTWIDTH] IN BLOOD BY AUTOMATED COUNT: 14 % (ref 12.3–15.4)
FERRITIN SERPL-MCNC: 137 NG/ML (ref 13–150)
GLOBULIN UR ELPH-MCNC: 3.1 GM/DL
GLUCOSE SERPL-MCNC: 80 MG/DL (ref 65–99)
HCT VFR BLD AUTO: 33.5 % (ref 34–46.6)
HGB BLD-MCNC: 11.2 G/DL (ref 12–15.9)
HGB RETIC QN AUTO: 36.3 PG (ref 29.8–36.1)
IMM GRANULOCYTES # BLD AUTO: 0.07 10*3/MM3 (ref 0–0.05)
IMM GRANULOCYTES NFR BLD AUTO: 1.1 % (ref 0–0.5)
IMM RETICS NFR: 8.9 % (ref 3–15.8)
IRON 24H UR-MRATE: 80 MCG/DL (ref 37–145)
IRON SATN MFR SERPL: 27 % (ref 20–50)
LYMPHOCYTES # BLD AUTO: 1.54 10*3/MM3 (ref 0.7–3.1)
LYMPHOCYTES NFR BLD AUTO: 24.9 % (ref 19.6–45.3)
MCH RBC QN AUTO: 34.7 PG (ref 26.6–33)
MCHC RBC AUTO-ENTMCNC: 33.4 G/DL (ref 31.5–35.7)
MCV RBC AUTO: 103.7 FL (ref 79–97)
MONOCYTES # BLD AUTO: 0.66 10*3/MM3 (ref 0.1–0.9)
MONOCYTES NFR BLD AUTO: 10.7 % (ref 5–12)
NEUTROPHILS NFR BLD AUTO: 3.52 10*3/MM3 (ref 1.7–7)
NEUTROPHILS NFR BLD AUTO: 56.9 % (ref 42.7–76)
NRBC BLD AUTO-RTO: 0 /100 WBC (ref 0–0.2)
PLATELET # BLD AUTO: 332 10*3/MM3 (ref 140–450)
PMV BLD AUTO: 10.5 FL (ref 6–12)
POTASSIUM SERPL-SCNC: 3.7 MMOL/L (ref 3.5–5.2)
PROT SERPL-MCNC: 7 G/DL (ref 6–8.5)
RBC # BLD AUTO: 3.23 10*6/MM3 (ref 3.77–5.28)
RETICS # AUTO: 0.04 10*6/MM3 (ref 0.02–0.13)
RETICS/RBC NFR AUTO: 1.3 % (ref 0.7–1.9)
SODIUM SERPL-SCNC: 140 MMOL/L (ref 136–145)
TIBC SERPL-MCNC: 292 MCG/DL (ref 298–536)
TRANSFERRIN SERPL-MCNC: 196 MG/DL (ref 200–360)
WBC NRBC COR # BLD AUTO: 6.19 10*3/MM3 (ref 3.4–10.8)

## 2025-08-14 PROCEDURE — 85046 RETICYTE/HGB CONCENTRATE: CPT | Performed by: INTERNAL MEDICINE

## 2025-08-14 PROCEDURE — 85025 COMPLETE CBC W/AUTO DIFF WBC: CPT | Performed by: INTERNAL MEDICINE

## 2025-08-14 PROCEDURE — 36415 COLL VENOUS BLD VENIPUNCTURE: CPT

## 2025-08-14 PROCEDURE — 80053 COMPREHEN METABOLIC PANEL: CPT | Performed by: INTERNAL MEDICINE

## 2025-08-14 PROCEDURE — 83540 ASSAY OF IRON: CPT | Performed by: INTERNAL MEDICINE

## 2025-08-14 PROCEDURE — 82728 ASSAY OF FERRITIN: CPT | Performed by: INTERNAL MEDICINE

## 2025-08-14 PROCEDURE — 84466 ASSAY OF TRANSFERRIN: CPT | Performed by: INTERNAL MEDICINE

## 2025-08-15 RX ORDER — HYDROXYUREA 500 MG/1
CAPSULE ORAL
Qty: 108 CAPSULE | Refills: 1 | Status: SHIPPED | OUTPATIENT
Start: 2025-08-15

## 2025-08-18 ENCOUNTER — TELEPHONE (OUTPATIENT)
Dept: ONCOLOGY | Facility: CLINIC | Age: 70
End: 2025-08-18
Payer: COMMERCIAL

## 2025-08-21 ENCOUNTER — OFFICE VISIT (OUTPATIENT)
Dept: ONCOLOGY | Facility: CLINIC | Age: 70
End: 2025-08-21
Payer: MEDICARE

## 2025-08-21 ENCOUNTER — APPOINTMENT (OUTPATIENT)
Dept: OTHER | Facility: HOSPITAL | Age: 70
End: 2025-08-21
Payer: MEDICARE

## 2025-08-21 ENCOUNTER — LAB (OUTPATIENT)
Dept: OTHER | Facility: HOSPITAL | Age: 70
End: 2025-08-21
Payer: MEDICARE

## 2025-08-21 VITALS
HEART RATE: 63 BPM | SYSTOLIC BLOOD PRESSURE: 132 MMHG | DIASTOLIC BLOOD PRESSURE: 70 MMHG | BODY MASS INDEX: 24.14 KG/M2 | HEIGHT: 68 IN | TEMPERATURE: 97.2 F | OXYGEN SATURATION: 96 % | WEIGHT: 159.3 LBS

## 2025-08-21 DIAGNOSIS — D75.839 THROMBOCYTOSIS: ICD-10-CM

## 2025-08-21 DIAGNOSIS — D64.9 ANEMIA, UNSPECIFIED TYPE: Primary | ICD-10-CM

## 2025-08-21 LAB — VIT B12 BLD-MCNC: 1960 PG/ML (ref 211–946)

## 2025-08-21 PROCEDURE — 36415 COLL VENOUS BLD VENIPUNCTURE: CPT | Performed by: INTERNAL MEDICINE

## 2025-08-21 RX ORDER — CYCLOSPORINE 0.5 MG/ML
EMULSION OPHTHALMIC
COMMUNITY
Start: 2025-08-20

## 2025-08-25 ENCOUNTER — SPECIALTY PHARMACY (OUTPATIENT)
Dept: PHARMACY | Facility: HOSPITAL | Age: 70
End: 2025-08-25
Payer: COMMERCIAL

## 2025-08-25 LAB
FOLATE BLD-MCNC: 474 NG/ML
FOLATE RBC-MCNC: 1302 NG/ML
HCT VFR BLD AUTO: 36.4 % (ref 34–46.6)

## 2025-08-25 RX ORDER — HYDROXYUREA 500 MG/1
500 CAPSULE ORAL DAILY
Qty: 90 CAPSULE | Refills: 1 | Status: SHIPPED | OUTPATIENT
Start: 2025-08-25

## 2025-08-26 ENCOUNTER — OFFICE VISIT (OUTPATIENT)
Age: 70
End: 2025-08-26
Payer: MEDICARE

## 2025-08-26 VITALS
SYSTOLIC BLOOD PRESSURE: 119 MMHG | WEIGHT: 159.4 LBS | BODY MASS INDEX: 24.16 KG/M2 | HEIGHT: 68 IN | DIASTOLIC BLOOD PRESSURE: 72 MMHG

## 2025-08-26 DIAGNOSIS — Z86.718 HISTORY OF DVT (DEEP VEIN THROMBOSIS): ICD-10-CM

## 2025-08-26 DIAGNOSIS — Z15.89 JAK2 V617F MUTATION: ICD-10-CM

## 2025-08-26 DIAGNOSIS — D68.59 THROMBOPHILIA: Primary | ICD-10-CM

## 2025-08-26 LAB — COPPER SERPL-MCNC: 106 UG/DL (ref 80–158)

## 2025-08-26 PROCEDURE — 1160F RVW MEDS BY RX/DR IN RCRD: CPT | Performed by: NURSE PRACTITIONER

## 2025-08-26 PROCEDURE — 99213 OFFICE O/P EST LOW 20 MIN: CPT | Performed by: NURSE PRACTITIONER

## 2025-08-26 PROCEDURE — 1159F MED LIST DOCD IN RCRD: CPT | Performed by: NURSE PRACTITIONER

## 2025-08-27 ENCOUNTER — SPECIALTY PHARMACY (OUTPATIENT)
Dept: PHARMACY | Facility: HOSPITAL | Age: 70
End: 2025-08-27
Payer: COMMERCIAL

## 2025-08-27 LAB — ZINC SERPL-MCNC: 89 UG/DL (ref 44–115)

## (undated) DEVICE — BLANKT WARM UPPR/BDY ARM/OUT 57X196CM

## (undated) DEVICE — SUT VIC 3/0 TIES 18IN J110T

## (undated) DEVICE — GLV SURG BIOGEL LTX PF 6 1/2

## (undated) DEVICE — BNDG ELAS ELITE V/CLOSE 4IN 5YD LF STRL

## (undated) DEVICE — SUT VIC 3/0 CTI 36IN J944H

## (undated) DEVICE — SUT VIC 3/0 SH 27IN J416H

## (undated) DEVICE — SCRW COMPR KREULOCK VA LK FUL/THRD TI 3X14MM
Type: IMPLANTABLE DEVICE | Site: FOOT | Status: NON-FUNCTIONAL
Removed: 2022-10-25

## (undated) DEVICE — PK ORTHO MINOR TOWER 40

## (undated) DEVICE — IMPLANTABLE DEVICE
Type: IMPLANTABLE DEVICE | Site: FOOT | Status: NON-FUNCTIONAL
Removed: 2022-10-25

## (undated) DEVICE — APPL CHLORAPREP HI/LITE 26ML ORNG

## (undated) DEVICE — GLV SURG BIOGEL LTX PF 8

## (undated) DEVICE — PIN BB TAK MTP THRD

## (undated) DEVICE — SENSR O2 OXIMAX FNGR A/ 18IN NONSTR

## (undated) DEVICE — BG TRANSF W/COUPLER SPK 600ML

## (undated) DEVICE — TRAP FLD MINIVAC MEGADYNE 100ML

## (undated) DEVICE — ADAPT CLN BIOGUARD AIR/H2O DISP

## (undated) DEVICE — STPLR SKIN VISISTAT WD 35CT

## (undated) DEVICE — PRECISION THIN (9.0 X 0.38 X 25.0MM)

## (undated) DEVICE — BIT DRL FOR USE W LOCK SCRW3MM 2.2MM

## (undated) DEVICE — SYR LUERLOK 20CC BX/50

## (undated) DEVICE — GLV SURG SIGNATURE ESSENTIAL PF LTX SZ8

## (undated) DEVICE — LOU CYSTO: Brand: MEDLINE INDUSTRIES, INC.

## (undated) DEVICE — DRSNG GZ PETROLTM XEROFORM CURAD 1X8IN STRL

## (undated) DEVICE — TIDISHIELD UROLOGY DRAIN BAGS FROSTY VINYL STERILE FITS SIEMENS UROSKOP ACCESS 20 PER CASE: Brand: TIDISHIELD

## (undated) DEVICE — KT ORCA ORCAPOD DISP STRL

## (undated) DEVICE — SUT ETHIB 0/0 MO6 I8IN CX45D

## (undated) DEVICE — TUBING, SUCTION, 1/4" X 10', STRAIGHT: Brand: MEDLINE

## (undated) DEVICE — SUT VIC 0 TIES 18IN J912G

## (undated) DEVICE — SHOE P/OP/CAST O/T FML LG 8/10

## (undated) DEVICE — DRN PENRS 5/8X18IN LTX

## (undated) DEVICE — LN SMPL CO2 SHTRM SD STREAM W/M LUER

## (undated) DEVICE — GAUZE,SPONGE,FLUFF,6"X6.75",STRL,10/TRAY: Brand: MEDLINE

## (undated) DEVICE — BITEBLOCK OMNI BLOC

## (undated) DEVICE — Device

## (undated) DEVICE — GLV SURG BIOGEL LTX PF 7

## (undated) DEVICE — SPONGE,LAP,12"X12",XR,ST,5/PK,40PK/CS: Brand: MEDLINE

## (undated) DEVICE — DISPOSABLE TOURNIQUET CUFF SINGLE BLADDER, SINGLE PORT AND QUICK CONNECT CONNECTOR: Brand: COLOR CUFF

## (undated) DEVICE — MSK ENDO PORT O2 POM ELITE CURAPLEX A/

## (undated) DEVICE — ELECTRD NDL EZ CLN MOD 2.75IN

## (undated) DEVICE — SUT VIC 2/0 TIES 18IN J111T

## (undated) DEVICE — TBG PENCL TELESCP MEGADYNE SMOKE EVAC 10FT

## (undated) DEVICE — SKIN PREP TRAY W/CHG: Brand: MEDLINE INDUSTRIES, INC.

## (undated) DEVICE — SUT PROLN 0 CT2 MONO 30IN 8412H

## (undated) DEVICE — SUT VIC 4/0 RB1 27IN J214H

## (undated) DEVICE — ANTIBACTERIAL UNDYED BRAIDED (POLYGLACTIN 910), SYNTHETIC ABSORBABLE SUTURE: Brand: COATED VICRYL

## (undated) DEVICE — PAD,ABDOMINAL,8"X10",ST,LF: Brand: MEDLINE

## (undated) DEVICE — UNDERCAST PADDING: Brand: DEROYAL

## (undated) DEVICE — BIT DRL COMPRESSION/FT MICRO

## (undated) DEVICE — LOU MINOR PROCEDURE: Brand: MEDLINE INDUSTRIES, INC.

## (undated) DEVICE — DRP C/ARM 41X74IN

## (undated) DEVICE — DRSNG SURESITE WNDW 4X4.5

## (undated) DEVICE — ADHS SKIN PREMIERPRO EXOFIN TOPICAL HI/VISC .5ML

## (undated) DEVICE — NDL HYPO ECLPS SFTY 22G 1 1/2IN

## (undated) DEVICE — PENCL E/S ULTRAVAC TELESCP NOSE HOLSTR 10FT

## (undated) DEVICE — CANN O2 ETCO2 FITS ALL CONN CO2 SMPL A/ 7IN DISP LF

## (undated) DEVICE — CATH URETRL FLXITP POLLACK STD 5F 70CM

## (undated) DEVICE — SYS PERFUS SEP PLATLT W TIPS CUST

## (undated) DEVICE — SNAR POLYP CAPTIVATOR RND STFF 2.4 240CM 10MM 1P/U